# Patient Record
Sex: FEMALE | Race: WHITE | NOT HISPANIC OR LATINO | Employment: OTHER | RURAL
[De-identification: names, ages, dates, MRNs, and addresses within clinical notes are randomized per-mention and may not be internally consistent; named-entity substitution may affect disease eponyms.]

---

## 2021-12-10 ENCOUNTER — HOSPITAL ENCOUNTER (OUTPATIENT)
Dept: RADIOLOGY | Facility: HOSPITAL | Age: 81
Discharge: HOME | End: 2021-12-10
Attending: NURSE PRACTITIONER
Payer: MEDICARE

## 2021-12-10 DIAGNOSIS — R10.84 DIFFUSE ABDOMINAL PAIN: ICD-10-CM

## 2021-12-10 PROCEDURE — 25500020 PHARM REV CODE 255: Performed by: NURSE PRACTITIONER

## 2021-12-10 PROCEDURE — 74177 CT ABD & PELVIS W/CONTRAST: CPT | Mod: TC

## 2021-12-10 RX ADMIN — IOPAMIDOL 65 ML: 755 INJECTION, SOLUTION INTRAVENOUS at 11:12

## 2022-11-01 ENCOUNTER — LAB REQUISITION (OUTPATIENT)
Dept: LAB | Facility: HOSPITAL | Age: 82
End: 2022-11-01
Attending: NURSE PRACTITIONER
Payer: MEDICARE

## 2022-11-01 DIAGNOSIS — I25.10 ATHEROSCLEROTIC HEART DISEASE OF NATIVE CORONARY ARTERY WITHOUT ANGINA PECTORIS: ICD-10-CM

## 2022-11-01 DIAGNOSIS — I48.91 UNSPECIFIED ATRIAL FIBRILLATION: ICD-10-CM

## 2022-11-01 LAB
ANION GAP SERPL CALCULATED.3IONS-SCNC: 11 MMOL/L (ref 7–16)
BUN SERPL-MCNC: 21 MG/DL (ref 7–18)
BUN/CREAT SERPL: 27 (ref 6–20)
CALCIUM SERPL-MCNC: 9.6 MG/DL (ref 8.5–10.1)
CHLORIDE SERPL-SCNC: 101 MMOL/L (ref 98–107)
CO2 SERPL-SCNC: 34 MMOL/L (ref 21–32)
CREAT SERPL-MCNC: 0.78 MG/DL (ref 0.55–1.02)
EGFR (NO RACE VARIABLE) (RUSH/TITUS): 77 ML/MIN/1.73M²
GLUCOSE SERPL-MCNC: 175 MG/DL (ref 74–106)
NT-PROBNP SERPL-MCNC: 3051 PG/ML (ref 1–450)
POTASSIUM SERPL-SCNC: 5.5 MMOL/L (ref 3.5–5.1)
SODIUM SERPL-SCNC: 140 MMOL/L (ref 136–145)

## 2022-11-01 PROCEDURE — 83880 ASSAY OF NATRIURETIC PEPTIDE: CPT | Performed by: NURSE PRACTITIONER

## 2022-11-01 PROCEDURE — 80048 BASIC METABOLIC PNL TOTAL CA: CPT | Performed by: NURSE PRACTITIONER

## 2022-11-07 ENCOUNTER — LAB REQUISITION (OUTPATIENT)
Dept: LAB | Facility: HOSPITAL | Age: 82
End: 2022-11-07
Attending: NURSE PRACTITIONER
Payer: MEDICARE

## 2022-11-07 DIAGNOSIS — I10 ESSENTIAL (PRIMARY) HYPERTENSION: ICD-10-CM

## 2022-11-07 LAB — NT-PROBNP SERPL-MCNC: 3185 PG/ML (ref 1–450)

## 2022-11-07 PROCEDURE — 83880 ASSAY OF NATRIURETIC PEPTIDE: CPT | Performed by: NURSE PRACTITIONER

## 2022-12-28 ENCOUNTER — HOSPITAL ENCOUNTER (EMERGENCY)
Facility: HOSPITAL | Age: 82
Discharge: OTHER INSTITUTION | End: 2022-12-28
Payer: MEDICARE

## 2022-12-28 VITALS
HEIGHT: 68 IN | BODY MASS INDEX: 22.73 KG/M2 | TEMPERATURE: 98 F | DIASTOLIC BLOOD PRESSURE: 87 MMHG | SYSTOLIC BLOOD PRESSURE: 151 MMHG | OXYGEN SATURATION: 96 % | RESPIRATION RATE: 14 BRPM | WEIGHT: 150 LBS | HEART RATE: 75 BPM
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
## 2022-12-28 DIAGNOSIS — S72.141A CLOSED COMMINUTED INTERTROCHANTERIC FRACTURE OF RIGHT FEMUR, INITIAL ENCOUNTER: Primary | ICD-10-CM

## 2022-12-28 LAB
ALBUMIN SERPL BCP-MCNC: 3.9 G/DL (ref 3.5–5)
ALBUMIN/GLOB SERPL: 1.3 {RATIO}
ALP SERPL-CCNC: 73 U/L (ref 55–142)
ALT SERPL W P-5'-P-CCNC: 64 U/L (ref 13–56)
ANION GAP SERPL CALCULATED.3IONS-SCNC: 5 MMOL/L (ref 7–16)
AST SERPL W P-5'-P-CCNC: 38 U/L (ref 15–37)
BASOPHILS # BLD AUTO: 0.06 K/UL (ref 0–0.2)
BASOPHILS NFR BLD AUTO: 0.4 % (ref 0–1)
BILIRUB SERPL-MCNC: 0.4 MG/DL (ref ?–1.2)
BUN SERPL-MCNC: 15 MG/DL (ref 7–18)
BUN/CREAT SERPL: 13 (ref 6–20)
CALCIUM SERPL-MCNC: 9.3 MG/DL (ref 8.5–10.1)
CHLORIDE SERPL-SCNC: 93 MMOL/L (ref 98–107)
CO2 SERPL-SCNC: 34 MMOL/L (ref 21–32)
CREAT SERPL-MCNC: 1.14 MG/DL (ref 0.55–1.02)
DIFFERENTIAL METHOD BLD: ABNORMAL
EGFR (NO RACE VARIABLE) (RUSH/TITUS): 49 ML/MIN/1.73M²
EOSINOPHIL # BLD AUTO: 0.03 K/UL (ref 0–0.5)
EOSINOPHIL NFR BLD AUTO: 0.2 % (ref 1–4)
ERYTHROCYTE [DISTWIDTH] IN BLOOD BY AUTOMATED COUNT: 16.1 % (ref 11.5–14.5)
GLOBULIN SER-MCNC: 3.1 G/DL (ref 2–4)
GLUCOSE SERPL-MCNC: 328 MG/DL (ref 74–106)
HCT VFR BLD AUTO: 45.2 % (ref 38–47)
HGB BLD-MCNC: 13.5 G/DL (ref 12–16)
INR BLD: 1.56
LYMPHOCYTES # BLD AUTO: 2.22 K/UL (ref 1–4.8)
LYMPHOCYTES NFR BLD AUTO: 15.3 % (ref 27–41)
MCH RBC QN AUTO: 28.7 PG (ref 27–31)
MCHC RBC AUTO-ENTMCNC: 29.9 G/DL (ref 32–36)
MCV RBC AUTO: 96.2 FL (ref 80–96)
MONOCYTES # BLD AUTO: 0.82 K/UL (ref 0–0.8)
MONOCYTES NFR BLD AUTO: 5.6 % (ref 2–6)
MPC BLD CALC-MCNC: 11.6 FL (ref 9.4–12.4)
NEUTROPHILS # BLD AUTO: 11.39 K/UL (ref 1.8–7.7)
NEUTROPHILS NFR BLD AUTO: 78.5 % (ref 53–65)
PLATELET # BLD AUTO: 133 K/UL (ref 150–400)
POTASSIUM SERPL-SCNC: 3.7 MMOL/L (ref 3.5–5.1)
PROT SERPL-MCNC: 7 G/DL (ref 6.4–8.2)
PROTHROMBIN TIME: 18.7 SECONDS (ref 11.7–14.7)
RBC # BLD AUTO: 4.7 M/UL (ref 4.2–5.4)
SODIUM SERPL-SCNC: 128 MMOL/L (ref 136–145)
WBC # BLD AUTO: 14.52 K/UL (ref 4.5–11)

## 2022-12-28 PROCEDURE — 85025 COMPLETE CBC W/AUTO DIFF WBC: CPT | Performed by: NURSE PRACTITIONER

## 2022-12-28 PROCEDURE — 96361 HYDRATE IV INFUSION ADD-ON: CPT

## 2022-12-28 PROCEDURE — 63600175 PHARM REV CODE 636 W HCPCS: Performed by: NURSE PRACTITIONER

## 2022-12-28 PROCEDURE — 96374 THER/PROPH/DIAG INJ IV PUSH: CPT

## 2022-12-28 PROCEDURE — 85610 PROTHROMBIN TIME: CPT | Performed by: NURSE PRACTITIONER

## 2022-12-28 PROCEDURE — 96375 TX/PRO/DX INJ NEW DRUG ADDON: CPT

## 2022-12-28 PROCEDURE — 96376 TX/PRO/DX INJ SAME DRUG ADON: CPT

## 2022-12-28 PROCEDURE — 80053 COMPREHEN METABOLIC PANEL: CPT | Performed by: NURSE PRACTITIONER

## 2022-12-28 PROCEDURE — 99283 EMERGENCY DEPT VISIT LOW MDM: CPT | Mod: GF | Performed by: NURSE PRACTITIONER

## 2022-12-28 PROCEDURE — 99285 EMERGENCY DEPT VISIT HI MDM: CPT | Mod: 25

## 2022-12-28 PROCEDURE — 25000003 PHARM REV CODE 250: Performed by: NURSE PRACTITIONER

## 2022-12-28 RX ORDER — SODIUM CHLORIDE 9 MG/ML
1000 INJECTION, SOLUTION INTRAVENOUS
Status: COMPLETED | OUTPATIENT
Start: 2022-12-28 | End: 2022-12-28

## 2022-12-28 RX ORDER — ATORVASTATIN CALCIUM 40 MG/1
40 TABLET, FILM COATED ORAL
COMMUNITY
Start: 2022-09-06

## 2022-12-28 RX ORDER — HYDROMORPHONE HYDROCHLORIDE 2 MG/ML
0.5 INJECTION, SOLUTION INTRAMUSCULAR; INTRAVENOUS; SUBCUTANEOUS
Status: COMPLETED | OUTPATIENT
Start: 2022-12-28 | End: 2022-12-28

## 2022-12-28 RX ORDER — FUROSEMIDE 40 MG/1
40 TABLET ORAL DAILY
COMMUNITY
Start: 2022-11-21

## 2022-12-28 RX ORDER — LEVOTHYROXINE SODIUM 25 UG/1
25 TABLET ORAL EVERY MORNING
Status: ON HOLD | COMMUNITY
Start: 2022-12-12 | End: 2023-01-17 | Stop reason: HOSPADM

## 2022-12-28 RX ORDER — METOPROLOL SUCCINATE 25 MG/1
0.5 TABLET, EXTENDED RELEASE ORAL
Status: ON HOLD | COMMUNITY
Start: 2022-10-04 | End: 2023-01-04

## 2022-12-28 RX ORDER — METFORMIN HYDROCHLORIDE 1000 MG/1
1000 TABLET ORAL 2 TIMES DAILY WITH MEALS
COMMUNITY
Start: 2022-12-15

## 2022-12-28 RX ORDER — WARFARIN 4 MG/1
4 TABLET ORAL DAILY
COMMUNITY
Start: 2022-11-23

## 2022-12-28 RX ORDER — MORPHINE SULFATE 4 MG/ML
2 INJECTION, SOLUTION INTRAMUSCULAR; INTRAVENOUS
Status: COMPLETED | OUTPATIENT
Start: 2022-12-28 | End: 2022-12-28

## 2022-12-28 RX ORDER — PREDNISONE 5 MG/1
5 TABLET ORAL
COMMUNITY
Start: 2022-11-30

## 2022-12-28 RX ORDER — HYDROMORPHONE HYDROCHLORIDE 2 MG/ML
1 INJECTION, SOLUTION INTRAMUSCULAR; INTRAVENOUS; SUBCUTANEOUS
Status: COMPLETED | OUTPATIENT
Start: 2022-12-28 | End: 2022-12-28

## 2022-12-28 RX ORDER — OXYCODONE HYDROCHLORIDE 10 MG/1
10 TABLET ORAL EVERY 4 HOURS PRN
Status: ON HOLD | COMMUNITY
Start: 2022-12-03 | End: 2023-01-17 | Stop reason: HOSPADM

## 2022-12-28 RX ORDER — WARFARIN SODIUM 5 MG/1
TABLET ORAL
COMMUNITY
Start: 2022-11-21 | End: 2023-06-13 | Stop reason: CLARIF

## 2022-12-28 RX ORDER — ISOSORBIDE MONONITRATE 30 MG/1
30 TABLET, EXTENDED RELEASE ORAL EVERY MORNING
Status: ON HOLD | COMMUNITY
Start: 2022-09-13 | End: 2023-01-04

## 2022-12-28 RX ADMIN — HYDROMORPHONE HYDROCHLORIDE 1 MG: 2 INJECTION, SOLUTION INTRAMUSCULAR; INTRAVENOUS; SUBCUTANEOUS at 03:12

## 2022-12-28 RX ADMIN — SODIUM CHLORIDE 1000 ML: 9 INJECTION, SOLUTION INTRAVENOUS at 01:12

## 2022-12-28 RX ADMIN — HYDROMORPHONE HYDROCHLORIDE 0.5 MG: 2 INJECTION, SOLUTION INTRAMUSCULAR; INTRAVENOUS; SUBCUTANEOUS at 01:12

## 2022-12-28 RX ADMIN — MORPHINE SULFATE 2 MG: 4 INJECTION, SOLUTION INTRAMUSCULAR; INTRAVENOUS at 12:12

## 2022-12-28 RX ADMIN — HYDROMORPHONE HYDROCHLORIDE 0.5 MG: 2 INJECTION, SOLUTION INTRAMUSCULAR; INTRAVENOUS; SUBCUTANEOUS at 02:12

## 2022-12-28 NOTE — ED TRIAGE NOTES
Presents to ed with son per pov s/p fall  c/o right hip pain.skin tear noted to right inner forearm,bleeding controlled.took pain pill pta in ed.

## 2022-12-28 NOTE — ED PROVIDER NOTES
Encounter Date: 12/28/2022       History     Chief Complaint   Patient presents with    Fall    Hip Pain     right     82 yr old WF to ED with c/o fell this morning in the laundry room, states did strike head but no LOC.  C/O right hip pain.  Noted skin tear to right forearm.      The history is provided by the patient and a relative.   Fall  The accident occurred just prior to arrival. The fall occurred while walking. She landed on A hard floor. The point of impact was the head and right hip. Pertinent negatives include no neck pain, no back pain, no paresthesias, no paralysis, no numbness, no abdominal pain and no headaches. The symptoms are aggravated by ambulation and use of the injured limb. She has tried nothing for the symptoms.   Review of patient's allergies indicates:   Allergen Reactions    Tylenol [acetaminophen] Anaphylaxis     History reviewed. No pertinent past medical history.  Past Surgical History:   Procedure Laterality Date    APPENDECTOMY      BACK SURGERY  1980 1984    BILATERAL TUBAL LIGATION  1994    CARDIAC SURGERY  1990    aorta bypass    caritod Left     HERNIA REPAIR      intestine repair       History reviewed. No pertinent family history.     Review of Systems   Constitutional: Negative.    Respiratory: Negative.     Cardiovascular: Negative.    Gastrointestinal:  Negative for abdominal pain.   Musculoskeletal:  Negative for back pain and neck pain.   Skin: Negative.    Neurological:  Negative for numbness, headaches and paresthesias.   Psychiatric/Behavioral: Negative.       Physical Exam     Initial Vitals [12/28/22 1102]   BP Pulse Resp Temp SpO2   (!) 120/56 67 16 97.7 °F (36.5 °C) 96 %      MAP       --         Physical Exam    Nursing note and vitals reviewed.  Constitutional: She appears well-developed and well-nourished.   Neck: Neck supple.   Cardiovascular:  Normal rate and regular rhythm.           Pulmonary/Chest: Breath sounds normal.   Abdominal: Abdomen is soft. There  is no abdominal tenderness.   Musculoskeletal:         General: Tenderness present. Normal range of motion.      Cervical back: Neck supple.      Comments: Right hip     Skin: Skin is warm and dry.   Skin tear to right forearm   Psychiatric: She has a normal mood and affect.       Medical Screening Exam   See Full Note    ED Course   Procedures  Labs Reviewed   COMPREHENSIVE METABOLIC PANEL - Abnormal; Notable for the following components:       Result Value    Sodium 128 (*)     Chloride 93 (*)     CO2 34 (*)     Anion Gap 5 (*)     Glucose 328 (*)     Creatinine 1.14 (*)     ALT 64 (*)     AST 38 (*)     eGFR 49 (*)     All other components within normal limits   PROTIME-INR - Abnormal; Notable for the following components:    PT 18.7 (*)     All other components within normal limits   CBC WITH DIFFERENTIAL - Abnormal; Notable for the following components:    WBC 14.52 (*)     MCV 96.2 (*)     MCHC 29.9 (*)     RDW 16.1 (*)     Platelet Count 133 (*)     Neutrophils % 78.5 (*)     Lymphocytes % 15.3 (*)     Neutrophils, Abs 11.39 (*)     Eosinophils % 0.2 (*)     Monocytes, Absolute 0.82 (*)     All other components within normal limits   CBC W/ AUTO DIFFERENTIAL    Narrative:     The following orders were created for panel order CBC Auto Differential.  Procedure                               Abnormality         Status                     ---------                               -----------         ------                     CBC with Differential[[id number]]        Abnormal            Final result               Manual Differential[[id number]]                              Final result                 Please view results for these tests on the individual orders.   MANUAL DIFFERENTIAL          Imaging Results              CT Cervical Spine Without Contrast (Final result)  Result time 12/28/22 11:51:25      Final result by Ehsan Uribe MD (12/28/22 11:51:25)                   Impression:      No acute fracture or  dislocation of the cervical spine.      Electronically signed by: Ehsan Uribe  Date:    12/28/2022  Time:    11:51               Narrative:    EXAMINATION:  CT CERVICAL SPINE WITHOUT CONTRAST    CLINICAL HISTORY:  Neck trauma (Age >= 65y);    TECHNIQUE:  CT of the cervical spine performed without intravenous contrast.  The CT examination was performed using one or more of the following dose reduction techniques: Automated exposure control, adjustment of the mA and kV according to patient's size, use of acute or iterative reconstruction techniques.    COMPARISON:  None    FINDINGS:  No acute fracture identified.  No dislocation.  The craniocervical junction is intact.  Degenerative endplate and facet changes are seen throughout the cervical spine with spinal canal and foraminal narrowing from C2-3 through C6-7.  Vascular calcifications in the neck.                                       CT Head Without Contrast (Final result)  Result time 12/28/22 11:50:22      Final result by Shukri Salas DO (12/28/22 11:50:22)                   Impression:      No acute intracranial findings.      Electronically signed by: Shukri Salas  Date:    12/28/2022  Time:    11:50               Narrative:    EXAMINATION:  CT HEAD WITHOUT CONTRAST    CLINICAL HISTORY:  Head trauma, minor (Age >= 65y);    TECHNIQUE:  Multiplanar CT imaging from the vertex to skull the skull base was performed without contrast.    COMPARISON:  None    FINDINGS:  Gray-white white differentiation is normal.    There is no CT evidence of acute intracranial hemorrhage or large territorial infarct. No epidural/subdural hematomas.    There is no evidence of hydrocephalus, midline shift or mass effect.    Scalp, paranasal sinuses, and mastoid air cells are normal.                                       X-Ray Hip 2 or 3 views Right (with Pelvis when performed) (Final result)  Result time 12/28/22 11:54:18      Final result by Ehsan Uribe MD (12/28/22  11:54:18)                   Impression:      As above      Electronically signed by: Ehsan Uribe  Date:    12/28/2022  Time:    11:54               Narrative:    EXAMINATION:  XR HIP WITH PELVIS WHEN PERFORMED, 2 OR 3  VIEWS RIGHT    CLINICAL HISTORY:  fall;    TECHNIQUE:  AP view of the pelvis and frog leg lateral view of the right hip were performed.    COMPARISON:  None    FINDINGS:  There is an acute comminuted and minimally displaced right intertrochanteric fracture.  No other fracture identified.                                       Medications   HYDROmorphone (PF) injection 1 mg (has no administration in time range)   morphine injection 2 mg (2 mg Intravenous Given 12/28/22 1224)   morphine injection 2 mg (2 mg Intravenous Given 12/28/22 1240)   HYDROmorphone (PF) injection 0.5 mg (0.5 mg Intravenous Given 12/28/22 1318)   HYDROmorphone (PF) injection 0.5 mg (0.5 mg Intravenous Given 12/28/22 1335)   0.9%  NaCl infusion (1,000 mLs Intravenous New Bag 12/28/22 1343)   HYDROmorphone (PF) injection 0.5 mg (0.5 mg Intravenous Given 12/28/22 1421)     Medical Decision Making:   Initial Assessment:   82 yr old WF to ED with c/o right hip pain s/p fall at home PTA.  Pt reports struck head but no LOC>  Pt takes warfarin  Differential Diagnosis:   HIP fracture   Hip contusion  Intracranial bleed    Clinical Tests:   Lab Tests: Ordered  Radiological Study: Ordered  ED Management:  Noted to have right intertrochanteric fx  Pt being transferred to [facility] ED to DR Guerrero           ED Course as of 12/28/22 1506   Wed Dec 28, 2022   1209 Discussed findings with patient and son, who request [facility] [CG]   1310 Still reports pain, will give dilaudid [CG]   1329 [facility] transfer center called and states Dr. Guerrero accepts to [facility] [CG]   1506 Pt reports c/o pain, will repeat Dilaudid.  EMS here for transport [CG]      ED Course User Index  [CG] PREETHI Shea          Clinical Impression:   Final  diagnoses:  [S72.141A] Closed comminuted intertrochanteric fracture of right femur, initial encounter (Primary)  [W19.XXXA] Fall, initial encounter        ED Disposition Condition    Transfer to Another Facility Stable                Frannie Antunez, PREETHI  12/28/22 1335       Frannie Antunez, PREETHI  12/28/22 1417       Frannie Antunez, PREETHI  12/28/22 1418       Frannie Antunez, PADDY  12/28/22 1506

## 2022-12-28 NOTE — ED NOTES
12 cm skin tear noted to right inner arm. Skin tear cleaned with ns then steri strips applied .multiple abrasions noted .

## 2023-01-04 ENCOUNTER — HOSPITAL ENCOUNTER (INPATIENT)
Facility: HOSPITAL | Age: 83
LOS: 13 days | Discharge: HOME | DRG: 560 | End: 2023-01-17
Attending: HOSPITALIST | Admitting: FAMILY MEDICINE
Payer: MEDICARE

## 2023-01-04 DIAGNOSIS — Z96.641 S/P TOTAL RIGHT HIP ARTHROPLASTY: ICD-10-CM

## 2023-01-04 PROBLEM — I10 HTN (HYPERTENSION): Chronic | Status: ACTIVE | Noted: 2023-01-04

## 2023-01-04 PROBLEM — J44.1 COPD EXACERBATION: Chronic | Status: ACTIVE | Noted: 2023-01-04

## 2023-01-04 PROBLEM — Z78.9 PRESENCE OF SURGICAL INCISION: Status: ACTIVE | Noted: 2023-01-04

## 2023-01-04 PROBLEM — E11.9 DIABETES: Chronic | Status: ACTIVE | Noted: 2023-01-04

## 2023-01-04 PROBLEM — I48.91 ATRIAL FIBRILLATION: Chronic | Status: ACTIVE | Noted: 2023-01-04

## 2023-01-04 LAB
ANION GAP SERPL CALCULATED.3IONS-SCNC: 7 MMOL/L (ref 7–16)
BACTERIA #/AREA URNS HPF: ABNORMAL /HPF
BASOPHILS # BLD AUTO: 0.03 K/UL (ref 0–0.2)
BASOPHILS NFR BLD AUTO: 0.4 % (ref 0–1)
BILIRUB UR QL STRIP: NEGATIVE
BUN SERPL-MCNC: 19 MG/DL (ref 7–18)
BUN/CREAT SERPL: 18 (ref 6–20)
CALCIUM SERPL-MCNC: 9 MG/DL (ref 8.5–10.1)
CHLORIDE SERPL-SCNC: 96 MMOL/L (ref 98–107)
CLARITY UR: ABNORMAL
CO2 SERPL-SCNC: 39 MMOL/L (ref 21–32)
COLOR UR: YELLOW
CREAT SERPL-MCNC: 1.08 MG/DL (ref 0.55–1.02)
DIFFERENTIAL METHOD BLD: ABNORMAL
EGFR (NO RACE VARIABLE) (RUSH/TITUS): 52 ML/MIN/1.73M²
EOSINOPHIL # BLD AUTO: 0.08 K/UL (ref 0–0.5)
EOSINOPHIL NFR BLD AUTO: 1 % (ref 1–4)
ERYTHROCYTE [DISTWIDTH] IN BLOOD BY AUTOMATED COUNT: 16.5 % (ref 11.5–14.5)
GLUCOSE SERPL-MCNC: 158 MG/DL (ref 70–105)
GLUCOSE SERPL-MCNC: 235 MG/DL (ref 70–105)
GLUCOSE SERPL-MCNC: 252 MG/DL (ref 74–106)
GLUCOSE UR STRIP-MCNC: NEGATIVE MG/DL
HCT VFR BLD AUTO: 34.5 % (ref 38–47)
HGB BLD-MCNC: 10.5 G/DL (ref 12–16)
INR BLD: 1.45
KETONES UR STRIP-SCNC: NEGATIVE MG/DL
LEUKOCYTE ESTERASE UR QL STRIP: ABNORMAL
LYMPHOCYTES # BLD AUTO: 0.67 K/UL (ref 1–4.8)
LYMPHOCYTES NFR BLD AUTO: 8 % (ref 27–41)
MCH RBC QN AUTO: 28.6 PG (ref 27–31)
MCHC RBC AUTO-ENTMCNC: 30.4 G/DL (ref 32–36)
MCV RBC AUTO: 94 FL (ref 80–96)
MONOCYTES # BLD AUTO: 0.66 K/UL (ref 0–0.8)
MONOCYTES NFR BLD AUTO: 7.9 % (ref 2–6)
MPC BLD CALC-MCNC: 10.3 FL (ref 9.4–12.4)
NEUTROPHILS # BLD AUTO: 6.92 K/UL (ref 1.8–7.7)
NEUTROPHILS NFR BLD AUTO: 82.7 % (ref 53–65)
NITRITE UR QL STRIP: NEGATIVE
PH UR STRIP: 6.5 PH UNITS
PLATELET # BLD AUTO: 271 K/UL (ref 150–400)
POTASSIUM SERPL-SCNC: 4.2 MMOL/L (ref 3.5–5.1)
PROT UR QL STRIP: NEGATIVE
PROTHROMBIN TIME: 17.7 SECONDS (ref 11.7–14.7)
RBC # BLD AUTO: 3.67 M/UL (ref 4.2–5.4)
RBC # UR STRIP: ABNORMAL /UL
RBC #/AREA URNS HPF: ABNORMAL /HPF
SODIUM SERPL-SCNC: 138 MMOL/L (ref 136–145)
SP GR UR STRIP: 1.01
SQUAMOUS #/AREA URNS LPF: ABNORMAL /LPF
UROBILINOGEN UR STRIP-ACNC: 0.2 MG/DL
WBC # BLD AUTO: 8.36 K/UL (ref 4.5–11)
WBC #/AREA URNS HPF: ABNORMAL /HPF

## 2023-01-04 PROCEDURE — 94761 N-INVAS EAR/PLS OXIMETRY MLT: CPT

## 2023-01-04 PROCEDURE — 81001 URINALYSIS AUTO W/SCOPE: CPT | Performed by: NURSE PRACTITIONER

## 2023-01-04 PROCEDURE — 36415 COLL VENOUS BLD VENIPUNCTURE: CPT | Performed by: NURSE PRACTITIONER

## 2023-01-04 PROCEDURE — 97163 PT EVAL HIGH COMPLEX 45 MIN: CPT

## 2023-01-04 PROCEDURE — 87186 SC STD MICRODIL/AGAR DIL: CPT | Performed by: NURSE PRACTITIONER

## 2023-01-04 PROCEDURE — 85610 PROTHROMBIN TIME: CPT | Performed by: NURSE PRACTITIONER

## 2023-01-04 PROCEDURE — 80048 BASIC METABOLIC PNL TOTAL CA: CPT | Performed by: NURSE PRACTITIONER

## 2023-01-04 PROCEDURE — 63600175 PHARM REV CODE 636 W HCPCS: Performed by: NURSE PRACTITIONER

## 2023-01-04 PROCEDURE — 85025 COMPLETE CBC W/AUTO DIFF WBC: CPT | Performed by: NURSE PRACTITIONER

## 2023-01-04 PROCEDURE — 27000221 HC OXYGEN, UP TO 24 HOURS

## 2023-01-04 PROCEDURE — 87077 CULTURE AEROBIC IDENTIFY: CPT | Performed by: NURSE PRACTITIONER

## 2023-01-04 PROCEDURE — 99900031 HC PATIENT EDUCATION (STAT)

## 2023-01-04 PROCEDURE — 82962 GLUCOSE BLOOD TEST: CPT

## 2023-01-04 PROCEDURE — 99900035 HC TECH TIME PER 15 MIN (STAT)

## 2023-01-04 PROCEDURE — 11000004 HC SNF PRIVATE

## 2023-01-04 PROCEDURE — 25000003 PHARM REV CODE 250: Performed by: NURSE PRACTITIONER

## 2023-01-04 PROCEDURE — 99406 BEHAV CHNG SMOKING 3-10 MIN: CPT

## 2023-01-04 PROCEDURE — 27000958

## 2023-01-04 RX ORDER — ENOXAPARIN SODIUM 100 MG/ML
40 INJECTION SUBCUTANEOUS DAILY
Status: DISCONTINUED | OUTPATIENT
Start: 2023-01-05 | End: 2023-01-09

## 2023-01-04 RX ORDER — DIGOXIN 125 MCG
125 TABLET ORAL DAILY
COMMUNITY

## 2023-01-04 RX ORDER — AMIODARONE HYDROCHLORIDE 200 MG/1
200 TABLET ORAL 2 TIMES DAILY
Status: DISCONTINUED | OUTPATIENT
Start: 2023-01-04 | End: 2023-01-17 | Stop reason: HOSPADM

## 2023-01-04 RX ORDER — POLYETHYLENE GLYCOL 3350 17 G/17G
17 POWDER, FOR SOLUTION ORAL DAILY
Status: ON HOLD | COMMUNITY
Start: 2023-01-04 | End: 2023-01-17 | Stop reason: HOSPADM

## 2023-01-04 RX ORDER — GLUCAGON 1 MG
1 KIT INJECTION
Status: DISCONTINUED | OUTPATIENT
Start: 2023-01-04 | End: 2023-01-17 | Stop reason: HOSPADM

## 2023-01-04 RX ORDER — MAG HYDROX/ALUMINUM HYD/SIMETH 200-200-20
30 SUSPENSION, ORAL (FINAL DOSE FORM) ORAL EVERY 6 HOURS PRN
Status: DISCONTINUED | OUTPATIENT
Start: 2023-01-04 | End: 2023-01-17 | Stop reason: HOSPADM

## 2023-01-04 RX ORDER — ADHESIVE BANDAGE
30 BANDAGE TOPICAL DAILY PRN
Status: DISCONTINUED | OUTPATIENT
Start: 2023-01-04 | End: 2023-01-17 | Stop reason: HOSPADM

## 2023-01-04 RX ORDER — INSULIN ASPART 100 [IU]/ML
0-5 INJECTION, SOLUTION INTRAVENOUS; SUBCUTANEOUS
Status: DISCONTINUED | OUTPATIENT
Start: 2023-01-04 | End: 2023-01-17 | Stop reason: HOSPADM

## 2023-01-04 RX ORDER — TALC
6 POWDER (GRAM) TOPICAL NIGHTLY PRN
Status: DISCONTINUED | OUTPATIENT
Start: 2023-01-04 | End: 2023-01-17 | Stop reason: HOSPADM

## 2023-01-04 RX ORDER — IBUPROFEN 200 MG
16 TABLET ORAL
Status: DISCONTINUED | OUTPATIENT
Start: 2023-01-04 | End: 2023-01-17 | Stop reason: HOSPADM

## 2023-01-04 RX ORDER — CEFDINIR 300 MG/1
300 CAPSULE ORAL EVERY 12 HOURS
Status: DISCONTINUED | OUTPATIENT
Start: 2023-01-04 | End: 2023-01-08

## 2023-01-04 RX ORDER — POLYETHYLENE GLYCOL 3350 17 G/17G
17 POWDER, FOR SOLUTION ORAL DAILY
Status: COMPLETED | OUTPATIENT
Start: 2023-01-05 | End: 2023-01-07

## 2023-01-04 RX ORDER — HYDROMORPHONE HYDROCHLORIDE 2 MG/1
2 TABLET ORAL EVERY 4 HOURS PRN
Status: DISCONTINUED | OUTPATIENT
Start: 2023-01-04 | End: 2023-01-05

## 2023-01-04 RX ORDER — PREDNISONE 5 MG/1
5 TABLET ORAL DAILY
Status: DISCONTINUED | OUTPATIENT
Start: 2023-01-05 | End: 2023-01-17 | Stop reason: HOSPADM

## 2023-01-04 RX ORDER — FUROSEMIDE 40 MG/1
40 TABLET ORAL DAILY
Status: DISCONTINUED | OUTPATIENT
Start: 2023-01-05 | End: 2023-01-17 | Stop reason: HOSPADM

## 2023-01-04 RX ORDER — LEVOTHYROXINE SODIUM 25 UG/1
25 TABLET ORAL
Status: DISCONTINUED | OUTPATIENT
Start: 2023-01-05 | End: 2023-01-17

## 2023-01-04 RX ORDER — ATORVASTATIN CALCIUM 40 MG/1
40 TABLET, FILM COATED ORAL NIGHTLY
Status: DISCONTINUED | OUTPATIENT
Start: 2023-01-04 | End: 2023-01-17 | Stop reason: HOSPADM

## 2023-01-04 RX ORDER — DIGOXIN 125 MCG
0.12 TABLET ORAL DAILY
Status: DISCONTINUED | OUTPATIENT
Start: 2023-01-05 | End: 2023-01-17 | Stop reason: HOSPADM

## 2023-01-04 RX ORDER — AMIODARONE HYDROCHLORIDE 200 MG/1
200 TABLET ORAL DAILY
COMMUNITY

## 2023-01-04 RX ORDER — WARFARIN 1 MG/1
4 TABLET ORAL
Status: DISCONTINUED | OUTPATIENT
Start: 2023-01-04 | End: 2023-01-06

## 2023-01-04 RX ORDER — SENNOSIDES 8.6 MG/1
8.6 TABLET ORAL DAILY PRN
Status: DISCONTINUED | OUTPATIENT
Start: 2023-01-04 | End: 2023-01-17 | Stop reason: HOSPADM

## 2023-01-04 RX ORDER — ONDANSETRON 4 MG/1
4 TABLET, ORALLY DISINTEGRATING ORAL EVERY 8 HOURS PRN
Status: DISCONTINUED | OUTPATIENT
Start: 2023-01-04 | End: 2023-01-17 | Stop reason: HOSPADM

## 2023-01-04 RX ORDER — METFORMIN HYDROCHLORIDE 500 MG/1
1000 TABLET ORAL
Status: DISCONTINUED | OUTPATIENT
Start: 2023-01-05 | End: 2023-01-17 | Stop reason: HOSPADM

## 2023-01-04 RX ORDER — IBUPROFEN 200 MG
24 TABLET ORAL
Status: DISCONTINUED | OUTPATIENT
Start: 2023-01-04 | End: 2023-01-17 | Stop reason: HOSPADM

## 2023-01-04 RX ADMIN — AMIODARONE HYDROCHLORIDE 200 MG: 200 TABLET ORAL at 08:01

## 2023-01-04 RX ADMIN — CEFDINIR 300 MG: 300 CAPSULE ORAL at 08:01

## 2023-01-04 RX ADMIN — Medication 6 MG: at 08:01

## 2023-01-04 RX ADMIN — HYDROMORPHONE HYDROCHLORIDE 2 MG: 2 TABLET ORAL at 08:01

## 2023-01-04 RX ADMIN — ATORVASTATIN CALCIUM 40 MG: 40 TABLET, FILM COATED ORAL at 08:01

## 2023-01-04 RX ADMIN — HYDROMORPHONE HYDROCHLORIDE 2 MG: 2 TABLET ORAL at 04:01

## 2023-01-04 RX ADMIN — WARFARIN SODIUM 4 MG: 1 TABLET ORAL at 05:01

## 2023-01-04 RX ADMIN — INSULIN ASPART 2 UNITS: 100 INJECTION, SOLUTION INTRAVENOUS; SUBCUTANEOUS at 05:01

## 2023-01-04 RX ADMIN — SENNOSIDES 8.6 MG: 8.6 TABLET, FILM COATED ORAL at 08:01

## 2023-01-04 NOTE — RESPIRATORY THERAPY
Called to pt room on admission to room for oxygen setup by nursing services. EMS transferred pt to room and pt had cannula on but not connect to oxygen. On my arrival pt o2 sat 61% room air. O2 initiated immediately with improved o2 sat 93%.  Pt not complaining of shortness of breath. Awake alert and oriented. Family at bedside.

## 2023-01-04 NOTE — ASSESSMENT & PLAN NOTE
PT eval and treat  OT eval and treat  ST eval and treat  Resp consult  Incision site care as directed by ortho  Staple removal as directed by ortho  Ortho follow up in 3 weeks     Rotation Flap Text: The defect edges were debeveled with a #15 scalpel blade.  Given the location of the defect, shape of the defect and the proximity to free margins a rotation flap was deemed most appropriate.  Using a sterile surgical marker, an appropriate rotation flap was drawn incorporating the defect and placing the expected incisions within the relaxed skin tension lines where possible.    The area thus outlined was incised deep to adipose tissue with a #15 scalpel blade.  The skin margins were undermined to an appropriate distance in all directions utilizing iris scissors.

## 2023-01-04 NOTE — PLAN OF CARE
01/04/23 1317   Discharge Assessment   Assessment Type Discharge Planning Assessment   Confirmed/corrected address, phone number and insurance Yes   Confirmed Demographics Correct on Facesheet   Source of Information patient;family   Communicated CHANTAL with patient/caregiver Date not available/Unable to determine   Reason For Admission inpatient PT/OT   People in Home child(ruddy), adult   Facility Arrived From: [facility]   Do you expect to return to your current living situation? Yes   Do you have help at home or someone to help you manage your care at home? Yes   Prior to hospitilization cognitive status: Alert/Oriented   Current cognitive status: Alert/Oriented   Walking or Climbing Stairs ambulation difficulty, requires equipment   Home Accessibility wheelchair accessible   Home Layout Able to live on 1st floor   Equipment Currently Used at Home bedside commode;nebulizer;oxygen;raised toilet;respiratory supplies;walker, rolling   Readmission within 30 days? No   Patient currently being followed by outpatient case management? No   Do you currently have service(s) that help you manage your care at home? Yes   Name and Contact number of agency [facility]   Is the pt/caregiver preference to resume services with current agency Yes   Do you take prescription medications? Yes   Do you have prescription coverage? Yes   Do you have any problems affording any of your prescribed medications? No   Is the patient taking medications as prescribed? yes   Who is going to help you get home at discharge? Son, Vinay daughter, Lisa   How do you get to doctors appointments? car, drives self;family or friend will provide   Are you on dialysis? No   Do you take coumadin? Yes   Who monitors your labs? home health   Discharge Plan A Home with family;Home Health   DME Needed Upon Discharge  other (see comments)  (tbd)   Discharge Plan discussed with: Patient   Discharge Barriers Identified None   Physical Activity   On average, how many  days per week do you engage in moderate to strenuous exercise (like a brisk walk)? 0 days   On average, how many minutes do you engage in exercise at this level? 0 min   Financial Resource Strain   How hard is it for you to pay for the very basics like food, housing, medical care, and heating? Not hard   Housing Stability   In the last 12 months, was there a time when you were not able to pay the mortgage or rent on time? N   In the last 12 months, how many places have you lived? 1   In the last 12 months, was there a time when you did not have a steady place to sleep or slept in a shelter (including now)? N   Transportation Needs   In the past 12 months, has lack of transportation kept you from medical appointments or from getting medications? no   In the past 12 months, has lack of transportation kept you from meetings, work, or from getting things needed for daily living? No   Food Insecurity   Within the past 12 months, you worried that your food would run out before you got the money to buy more. Never true   Within the past 12 months, the food you bought just didn't last and you didn't have money to get more. Never true   Stress   Do you feel stress - tense, restless, nervous, or anxious, or unable to sleep at night because your mind is troubled all the time - these days? Only a littl   Social Connections   In a typical week, how many times do you talk on the phone with family, friends, or neighbors? More than 3   How often do you get together with friends or relatives? More than 3   How often do you attend Roman Catholic or Adventism services? More than 4   Do you belong to any clubs or organizations such as Roman Catholic groups, unions, fraternal or athletic groups, or school groups? No   How often do you attend meetings of the clubs or organizations you belong to? Never   Are you , , , , never , or living with a partner?    Alcohol Use   Q1: How often do you have a drink  containing alcohol? Never   Q2: How many drinks containing alcohol do you have on a typical day when you are drinking? None   Q3: How often do you have six or more drinks on one occasion? Never     Ms. Rojas is admitted to [facility] for inpatient pt and ot following a r hip repair. She reports that her daughter lives with her and she is followed by [facility]. Thalia Bowie is her PCP. Uses [facility] in [place] for medications. Will monitor for discharge needs throughout stay.

## 2023-01-04 NOTE — PROGRESS NOTES
Ms Rojas is an 83 y/o female admitted to [facility] for rehabilitation services following right hip fracture secondary to fall requiring Right hip arthroplasty. She initially presented to [facility] ED on 12/28 after trip and fall in home where she was found to have an acute comminuted and minimally displaced right intertrochanteric fracture. She was transferred to [facility] for ortho intervention. She underwent surgical repair of right hip on 12/30. Non-removable surgical dressing to be removed 10 days post op (1/9). Staples to be removed 14 days post op (1/13) and stero strips applied. Prior to fall, she was ambulatory with walker dependence. She is on chronic O2 @ 2L NC with hx of COPD. She will be admitted for PT/OT rehabilitation with hopes to return home when goals are met.   Code Status: FULL CODE  PMH: HTN, Atrial Fibrillation on coumadin, DM, HF, COPD oxygen dependant     Ms Rojas found lying in bed AAOx3 and in NAD. Resp even and unlabored. Cardiac regular rate, irregular rhythm with hx of chronic A fib. Abd soft and non-tender with normoactive bowel sounds throughout. Clean dry and intact dressing over surgical incision of right lateral hip. No edema to BLE. She has no questions or concerns at this time.

## 2023-01-04 NOTE — ASSESSMENT & PLAN NOTE
Rate control with amiodarone and dig.  Stroke ppx with Coumadin.  Coumadin has been resumed post op and INR trending out but not quite therapeutic. (D/c lovenox for DVT ppx when INR 2)

## 2023-01-04 NOTE — HPI
Ms Rojas is an 83 y/o female admitted to [facility] for rehabilitation services following right hip fracture secondary to fall requiring Right hip arthroplasty. She initially presented to [facility] ED on 12/28 after trip and fall in home where she was found to have an acute comminuted and minimally displaced right intertrochanteric fracture. She was transferred to [facility] for ortho intervention. She underwent surgical repair of right hip on 12/30. Non-removable surgical dressing to be removed 10 days post op (1/9). Staples to be removed 14 days post op (1/13) and steri strips applied. Ortho follow up in 3 weeks. Prior to fall, she was ambulatory with walker dependence. She is on chronic O2 @ 2L NC with hx of COPD. She will be admitted for PT/OT rehabilitation with hopes to return home when goals are met.     Code Status: FULL CODE    PMH: HTN, Atrial Fibrillation on coumadin, DM, HF, COPD oxygen dependant.  On 2 liters at home

## 2023-01-04 NOTE — PT/OT/SLP EVAL
Physical Therapy Evaluation    Patient Name:  Karina Rojas   MRN:  [id number]    Recommendations:     Discharge Recommendations: home with home health   Discharge Equipment Recommendations: none   Barriers to discharge: Decreased caregiver support    Assessment:     Karina Rojas is a 82 y.o. female admitted with a medical diagnosis of <principal problem not specified>.  She presents with the following impairments/functional limitations: weakness, impaired endurance, gait instability, impaired balance, decreased lower extremity function, pain, decreased ROM, orthopedic precautions . Pt is s/p right trochanteric nailing POD 5. She is WBAT. She is more limited by chronic back pain (10/10) than hip pain (7-8/10). She is on continuous O2 and will need O2 when transported to PT Dept, per her son who was present throughout eval. Her son reports pt has required continuous O2 since hospn 6 months ago for Afib.    Rehab Prognosis: Fair; patient would benefit from acute skilled PT services to address these deficits and reach maximum level of function.    Recent Surgery: * No surgery found *      SUPERVISORY VISIT with KRISTOPHER Ann PTA to discuss results of eval, POC, precautions, PMHX, PLOF, goals and treatment. Pt to be transported with O2 at 2L. Understanding expressed.  Plan:     During this hospitalization, patient to be seen BID to address the identified rehab impairments via gait training, therapeutic activities, therapeutic exercises and progress toward the following goals:    Plan of Care Expires:  02/03/23    Subjective     Chief Complaint: right hip pain, back pain  Patient/Family Comments/goals: DC home with home health when able.  Pain/Comfort:  Pain Rating 1: 8/10  Location - Side 1: Right  Location - Orientation 1: lower  Location 1: hip  Pain Addressed 1: Pre-medicate for activity  Pain Rating Post-Intervention 1: 8/10  Pain Rating 2: 10/10  Location - Side 2: Bilateral  Location - Orientation 2:  midline  Location 2: back  Pain Addressed 2: Pre-medicate for activity  Pain Rating Post-Intervention 2: 10/10    Patients cultural, spiritual, Uatsdin conflicts given the current situation: no    Living Environment:  Pt lives with her dtr in a one-story home with ramp at entrance.  Prior to admission, patients level of function was Modif indep using rollator for household transfers and amb with O2 at 2L. As stated above, pt has been on continuous O2 past 6 months.  Equipment used at home: bedside commode, shower chair, oxygen, rollator.  DME owned (not currently used): none.  Upon discharge, patient will have assistance from family and home health services..    Objective:     Communicated with nsg, pt and pt's son prior to session.  Patient found HOB elevated with oxygen  upon PT entry to room.    General Precautions: Standard, diabetic, fall  Orthopedic Precautions:RLE weight bearing as tolerated   Braces:    Respiratory Status: Nasal cannula, flow 2 L/min    Exams:  RLE ROM: Deficits: hip flex 80 degrees  RLE Strength: Deficits: grossly 2- to 3+/5  LLE ROM: WFL  LLE Strength: WFL    Functional Mobility:  Bed Mobility:     Rolling Right: minimum assistance  Scooting: dependence and of 2 persons  Supine to Sit: moderate assistance and of 2 persons  Sit to Supine: moderate assistance and of 2 persons  Transfers:     Sit to Stand:  moderate assistance and of 2 persons with rolling walker  Gait: Pt amb'd 2 ft along EOB using RW/O2 with min A x 2. Very short steps with antalgic gait. Pt is WBAT RLE.      AM-PAC 6 CLICK MOBILITY  Total Score:12       Treatment & Education:  Eval only. Educated in POC, frequency and treatment.    Patient left HOB elevated with all lines intact and nursing and pt's son present.    GOALS:   Multidisciplinary Problems       Physical Therapy Goals          Problem: Physical Therapy    Goal Priority Disciplines Outcome Goal Variances Interventions   Physical Therapy Goal     PT, PT/OT  Ongoing, Progressing     Description: STG - 2 weeks  1. Pt will perform sit to/from stand and bed transfer with min A x 1.  2. Pt will transfer supine to/from sit with min A x 1.  3. Pt will amb with RW/O2 25 ft with min A x 1.      LTG - 4 weeks  1. Pt will perform sit to/from stand and bed transfer with SBA  2. Pt will transfer supine to/from sit with SBA.  3. Pt will amb with RW/O2 25 ft with SBA.                           History:     Past Medical History:   Diagnosis Date    COPD (chronic obstructive pulmonary disease)     Diabetes mellitus     DVT (deep venous thrombosis)     High cholesterol     Hypertension     Paroxysmal atrial fibrillation        Past Surgical History:   Procedure Laterality Date    APPENDECTOMY      BACK SURGERY  1980 1984    BILATERAL TUBAL LIGATION  1994    CARDIAC SURGERY  1990    aorta bypass    caritod Left     HERNIA REPAIR      intestine repair         Time Tracking:     PT Received On: 01/04/23  PT Start Time: 1539     PT Stop Time: 1557  PT Total Time (min): 18 min     Billable Minutes: Evaluation 18 min      01/04/2023

## 2023-01-04 NOTE — PLAN OF CARE
Problem: Physical Therapy  Goal: Physical Therapy Goal  Description: STG - 2 weeks  1. Pt will perform sit to/from stand and bed transfer with min A x 1.  2. Pt will transfer supine to/from sit with min A x 1.  3. Pt will amb with RW/O2 25 ft with min A x 1.      LTG - 4 weeks  1. Pt will perform sit to/from stand and bed transfer with SBA  2. Pt will transfer supine to/from sit with SBA.  3. Pt will amb with RW/O2 50 ft with SBA.      Outcome: Ongoing, Progressing

## 2023-01-04 NOTE — CONSULTS
Patient awake alert and oriented. Pt takes Trelegy and other inhaler at home only on as needed basis. She is on home o2 @ 2lpm continuous. Pt has o2 setup in room. No complaints at this time. Status ongoing

## 2023-01-04 NOTE — ASSESSMENT & PLAN NOTE
Dressing to be removed 10 days post-op (1/9), then cleanse daily with hydrogen peroxide, apply Bactroban and cover with 4x4 and secure with paper tape.   Staple removal 14 days post op and apply steri strips   Monitor for signs of infection at site

## 2023-01-05 LAB
GLUCOSE SERPL-MCNC: 149 MG/DL (ref 70–105)
GLUCOSE SERPL-MCNC: 161 MG/DL (ref 70–105)
GLUCOSE SERPL-MCNC: 203 MG/DL (ref 70–105)
GLUCOSE SERPL-MCNC: 308 MG/DL (ref 70–105)
INR BLD: 1.62
PROTHROMBIN TIME: 19.3 SECONDS (ref 11.7–14.7)

## 2023-01-05 PROCEDURE — 63600175 PHARM REV CODE 636 W HCPCS: Performed by: NURSE PRACTITIONER

## 2023-01-05 PROCEDURE — 25000003 PHARM REV CODE 250: Performed by: NURSE PRACTITIONER

## 2023-01-05 PROCEDURE — 27000958

## 2023-01-05 PROCEDURE — 82962 GLUCOSE BLOOD TEST: CPT

## 2023-01-05 PROCEDURE — 36415 COLL VENOUS BLD VENIPUNCTURE: CPT | Performed by: NURSE PRACTITIONER

## 2023-01-05 PROCEDURE — 97167 OT EVAL HIGH COMPLEX 60 MIN: CPT

## 2023-01-05 PROCEDURE — 85610 PROTHROMBIN TIME: CPT | Performed by: NURSE PRACTITIONER

## 2023-01-05 PROCEDURE — 27000221 HC OXYGEN, UP TO 24 HOURS

## 2023-01-05 PROCEDURE — 97530 THERAPEUTIC ACTIVITIES: CPT | Mod: CQ

## 2023-01-05 PROCEDURE — 94761 N-INVAS EAR/PLS OXIMETRY MLT: CPT

## 2023-01-05 PROCEDURE — 11000004 HC SNF PRIVATE

## 2023-01-05 PROCEDURE — 97110 THERAPEUTIC EXERCISES: CPT | Mod: CQ

## 2023-01-05 PROCEDURE — 99900039 HC SLP GENERIC THERAPY SCREENING (STAT)

## 2023-01-05 RX ORDER — ALBUTEROL SULFATE 0.83 MG/ML
2.5 SOLUTION RESPIRATORY (INHALATION) EVERY 4 HOURS PRN
Status: DISCONTINUED | OUTPATIENT
Start: 2023-01-05 | End: 2023-01-17 | Stop reason: HOSPADM

## 2023-01-05 RX ORDER — OXYCODONE HYDROCHLORIDE 10 MG/1
10 TABLET ORAL EVERY 4 HOURS PRN
Status: DISCONTINUED | OUTPATIENT
Start: 2023-01-05 | End: 2023-01-17 | Stop reason: HOSPADM

## 2023-01-05 RX ADMIN — HYDROMORPHONE HYDROCHLORIDE 2 MG: 2 TABLET ORAL at 05:01

## 2023-01-05 RX ADMIN — POLYETHYLENE GLYCOL 3350 17 G: 17 POWDER, FOR SOLUTION ORAL at 08:01

## 2023-01-05 RX ADMIN — ENOXAPARIN SODIUM 40 MG: 100 INJECTION SUBCUTANEOUS at 08:01

## 2023-01-05 RX ADMIN — AMIODARONE HYDROCHLORIDE 200 MG: 200 TABLET ORAL at 08:01

## 2023-01-05 RX ADMIN — SENNOSIDES 8.6 MG: 8.6 TABLET, FILM COATED ORAL at 08:01

## 2023-01-05 RX ADMIN — CEFDINIR 300 MG: 300 CAPSULE ORAL at 08:01

## 2023-01-05 RX ADMIN — INSULIN ASPART 2 UNITS: 100 INJECTION, SOLUTION INTRAVENOUS; SUBCUTANEOUS at 06:01

## 2023-01-05 RX ADMIN — ATORVASTATIN CALCIUM 40 MG: 40 TABLET, FILM COATED ORAL at 08:01

## 2023-01-05 RX ADMIN — DIGOXIN 0.12 MG: 125 TABLET ORAL at 08:01

## 2023-01-05 RX ADMIN — METFORMIN HYDROCHLORIDE 1000 MG: 500 TABLET, FILM COATED ORAL at 08:01

## 2023-01-05 RX ADMIN — MAGNESIUM HYDROXIDE 2400 MG: 1200 LIQUID ORAL at 03:01

## 2023-01-05 RX ADMIN — OXYCODONE HYDROCHLORIDE 10 MG: 10 TABLET ORAL at 03:01

## 2023-01-05 RX ADMIN — PREDNISONE 5 MG: 5 TABLET ORAL at 08:01

## 2023-01-05 RX ADMIN — OXYCODONE HYDROCHLORIDE 10 MG: 10 TABLET ORAL at 11:01

## 2023-01-05 RX ADMIN — Medication 6 MG: at 08:01

## 2023-01-05 RX ADMIN — OXYCODONE HYDROCHLORIDE 10 MG: 10 TABLET ORAL at 08:01

## 2023-01-05 RX ADMIN — INSULIN ASPART 4 UNITS: 100 INJECTION, SOLUTION INTRAVENOUS; SUBCUTANEOUS at 11:01

## 2023-01-05 RX ADMIN — LEVOTHYROXINE SODIUM 25 MCG: 0.03 TABLET ORAL at 05:01

## 2023-01-05 RX ADMIN — FUROSEMIDE 40 MG: 40 TABLET ORAL at 08:01

## 2023-01-05 RX ADMIN — HYDROMORPHONE HYDROCHLORIDE 2 MG: 2 TABLET ORAL at 12:01

## 2023-01-05 NOTE — PLAN OF CARE
Problem: Occupational Therapy  Goal: Occupational Therapy Goal  Description: STG: within 2 weeks  Pt will perform grooming with setup at sinkside3  Pt will bathe with min a at bedside  Pt will perform UE dressing with SVN bedside  Pt will perform LE dressing with min a to SBA with AE as needed  Pt will sit EOB x 15 min with no assistance  Pt will transfer bed/chair/bsc with mod a  Pt will perform standing task x 3 min with cga/sba assistance  Pt will tolerate 45 minutes of tx without fatigue      LT.Restore to max I with self care and mobility.     Outcome: Ongoing, Progressing

## 2023-01-05 NOTE — PT/OT/SLP EVAL
ST Screen    ST screen completed with no skilled ST services warranted at this time. Reconsult upon change in status/need.     Yanni Richard M.S. JERAD-SLP

## 2023-01-05 NOTE — PT/OT/SLP PROGRESS
Physical Therapy Treatment    Patient Name:  Karina Rojas   MRN:  [id number]    Recommendations:     Discharge Recommendations: home with home health  Discharge Equipment Recommendations: none  Barriers to discharge: Decreased caregiver support    Assessment:     Karina Rojas is a 82 y.o. female admitted with a medical diagnosis of <principal problem not specified>.  She presents with the following impairments/functional limitations: weakness, impaired endurance, impaired self care skills, impaired functional mobility, gait instability, impaired balance, decreased lower extremity function, pain, decreased ROM, impaired skin, impaired cardiopulmonary response to activity. Patient required moderate assistance for RLE during hip ADD/ABD with knee extended and SLRs due to weakness. Patient required frequent rest breaks due to reports of back pain and noted fatigue. MHP applied to back for 6 minutes to decrease pain. Patient stated that she had increased back pain after completion of treatment.     Rehab Prognosis: Fair; patient would benefit from acute skilled PT services to address these deficits and reach maximum level of function.    Recent Surgery: * No surgery found *      Plan:     During this hospitalization, patient to be seen BID to address the identified rehab impairments via gait training, therapeutic activities, therapeutic exercises and progress toward the following goals:    Plan of Care Expires:  02/03/23    Subjective     Chief Complaint: Back pain  Patient/Family Comments/goals: DC home with home health when able.  Pain/Comfort:  Pain Rating 1: 6/10  Location - Side 1: Bilateral  Location - Orientation 1: generalized  Location 1: back  Pain Addressed 1: Pre-medicate for activity, Other (see comments) (MHP at the end of treatment.)      Objective:     Communicated with pt prior to session.  Patient found HOB elevated with oxygen upon PT entry to room.     General Precautions: Standard, diabetic,  fall  Orthopedic Precautions: RLE weight bearing as tolerated  Braces: N/A  Respiratory Status: Nasal cannula, flow 2 L/min     Functional Mobility:  Bed Mobility:     Rolling Left:  stand by assistance and contact guard assistance  Rolling Right: stand by assistance and contact guard assistance  Scooting: Pt able to perform with SBA, but only able to move slightly up in bed.  Supine to Sit: moderate assistance  Sit to Supine: stand by assistance      AM-PAC 6 CLICK MOBILITY  Turning over in bed (including adjusting bedclothes, sheets and blankets)?: 3  Sitting down on and standing up from a chair with arms (e.g., wheelchair, bedside commode, etc.): 2  Moving from lying on back to sitting on the side of the bed?: 2  Moving to and from a bed to a chair (including a wheelchair)?: 2  Need to walk in hospital room?: 2  Climbing 3-5 steps with a railing?: 1  Basic Mobility Total Score: 12       Treatment & Education:  Patient performed the following supine therapeutic exercises BLE: SLRs 10x, ankle pumps 20x, hip ABD/ADD 10x2, quad sets 20x, Hip ADD 20x  Patient performed LAQs at EOB 10x BLE    Patient left HOB elevated with call button in reach and BLEs elevated with heels floating .    GOALS:   Multidisciplinary Problems       Physical Therapy Goals          Problem: Physical Therapy    Goal Priority Disciplines Outcome Goal Variances Interventions   Physical Therapy Goal     PT, PT/OT Ongoing, Progressing     Description: STG - 2 weeks  1. Pt will perform sit to/from stand and bed transfer with min A x 1.  2. Pt will transfer supine to/from sit with min A x 1.  3. Pt will amb with RW/O2 25 ft with min A x 1.      LTG - 4 weeks  1. Pt will perform sit to/from stand and bed transfer with SBA  2. Pt will transfer supine to/from sit with SBA.  3. Pt will amb with RW/O2 25 ft with SBA.                           Time Tracking:     PT Received On: 01/05/23  PT Start Time: 1300     PT Stop Time: 1349  PT Total Time (min): 49  min 6 minutes non billable for MHP    Billable Minutes: Therapeutic Activity 15 and Therapeutic Exercise 28    Treatment Type: Treatment  PT/PTA: PTA     PTA Visit Number: 1 01/05/2023

## 2023-01-05 NOTE — NURSING
Pt. States she is in great deal of pain. Pt. States she is unable to feel when she is having bowel movement. Pt placed on bedpan and had bowel movement, alerted nurse when she was finished. Nurse went to take pt off bedpan but pt was actively still having bowel movement.

## 2023-01-05 NOTE — NURSING
Nurses Note -- 4 Eyes      1/4/2023   7:33 PM      Skin assessed during: Admit      [] No Pressure Injuries Present    []Prevention Measures Documented      [x] Yes- Altered Skin Integrity Present or Discovered   [] LDA Added if Not in Epic (Describe Wound)   [x] New Altered Skin Integrity was Present on Admit and Documented in LDA   [x] Wound Image Taken    Wound Care Consulted? No    Attending Nurse:  Teofilo Lizarraga LPN     Second RN/Staff Member:  Lisa Davidson RN

## 2023-01-05 NOTE — NURSING
Nurses Note -- 4 Eyes        1/4/2023   0703 AM       Skin assessed during: Admit        [] No Pressure Injuries Present                 []Prevention Measures Documented        [x] Yes- Altered Skin Integrity Present or Discovered              [] LDA Added if Not in Epic (Describe Wound)              [x] New Altered Skin Integrity was Present on Admit and Documented in LDA              [x] Wound Image Taken     Wound Care Consulted? No     Attending Nurse:  Gaby Galdamez LPN      Second RN/Staff Member:  Teofilo Lizarraga LPN

## 2023-01-05 NOTE — NURSING
Pt. States her rectum is straining from straining while attempting to have bowel movement. Pt. Had bowel movement this morning but states that prior to this she had not had bowel movement. Pt given oxycodone earlier today for pain. Because of this, pt given milk of magnesia for constipation and per pt request.

## 2023-01-05 NOTE — PLAN OF CARE
Problem: Adult Inpatient Plan of Care  Goal: Plan of Care Review  Outcome: Ongoing, Progressing  Goal: Patient-Specific Goal (Individualized)  Outcome: Ongoing, Progressing  Goal: Absence of Hospital-Acquired Illness or Injury  Outcome: Ongoing, Progressing  Goal: Optimal Comfort and Wellbeing  Outcome: Ongoing, Progressing  Goal: Readiness for Transition of Care  Outcome: Ongoing, Progressing     Problem: Gas Exchange Impaired  Goal: Optimal Gas Exchange  Outcome: Ongoing, Progressing     Problem: Fall Injury Risk  Goal: Absence of Fall and Fall-Related Injury  Outcome: Ongoing, Progressing

## 2023-01-05 NOTE — PT/OT/SLP EVAL
Occupational Therapy   Evaluation    Name: Karina Rojas  MRN: [id number]  Admitting Diagnosis: <principal problem not specified>  Recent Surgery: * No surgery found *      Recommendations:     Discharge Recommendations:    Discharge Equipment Recommendations:  hip kit  Barriers to discharge:  None    Assessment:     Karina Rojas is a 82 y.o. female with a medical diagnosis of s/p SHYANNE.  She presents with severe pain in back/lumbar spine as well as R hip. Performance deficits affecting function: weakness, impaired endurance, impaired self care skills, impaired functional mobility, gait instability, impaired balance, decreased lower extremity function, pain, decreased ROM, impaired skin, impaired cardiopulmonary response to activity, orthopedic precautions.      Rehab Prognosis: Good; patient would benefit from acute skilled OT services to address these deficits and reach maximum level of function.       Plan:     Patient to be seen 5 x/week to address the above listed problems via self-care/home management, therapeutic activities, therapeutic exercises, wheelchair management/training  Plan of Care Expires: 02/10/23  Plan of Care Reviewed with: patient, caregiver, daughter    Subjective     Chief Complaint: pain in back and right hip  Patient/Family Comments/goals: get her back home as soon as possible, able to do for herself again    Occupational Profile:  Living Environment: home with her family; her daughter reports there are no steps to get in the house, rather a short ramp is available for getting into home  Previous level of function: was driving self occasionally, but daughter reports that was coming to a stop.  Mostly mod I at home with rollator, getting around pretty good before fall  Roles and Routines: lived with family, has assist as needed  Equipment Used at Home: 3-in-1 commode, shower chair, oxygen, rollator  Assistance upon Discharge: sba to mod I with rolling walker or rollator  expected    Pain/Comfort:  Pain Rating 1: 9/10  Location - Side 1: Bilateral  Location - Orientation 1: lower  Location 1: lumbar spine  Pain Addressed 1: Reposition, Distraction, Cessation of Activity, Nurse notified  Pain Rating Post-Intervention 1: 7/10 (bed pan aggravating pan; pt transferred to BSC for more proper positioning)    Patients cultural, spiritual, Shinto conflicts given the current situation: no    Objective:     Communicated with: pt, her daughter, nursing prior to session.  Patient found HOB elevated on bedpan in severe pain with oxygen (pt uses oxygen at home constantly, unable to even be transported in hallway for 1 to 2 minutes without supp ox.) upon OT entry to room.    General Precautions: Standard,    Orthopedic Precautions:    Braces: N/A  Respiratory Status: Nasal cannula, flow 2 L/min    Occupational Performance:    Bed Mobility:    Patient completed Rolling/Turning to Right with moderate assistance  Patient completed Scooting/Bridging with moderate assistance  Patient completed Supine to Sit with moderate assistance    Functional Mobility/Transfers:  Patient completed Sit <> Stand Transfer with minimum assistance and moderate assistance  with  hand-held assist   Patient completed Bed <> Chair Transfer using Step Transfer technique with minimum assistance and moderate assistance with hand-held assist and extra time due to painful hip and back  Functional Mobility: see PT    Activities of Daily Living:  Feeding:  stand by assistance setup  Grooming: minimum assistance setup at bedside  Bathing: moderate assistance and maximal assistance for thoroughness and due to pain mostly  Upper Body Dressing: minimum assistance sitting up Bedside EOB  Lower Body Dressing: maximal assistance needs hip kit  Toileting: moderate assistance to get to BSC, should progress quickly    Cognitive/Visual Perceptual:  Cognitive/Psychosocial Skills:     -       Oriented to: Person and Situation   -        Follows Commands/attention:Follows two-step commands  -       Communication: clear/fluent  -       Memory: No Deficits noted  -       Safety awareness/insight to disability: no safety issues noted today   -       Mood/Affect/Coping skills/emotional control: Appropriate to situation, Cooperative, and in pain, so grimacing with need to transfer to BSC    Physical Exam:  Balance:    -       sitting up EOB=CGA, static standing=mod a/min aoverall, dynamic standing=mod a  Dominant hand:    -       right  Upper Extremity Range of Motion:     -       Right Upper Extremity: WFL  -       Left Upper Extremity: WFL  Upper Extremity Strength:    -       Right Upper Extremity: Deficits: 3/5  -       Left Upper Extremity: Deficits: 3/5   Strength:    -       Right Upper Extremity: Deficits: 3+/5  -       Left Upper Extremity: Deficits: 3+/5  Fine Motor Coordination:    -       Intact  Gross motor coordination:   WFL    AMPAC 6 Click ADL:  AMPAC Total Score: 17    Treatment & Education:  Evaluation completed today    Patient left  up on BSC with nursing present  with call button in reach    GOALS:   Multidisciplinary Problems       Occupational Therapy Goals          Problem: Occupational Therapy    Goal Priority Disciplines Outcome Interventions   Occupational Therapy Goal     OT, PT/OT Ongoing, Progressing    Description: STG: within 2 weeks  Pt will perform grooming with setup at sinkside3  Pt will bathe with min a at bedside  Pt will perform UE dressing with SVN bedside  Pt will perform LE dressing with min a to SBA with AE as needed  Pt will sit EOB x 15 min with no assistance  Pt will transfer bed/chair/bsc with mod a  Pt will perform standing task x 3 min with cga/sba assistance  Pt will tolerate 45 minutes of tx without fatigue      LT.Restore to max I with self care and mobility.                          History:     Past Medical History:   Diagnosis Date    COPD (chronic obstructive pulmonary disease)      Diabetes mellitus     DVT (deep venous thrombosis)     High cholesterol     Hypertension     Paroxysmal atrial fibrillation          Past Surgical History:   Procedure Laterality Date    APPENDECTOMY      BACK SURGERY  1980 1984    BILATERAL TUBAL LIGATION  1994    CARDIAC SURGERY  1990    aorta bypass    caritod Left     HERNIA REPAIR      intestine repair         Time Tracking:     OT Date of Treatment: 01/05/23  OT Start Time: 0933  OT Stop Time: 0950  OT Total Time (min): 17 min    Billable Minutes:Evaluation 17    1/5/2023

## 2023-01-06 PROBLEM — J44.1 COPD EXACERBATION: Status: ACTIVE | Noted: 2023-01-04

## 2023-01-06 PROBLEM — I48.91 ATRIAL FIBRILLATION: Status: ACTIVE | Noted: 2023-01-04

## 2023-01-06 PROBLEM — I10 HTN (HYPERTENSION): Status: ACTIVE | Noted: 2023-01-04

## 2023-01-06 PROBLEM — N30.00 ACUTE CYSTITIS WITHOUT HEMATURIA: Status: ACTIVE | Noted: 2023-01-06

## 2023-01-06 PROBLEM — E11.9 DIABETES: Status: ACTIVE | Noted: 2023-01-04

## 2023-01-06 LAB
GLUCOSE SERPL-MCNC: 112 MG/DL (ref 70–105)
GLUCOSE SERPL-MCNC: 140 MG/DL (ref 70–105)
GLUCOSE SERPL-MCNC: 268 MG/DL (ref 70–105)
GLUCOSE SERPL-MCNC: 307 MG/DL (ref 70–105)
INR BLD: 1.71
PROTHROMBIN TIME: 20.1 SECONDS (ref 11.7–14.7)

## 2023-01-06 PROCEDURE — 82962 GLUCOSE BLOOD TEST: CPT

## 2023-01-06 PROCEDURE — 99900035 HC TECH TIME PER 15 MIN (STAT)

## 2023-01-06 PROCEDURE — 99305 PR NURSING FACILITY CARE, INIT, MOD SEVERITY: ICD-10-PCS | Mod: AI,,, | Performed by: FAMILY MEDICINE

## 2023-01-06 PROCEDURE — 27000958

## 2023-01-06 PROCEDURE — 97110 THERAPEUTIC EXERCISES: CPT | Mod: CQ

## 2023-01-06 PROCEDURE — 85610 PROTHROMBIN TIME: CPT | Performed by: NURSE PRACTITIONER

## 2023-01-06 PROCEDURE — 25000003 PHARM REV CODE 250: Performed by: NURSE PRACTITIONER

## 2023-01-06 PROCEDURE — 99305 1ST NF CARE MODERATE MDM 35: CPT | Mod: AI,,, | Performed by: FAMILY MEDICINE

## 2023-01-06 PROCEDURE — 27000221 HC OXYGEN, UP TO 24 HOURS

## 2023-01-06 PROCEDURE — 63600175 PHARM REV CODE 636 W HCPCS: Performed by: NURSE PRACTITIONER

## 2023-01-06 PROCEDURE — 94761 N-INVAS EAR/PLS OXIMETRY MLT: CPT

## 2023-01-06 PROCEDURE — 97535 SELF CARE MNGMENT TRAINING: CPT

## 2023-01-06 PROCEDURE — 36415 COLL VENOUS BLD VENIPUNCTURE: CPT | Performed by: NURSE PRACTITIONER

## 2023-01-06 PROCEDURE — 11000004 HC SNF PRIVATE

## 2023-01-06 RX ORDER — MORPHINE SULFATE 4 MG/ML
2 INJECTION, SOLUTION INTRAMUSCULAR; INTRAVENOUS ONCE
Status: COMPLETED | OUTPATIENT
Start: 2023-01-06 | End: 2023-01-06

## 2023-01-06 RX ORDER — WARFARIN SODIUM 5 MG/1
5 TABLET ORAL
Status: DISCONTINUED | OUTPATIENT
Start: 2023-01-06 | End: 2023-01-11

## 2023-01-06 RX ADMIN — ENOXAPARIN SODIUM 40 MG: 100 INJECTION SUBCUTANEOUS at 08:01

## 2023-01-06 RX ADMIN — PREDNISONE 5 MG: 5 TABLET ORAL at 08:01

## 2023-01-06 RX ADMIN — ATORVASTATIN CALCIUM 40 MG: 40 TABLET, FILM COATED ORAL at 09:01

## 2023-01-06 RX ADMIN — OXYCODONE HYDROCHLORIDE 10 MG: 10 TABLET ORAL at 07:01

## 2023-01-06 RX ADMIN — OXYCODONE HYDROCHLORIDE 10 MG: 10 TABLET ORAL at 08:01

## 2023-01-06 RX ADMIN — CEFDINIR 300 MG: 300 CAPSULE ORAL at 08:01

## 2023-01-06 RX ADMIN — POLYETHYLENE GLYCOL 3350 17 G: 17 POWDER, FOR SOLUTION ORAL at 08:01

## 2023-01-06 RX ADMIN — MORPHINE SULFATE 2 MG: 4 INJECTION INTRAVENOUS at 12:01

## 2023-01-06 RX ADMIN — WARFARIN SODIUM 5 MG: 5 TABLET ORAL at 05:01

## 2023-01-06 RX ADMIN — METFORMIN HYDROCHLORIDE 1000 MG: 500 TABLET, FILM COATED ORAL at 08:01

## 2023-01-06 RX ADMIN — CEFDINIR 300 MG: 300 CAPSULE ORAL at 09:01

## 2023-01-06 RX ADMIN — AMIODARONE HYDROCHLORIDE 200 MG: 200 TABLET ORAL at 08:01

## 2023-01-06 RX ADMIN — DIGOXIN 0.12 MG: 125 TABLET ORAL at 08:01

## 2023-01-06 RX ADMIN — LEVOTHYROXINE SODIUM 25 MCG: 0.03 TABLET ORAL at 05:01

## 2023-01-06 RX ADMIN — INSULIN ASPART 4 UNITS: 100 INJECTION, SOLUTION INTRAVENOUS; SUBCUTANEOUS at 11:01

## 2023-01-06 RX ADMIN — INSULIN ASPART 1 UNITS: 100 INJECTION, SOLUTION INTRAVENOUS; SUBCUTANEOUS at 09:01

## 2023-01-06 RX ADMIN — FUROSEMIDE 40 MG: 40 TABLET ORAL at 08:01

## 2023-01-06 RX ADMIN — AMIODARONE HYDROCHLORIDE 200 MG: 200 TABLET ORAL at 09:01

## 2023-01-06 RX ADMIN — OXYCODONE HYDROCHLORIDE 10 MG: 10 TABLET ORAL at 01:01

## 2023-01-06 NOTE — NURSING
Nurses Note -- 4 Eyes        1/6/2023   0703        Skin assessed during: Q Shift Change        [] No Pressure Injuries Present                 [x]Prevention Measures Documented        [] Yes- Altered Skin Integrity Present or Discovered              [] LDA Added if Not in Epic (Describe Wound)              [x] New Altered Skin Integrity was Present on Admit and Documented in LDA              [] Wound Image Taken     Wound Care Consulted? No     Attending Nurse:  Gaby Galdamez LPN     Second RN/Staff Member:    Teofilo Lizarraga LPN

## 2023-01-06 NOTE — PT/OT/SLP PROGRESS
"Occupational Therapy   Treatment    Name: Karina Rojas  MRN: [id number]  Admitting Diagnosis:  S/p total right hip arthroplasty       Recommendations:     Discharge Recommendations:    Discharge Equipment Recommendations:  hip kit  Barriers to discharge:  None    Assessment:     Karina Rojas is a 82 y.o. female with a medical diagnosis of S/p total right hip arthroplasty.  She presents with much more relaxed pleasant demeanor today, stating, "I was impacted, and they got that situated last night, so I feel a lot better.".Performance deficits affecting function are weakness, impaired endurance, impaired self care skills, impaired functional mobility, gait instability, impaired balance, decreased lower extremity function, pain, decreased ROM, impaired skin, impaired cardiopulmonary response to activity, orthopedic precautions.     Rehab Prognosis:  Good; patient would benefit from acute skilled OT services to address these deficits and reach maximum level of function.       Plan:     Patient to be seen 5 x/week to address the above listed problems via self-care/home management, therapeutic activities, therapeutic exercises, wheelchair management/training  Plan of Care Expires: 02/10/23  Plan of Care Reviewed with: patient, caregiver, daughter    Subjective     Pain/Comfort:       Objective:     Communicated with: pt prior to session.  Patient found HOB elevated with oxygen (pt uses oxygen at home constantly, unable to even be transported in hallway for 1 to 2 minutes without supp ox.) upon OT entry to room.    General Precautions: Standard,      Orthopedic Precautions:   Braces: N/A  Respiratory Status: Nasal cannula, flow 2 L/min     Occupational Performance:     Bed Mobility:    Patient completed Rolling/Turning to Right with moderate assistance  Patient completed Scooting/Bridging with moderate assistance  Patient completed Supine to Sit with moderate assistance     Functional Mobility/Transfers:  Patient " completed Sit <> Stand Transfer with contact guard assistance and minimum assistance  with  rolling walker   Patient completed Bed <> Chair Transfer using Step Transfer technique with contact guard assistance and minimum assistance with rolling walker  Functional Mobility: none    Activities of Daily Living:  Grooming: supervision sinkside  Bathing: minimum assistance bedside, used RW for sit to stand during transitioning  Upper Body Dressing: supervision setup only  Lower Body Dressing: moderate assistance attempted use of sock aid, but somehow pt ended up with slight skin tear on back side of heel; so after bleeding cleaned up by CNA and bandaid placed, OT advised pt we would wait to use sock aid until her heel sores had cleared up.  It did not appear to be from heel sores, but rather higher up in achilles tendon area.  Bleeding was stopped, however, nursing aware.      [facility] 6 Click ADL:      Treatment & Education:  Self care session as noted above; pt was able to finish oral hygiene with setup only at sinkside    Patient left up in chair with call button in reach and CNA present    GOALS:   Multidisciplinary Problems       Occupational Therapy Goals          Problem: Occupational Therapy    Goal Priority Disciplines Outcome Interventions   Occupational Therapy Goal     OT, PT/OT Ongoing, Progressing    Description: STG: within 2 weeks  Pt will perform grooming with setup at sinkside3  Pt will bathe with min a at bedside  Pt will perform UE dressing with SVN bedside  Pt will perform LE dressing with min a to SBA with AE as needed  Pt will sit EOB x 15 min with no assistance  Pt will transfer bed/chair/bsc with mod a  Pt will perform standing task x 3 min with cga/sba assistance  Pt will tolerate 45 minutes of tx without fatigue      LT.Restore to max I with self care and mobility.                          Time Tracking:     OT Date of Treatment: 23  OT Start Time: 910  OT Stop Time: 945  OT Total  Time (min): 35 min    Billable Minutes:Self Care/Home Management 35    OT/HELENA: OT          1/6/2023

## 2023-01-06 NOTE — PT/OT/SLP PROGRESS
Physical Therapy Treatment    Patient Name:  Karina Rojas   MRN:  [id number]    Recommendations:     Discharge Recommendations: home with home health  Discharge Equipment Recommendations: none  Barriers to discharge: Decreased caregiver support    Assessment:     Karina Rojas is a 82 y.o. female admitted with a medical diagnosis of <principal problem not specified>.  She presents with the following impairments/functional limitations: weakness, impaired endurance, impaired functional mobility, impaired self care skills, gait instability, impaired balance, decreased lower extremity function, pain, decreased ROM, impaired skin, impaired cardiopulmonary response to activity. Patient required visual and verbal cues for proper form of therapeutic exercises. Patient able to stand with moderate assistance of one person this date. Patient declined MHP application this date. After completion of treatment patient stated that she had increased back pain, nurse notified.     Rehab Prognosis: Fair; patient would benefit from acute skilled PT services to address these deficits and reach maximum level of function.    Recent Surgery: * No surgery found *      Plan:     During this hospitalization, patient to be seen BID to address the identified rehab impairments via gait training, therapeutic activities, therapeutic exercises and progress toward the following goals:    Plan of Care Expires:  02/03/23    Subjective     Chief Complaint: Back pain  Patient/Family Comments/goals: DC home with home health when able.  Pain/Comfort:  Pain Rating 1: 7/10  Location - Side 1: Bilateral  Location - Orientation 1: generalized  Location 1: back  Pain Addressed 1: Pre-medicate for activity      Objective:     Communicated with pt prior to session. Patient found up in chair with oxygen upon PTA entry to room.     General Precautions: Standard, diabetic, fall  Orthopedic Precautions: RLE weight bearing as tolerated  Braces: N/A  Respiratory  Status: Nasal cannula, flow 2 L/min     Functional Mobility:  Transfers:     Sit to Stand:  moderate assistance and of 1 persons with rolling walker  Balance: Patient performed static standing balance with RW and CGA/Dong for approximately 30-45 seconds 2x      AM-PAC 6 CLICK MOBILITY  Turning over in bed (including adjusting bedclothes, sheets and blankets)?: 3  Sitting down on and standing up from a chair with arms (e.g., wheelchair, bedside commode, etc.): 2  Moving from lying on back to sitting on the side of the bed?: 2  Moving to and from a bed to a chair (including a wheelchair)?: 2  Need to walk in hospital room?: 2  Climbing 3-5 steps with a railing?: 1  Basic Mobility Total Score: 12       Treatment & Education:  Patient performed the following seated therapeutic exercises BLE 10x: LAQS, marching, hip ADD, glute sets, heel raises, toe raises (only on RLE due to pt stating that she had a blister on her L heel)  Sit to stand 2x    Patient left up in chair with call button in reach and son present.    GOALS:   Multidisciplinary Problems       Physical Therapy Goals          Problem: Physical Therapy    Goal Priority Disciplines Outcome Goal Variances Interventions   Physical Therapy Goal     PT, PT/OT Ongoing, Progressing     Description: STG - 2 weeks  1. Pt will perform sit to/from stand and bed transfer with min A x 1.  2. Pt will transfer supine to/from sit with min A x 1.  3. Pt will amb with RW/O2 25 ft with min A x 1.      LTG - 4 weeks  1. Pt will perform sit to/from stand and bed transfer with SBA  2. Pt will transfer supine to/from sit with SBA.  3. Pt will amb with RW/O2 25 ft with SBA.                           Time Tracking:     PT Received On: 01/06/23  PT Start Time: 1038     PT Stop Time: 1059  PT Total Time (min): 21 min     Billable Minutes: Therapeutic Activity 5 and Therapeutic Exercise 16    Treatment Type: Treatment  PT/PTA: PTA     PTA Visit Number: 2     01/06/2023

## 2023-01-06 NOTE — NURSING
Nurses Note -- 4 Eyes      1/5/2023   7:26 PM      Skin assessed during: Q Shift Change      [] No Pressure Injuries Present    [x]Prevention Measures Documented      [] Yes- Altered Skin Integrity Present or Discovered   [] LDA Added if Not in Epic (Describe Wound)   [x] New Altered Skin Integrity was Present on Admit and Documented in LDA   [] Wound Image Taken    Wound Care Consulted? No    Attending Nurse:  Teofilo Lizarraga LPN     Second RN/Staff Member:  Nikolas Negron RN

## 2023-01-06 NOTE — PLAN OF CARE
Problem: Gas Exchange Impaired  Goal: Optimal Gas Exchange  1/6/2023 0743 by Csasi Murguia, RRT  Outcome: Ongoing, Progressing  1/6/2023 0743 by Cassi Murguia, RRT  Outcome: Ongoing, Progressing

## 2023-01-06 NOTE — PLAN OF CARE
Problem: Adult Inpatient Plan of Care  Goal: Plan of Care Review  Outcome: Ongoing, Progressing  Goal: Patient-Specific Goal (Individualized)  Outcome: Ongoing, Progressing  Goal: Optimal Comfort and Wellbeing  Outcome: Ongoing, Progressing  Goal: Readiness for Transition of Care  Outcome: Ongoing, Progressing     Problem: Gas Exchange Impaired  Goal: Optimal Gas Exchange  Outcome: Ongoing, Progressing     Problem: Fall Injury Risk  Goal: Absence of Fall and Fall-Related Injury  Outcome: Ongoing, Progressing     Problem: Skin Injury Risk Increased  Goal: Skin Health and Integrity  Outcome: Ongoing, Progressing     Problem: Impaired Wound Healing  Goal: Optimal Wound Healing  Outcome: Ongoing, Progressing     Problem: Diabetes Comorbidity  Goal: Blood Glucose Level Within Targeted Range  Outcome: Ongoing, Progressing

## 2023-01-06 NOTE — NURSING
Enema performed on pt as instructed by NP. Approx 700cc of soapsud enema was administered via rectum. Pt. Passed large amount of stool and water from enema. Upon evacuation of specimines, pt states she has experienced significant relief from pain and pressure in abdomen. Pt. Cleaned extensively from all fecal matter, bed linens changed. Pt. Sacral wound cleaned with soap and water and re-dressed with foam padding after drying.

## 2023-01-06 NOTE — PT/OT/SLP PROGRESS
Physical Therapy Treatment    Patient Name:  Karina Rojas   MRN:  [id number]    Recommendations:     Discharge Recommendations: home with home health  Discharge Equipment Recommendations: none  Barriers to discharge: Decreased caregiver support    Assessment:     Karina Rojas is a 82 y.o. female admitted with a medical diagnosis of <principal problem not specified>.  She presents with the following impairments/functional limitations: weakness, impaired endurance, impaired functional mobility, impaired self care skills, gait instability, impaired balance, decreased lower extremity function, pain, decreased ROM, impaired skin, impaired cardiopulmonary response to activity. Patient required assistance for some RLE exercises as described below due to weakness. Occasional rest breaks required due to noted fatigue. Patient with no reports of increased pain throughout treatment session.     Rehab Prognosis: Fair; patient would benefit from acute skilled PT services to address these deficits and reach maximum level of function.    Recent Surgery: * No surgery found *      Plan:     During this hospitalization, patient to be seen BID to address the identified rehab impairments via gait training, therapeutic activities, therapeutic exercises and progress toward the following goals:    Plan of Care Expires:  02/03/23    Subjective     Chief Complaint: Back pain  Patient/Family Comments/goals: DC home with home health when able.  Pain/Comfort:  Pain Rating 1: 5/10  Location - Side 1: Bilateral  Location - Orientation 1: generalized  Location 1: back  Pain Addressed 1: Pre-medicate for activity      Objective:     Communicated with pt prior to session. Patient found HOB elevated with oxygen upon PTA entry to room.     General Precautions: Standard, diabetic, fall  Orthopedic Precautions: RLE weight bearing as tolerated  Braces: N/A  Respiratory Status: Nasal cannula, flow 2 L/min     Functional Mobility:  Bed Mobility:    Not performed this treatment.       AM-PAC 6 CLICK MOBILITY  Turning over in bed (including adjusting bedclothes, sheets and blankets)?: 3  Sitting down on and standing up from a chair with arms (e.g., wheelchair, bedside commode, etc.): 2  Moving from lying on back to sitting on the side of the bed?: 2  Moving to and from a bed to a chair (including a wheelchair)?: 2  Need to walk in hospital room?: 2  Climbing 3-5 steps with a railing?: 1  Basic Mobility Total Score: 12       Treatment & Education:  Patient performed the following therapeutic exercises in bed BLE: SLRs 5 x 2 with assistance for RLE, Hip ABD/ADD 10 x 2 with assistance for RLE, SAQs 2 x 10, glute sets 20x, ankle pumps 20x, hip ADD 20x     Patient left HOB elevated with BLEs elevated with heels floating with call button in reach.    GOALS:   Multidisciplinary Problems       Physical Therapy Goals          Problem: Physical Therapy    Goal Priority Disciplines Outcome Goal Variances Interventions   Physical Therapy Goal     PT, PT/OT Ongoing, Progressing     Description: STG - 2 weeks  1. Pt will perform sit to/from stand and bed transfer with min A x 1.  2. Pt will transfer supine to/from sit with min A x 1.  3. Pt will amb with RW/O2 25 ft with min A x 1.      LTG - 4 weeks  1. Pt will perform sit to/from stand and bed transfer with SBA  2. Pt will transfer supine to/from sit with SBA.  3. Pt will amb with RW/O2 25 ft with SBA.                           Time Tracking:     PT Received On: 01/06/23  PT Start Time: 1514     PT Stop Time: 1533  PT Total Time (min): 19 min     Billable Minutes: Therapeutic Exercise 19    Treatment Type: Treatment  PT/PTA: PTA     PTA Visit Number: 3     01/06/2023

## 2023-01-07 LAB
GLUCOSE SERPL-MCNC: 164 MG/DL (ref 70–105)
GLUCOSE SERPL-MCNC: 222 MG/DL (ref 70–105)
GLUCOSE SERPL-MCNC: 240 MG/DL (ref 70–105)
GLUCOSE SERPL-MCNC: 281 MG/DL (ref 70–105)
INR BLD: 2.12
PROTHROMBIN TIME: 23.7 SECONDS (ref 11.7–14.7)

## 2023-01-07 PROCEDURE — 63600175 PHARM REV CODE 636 W HCPCS: Performed by: NURSE PRACTITIONER

## 2023-01-07 PROCEDURE — 36415 COLL VENOUS BLD VENIPUNCTURE: CPT | Performed by: NURSE PRACTITIONER

## 2023-01-07 PROCEDURE — 11000004 HC SNF PRIVATE

## 2023-01-07 PROCEDURE — 85610 PROTHROMBIN TIME: CPT | Performed by: NURSE PRACTITIONER

## 2023-01-07 PROCEDURE — 25000003 PHARM REV CODE 250: Performed by: NURSE PRACTITIONER

## 2023-01-07 PROCEDURE — 82962 GLUCOSE BLOOD TEST: CPT

## 2023-01-07 PROCEDURE — 27000221 HC OXYGEN, UP TO 24 HOURS

## 2023-01-07 PROCEDURE — 94761 N-INVAS EAR/PLS OXIMETRY MLT: CPT

## 2023-01-07 RX ADMIN — OXYCODONE HYDROCHLORIDE 10 MG: 10 TABLET ORAL at 08:01

## 2023-01-07 RX ADMIN — LEVOTHYROXINE SODIUM 25 MCG: 0.03 TABLET ORAL at 06:01

## 2023-01-07 RX ADMIN — SENNOSIDES 8.6 MG: 8.6 TABLET, FILM COATED ORAL at 09:01

## 2023-01-07 RX ADMIN — INSULIN ASPART 1 UNITS: 100 INJECTION, SOLUTION INTRAVENOUS; SUBCUTANEOUS at 08:01

## 2023-01-07 RX ADMIN — INSULIN ASPART 2 UNITS: 100 INJECTION, SOLUTION INTRAVENOUS; SUBCUTANEOUS at 07:01

## 2023-01-07 RX ADMIN — DIGOXIN 0.12 MG: 125 TABLET ORAL at 08:01

## 2023-01-07 RX ADMIN — AMIODARONE HYDROCHLORIDE 200 MG: 200 TABLET ORAL at 08:01

## 2023-01-07 RX ADMIN — CEFDINIR 300 MG: 300 CAPSULE ORAL at 08:01

## 2023-01-07 RX ADMIN — ENOXAPARIN SODIUM 40 MG: 100 INJECTION SUBCUTANEOUS at 08:01

## 2023-01-07 RX ADMIN — FUROSEMIDE 40 MG: 40 TABLET ORAL at 08:01

## 2023-01-07 RX ADMIN — POLYETHYLENE GLYCOL 3350 17 G: 17 POWDER, FOR SOLUTION ORAL at 08:01

## 2023-01-07 RX ADMIN — METFORMIN HYDROCHLORIDE 1000 MG: 500 TABLET, FILM COATED ORAL at 07:01

## 2023-01-07 RX ADMIN — OXYCODONE HYDROCHLORIDE 10 MG: 10 TABLET ORAL at 12:01

## 2023-01-07 RX ADMIN — OXYCODONE HYDROCHLORIDE 10 MG: 10 TABLET ORAL at 06:01

## 2023-01-07 RX ADMIN — ATORVASTATIN CALCIUM 40 MG: 40 TABLET, FILM COATED ORAL at 08:01

## 2023-01-07 RX ADMIN — INSULIN ASPART 3 UNITS: 100 INJECTION, SOLUTION INTRAVENOUS; SUBCUTANEOUS at 11:01

## 2023-01-07 RX ADMIN — OXYCODONE HYDROCHLORIDE 10 MG: 10 TABLET ORAL at 11:01

## 2023-01-07 RX ADMIN — PREDNISONE 5 MG: 5 TABLET ORAL at 08:01

## 2023-01-07 NOTE — PLAN OF CARE
Problem: Adult Inpatient Plan of Care  Goal: Plan of Care Review  Outcome: Ongoing, Progressing  Goal: Patient-Specific Goal (Individualized)  Outcome: Ongoing, Progressing  Goal: Absence of Hospital-Acquired Illness or Injury  Outcome: Ongoing, Progressing  Goal: Optimal Comfort and Wellbeing  Outcome: Ongoing, Progressing  Goal: Readiness for Transition of Care  Outcome: Ongoing, Progressing     Problem: Gas Exchange Impaired  Goal: Optimal Gas Exchange  Outcome: Ongoing, Progressing     Problem: Fall Injury Risk  Goal: Absence of Fall and Fall-Related Injury  Outcome: Ongoing, Progressing     Problem: Skin Injury Risk Increased  Goal: Skin Health and Integrity  Outcome: Ongoing, Progressing     Problem: Impaired Wound Healing  Goal: Optimal Wound Healing  Outcome: Ongoing, Progressing     Problem: Diabetes Comorbidity  Goal: Blood Glucose Level Within Targeted Range  Outcome: Ongoing, Progressing

## 2023-01-07 NOTE — H&P
[facility] Medicine  History & Physical    Patient Name: Karina Rojas  MRN: [id number]  Patient Class: IP- Swing  Admission Date: 1/4/2023  Attending Physician: Jorge Buckley DO   Primary Care Provider: Thalia Bowie NP         Patient information was obtained from patient, relative(s) and past medical records.     Subjective:     Principal Problem:<principal problem not specified>    Chief Complaint: No chief complaint on file.       HPI: Ms Rojas is an 83 y/o female admitted to [facility] for rehabilitation services following right hip fracture secondary to fall requiring Right hip arthroplasty. She initially presented to [facility] ED on 12/28 after trip and fall in home where she was found to have an acute comminuted and minimally displaced right intertrochanteric fracture. She was transferred to [facility] for ortho intervention. She underwent surgical repair of right hip on 12/30. Non-removable surgical dressing to be removed 10 days post op (1/9). Staples to be removed 14 days post op (1/13) and steri strips applied. Ortho follow up in 3 weeks. Prior to fall, she was ambulatory with walker dependence. She is on chronic O2 @ 2L NC with hx of COPD. She will be admitted for PT/OT rehabilitation with hopes to return home when goals are met.     Code Status: FULL CODE    PMH: HTN, Atrial Fibrillation on coumadin, DM, HF, COPD oxygen dependant.  On 2 liters at home      Past Medical History:   Diagnosis Date    COPD (chronic obstructive pulmonary disease)     Diabetes mellitus     DVT (deep venous thrombosis)     High cholesterol     Hypertension     Paroxysmal atrial fibrillation        Past Surgical History:   Procedure Laterality Date    APPENDECTOMY      BACK SURGERY  1980 1984    BILATERAL TUBAL LIGATION  1994    CARDIAC SURGERY  1990    aorta bypass    caritod Left     HERNIA REPAIR      intestine repair         Review of patient's allergies indicates:    Allergen Reactions    Tylenol [acetaminophen] Anaphylaxis       No current facility-administered medications on file prior to encounter.     Current Outpatient Medications on File Prior to Encounter   Medication Sig    amiodarone (PACERONE) 200 MG Tab Take 200 mg by mouth 2 (two) times daily. Take 1 tablet by mouth in the morning and 1 tablet before bedtime.    atorvastatin (LIPITOR) 40 MG tablet Take 40 mg by mouth.    digoxin (LANOXIN) 125 mcg tablet Take 125 mcg by mouth.    fluticasone/umeclidin/vilanter (TRELEGY ELLIPTA INHL) Inhale into the lungs daily as needed.    furosemide (LASIX) 40 MG tablet Take 40 mg by mouth once daily.    levothyroxine (SYNTHROID) 25 MCG tablet Take 25 mcg by mouth every morning.    metFORMIN (GLUCOPHAGE) 1000 MG tablet Take 1,000 mg by mouth 2 (two) times daily.    oxyCODONE (ROXICODONE) 10 mg Tab immediate release tablet Take 10 mg by mouth every 4 (four) hours as needed. Take 1 tablet by mouth every 4 hours as needed for up to 7 days    polyethylene glycol (GLYCOLAX) 17 gram PwPk Take 17 g by mouth once daily. Take 17g by mouth in the morning for 3 days.    predniSONE (DELTASONE) 5 MG tablet Take 5 mg by mouth.    warfarin (COUMADIN) 4 MG tablet Take 4 mg by mouth. Take 1 tablet by mouth 4 times a week.    warfarin (COUMADIN) 5 MG tablet Take by mouth.     Family History    None       Tobacco Use    Smoking status: Never    Smokeless tobacco: Never   Substance and Sexual Activity    Alcohol use: Not on file    Drug use: Not on file    Sexual activity: Not on file     Review of Systems   Constitutional:  Negative for activity change, chills and fever.   HENT:  Negative for sinus pressure and sinus pain.    Eyes:  Negative for pain and visual disturbance.   Respiratory:  Negative for cough, chest tightness, shortness of breath and wheezing.    Cardiovascular:  Negative for chest pain and palpitations.   Gastrointestinal:  Positive for constipation. Negative for  abdominal distention and abdominal pain.   Endocrine: Negative for cold intolerance and heat intolerance.   Genitourinary: Negative.    Musculoskeletal:  Positive for back pain. Negative for neck pain.   Skin:  Negative for color change and pallor.   Neurological:  Negative for dizziness and headaches.   Hematological:  Negative for adenopathy. Does not bruise/bleed easily.   Psychiatric/Behavioral:  Negative for agitation, behavioral problems and confusion.    Objective:     Vital Signs (Most Recent):  Temp: 98.3 °F (36.8 °C) (01/06/23 2014)  Pulse: 91 (01/06/23 2014)  Resp: 20 (01/06/23 2014)  BP: (!) 127/55 (01/06/23 2014)  SpO2: 97 % (01/06/23 2014)   Vital Signs (24h Range):  Temp:  [97.8 °F (36.6 °C)-98.3 °F (36.8 °C)] 98.3 °F (36.8 °C)  Pulse:  [60-91] 91  Resp:  [15-20] 20  SpO2:  [91 %-98 %] 97 %  BP: (127-132)/(42-55) 127/55     Weight:  (pt states she's weak and can't stand up)  Body mass index is 24.94 kg/m².    Physical Exam  Vitals reviewed.   Constitutional:       General: She is not in acute distress.     Appearance: Normal appearance. She is not toxic-appearing.   HENT:      Head: Normocephalic and atraumatic.      Right Ear: External ear normal.      Left Ear: External ear normal.   Eyes:      General: No scleral icterus.  Cardiovascular:      Rate and Rhythm: Normal rate. Rhythm irregular.      Heart sounds: Normal heart sounds.   Pulmonary:      Effort: Pulmonary effort is normal. No respiratory distress.      Breath sounds: Normal breath sounds. No wheezing, rhonchi or rales.   Abdominal:      General: Abdomen is flat. Bowel sounds are normal. There is no distension.      Palpations: Abdomen is soft.      Tenderness: There is no abdominal tenderness. There is no guarding.   Musculoskeletal:         General: Normal range of motion.      Right lower leg: No edema.      Left lower leg: No edema.   Skin:     General: Skin is warm and dry.      Capillary Refill: Capillary refill takes less than 2  seconds.      Coloration: Skin is not jaundiced.      Findings: Bruising present. No rash.   Neurological:      General: No focal deficit present.      Mental Status: She is alert and oriented to person, place, and time.   Psychiatric:         Mood and Affect: Mood normal.         Behavior: Behavior normal.           Significant Labs: All pertinent labs within the past 24 hours have been reviewed.  BMP: No results for input(s): GLU, NA, K, CL, CO2, BUN, CREATININE, CALCIUM, MG in the last 48 hours.  CBC: No results for input(s): WBC, HGB, HCT, PLT in the last 48 hours.    Significant Imaging: I have reviewed all pertinent imaging results/findings within the past 24 hours.    Assessment/Plan:     S/P total right hip arthroplasty  PT eval and treat  OT eval and treat  ST eval and treat  Resp consult  Incision site care as directed by ortho  Staple removal as directed by ortho  Ortho follow up in 3 weeks      Atrial fibrillation  Rate control with amiodarone and dig.  Stroke ppx with Coumadin.  Coumadin has been resumed post op and INR trending out but not quite therapeutic. (D/c lovenox for DVT ppx when INR 2)      COPD exacerbation  Continue current med regimen.  Lungs clear, good air movement.   O2 @ 2L NC      Diabetes  Last A1c reviewed-   Lab Results   Component Value Date    HGBA1C 7.5 (H) 12/29/2022     Antihyperglycemics (From admission, onward)    Start     Stop Route Frequency Ordered    01/04/23 1417  insulin aspart U-100 injection 0-5 Units         -- SubQ Before meals & nightly PRN 01/04/23 1319        Continue home meds,   Mild SSI with hypoglycemia protocol  Diabetic diet.  Most glucoses acceptable. A few excursions above hospital goal. Monitor.     Acute cystitis without hematuria    Cefdinir. Follow cx.  >100k GNR    Presence of surgical incision  Dressing to be removed 10 days post-op (1/9), then cleanse daily with hydrogen peroxide, apply Bactroban and cover with 4x4 and secure with paper tape.    Staple removal 14 days post op and apply steri strips   Monitor for signs of infection at site      HTN (hypertension)  Continue home meds  Routine VS monitoring, parameters set for provider notification        VTE Risk Mitigation (From admission, onward)         Ordered     warfarin (COUMADIN) tablet 5 mg  Every Mon, Wed, Fri, Sun         01/06/23 0822     enoxaparin injection 40 mg  Daily         01/04/23 1615     Reason for no Mechanical VTE Prophylaxis  Once        Question:  Reasons:  Answer:  Physician Provided (leave comment)  Comment:  on coumadin    01/04/23 1319     IP VTE HIGH RISK PATIENT  Once         01/04/23 1319                   Jose Owens Jr, MD  Department of Hospital Medicine   [facility]

## 2023-01-07 NOTE — NURSING
Nurses Note -- 4 Eyes      1/7/2023   1:14 PM      Skin assessed during: Q Shift Change      [] No Pressure Injuries Present    [x]Prevention Measures Documented      [] Yes- Altered Skin Integrity Present or Discovered   [] LDA Added if Not in Epic (Describe Wound)   [x] New Altered Skin Integrity was Present on Admit and Documented in LDA   [] Wound Image Taken    Wound Care Consulted? No    Attending Nurse:  Brittni Wellington LPN     Second RN/Staff Member: TIAGO Sykes RN

## 2023-01-07 NOTE — SUBJECTIVE & OBJECTIVE
Past Medical History:   Diagnosis Date    COPD (chronic obstructive pulmonary disease)     Diabetes mellitus     DVT (deep venous thrombosis)     High cholesterol     Hypertension     Paroxysmal atrial fibrillation        Past Surgical History:   Procedure Laterality Date    APPENDECTOMY      BACK SURGERY  1980 1984    BILATERAL TUBAL LIGATION  1994    CARDIAC SURGERY  1990    aorta bypass    caritod Left     HERNIA REPAIR      intestine repair         Review of patient's allergies indicates:   Allergen Reactions    Tylenol [acetaminophen] Anaphylaxis       No current facility-administered medications on file prior to encounter.     Current Outpatient Medications on File Prior to Encounter   Medication Sig    amiodarone (PACERONE) 200 MG Tab Take 200 mg by mouth 2 (two) times daily. Take 1 tablet by mouth in the morning and 1 tablet before bedtime.    atorvastatin (LIPITOR) 40 MG tablet Take 40 mg by mouth.    digoxin (LANOXIN) 125 mcg tablet Take 125 mcg by mouth.    fluticasone/umeclidin/vilanter (TRELEGY ELLIPTA INHL) Inhale into the lungs daily as needed.    furosemide (LASIX) 40 MG tablet Take 40 mg by mouth once daily.    levothyroxine (SYNTHROID) 25 MCG tablet Take 25 mcg by mouth every morning.    metFORMIN (GLUCOPHAGE) 1000 MG tablet Take 1,000 mg by mouth 2 (two) times daily.    oxyCODONE (ROXICODONE) 10 mg Tab immediate release tablet Take 10 mg by mouth every 4 (four) hours as needed. Take 1 tablet by mouth every 4 hours as needed for up to 7 days    polyethylene glycol (GLYCOLAX) 17 gram PwPk Take 17 g by mouth once daily. Take 17g by mouth in the morning for 3 days.    predniSONE (DELTASONE) 5 MG tablet Take 5 mg by mouth.    warfarin (COUMADIN) 4 MG tablet Take 4 mg by mouth. Take 1 tablet by mouth 4 times a week.    warfarin (COUMADIN) 5 MG tablet Take by mouth.     Family History    None       Tobacco Use    Smoking status: Never    Smokeless tobacco: Never   Substance and Sexual Activity     Alcohol use: Not on file    Drug use: Not on file    Sexual activity: Not on file     Review of Systems   Constitutional:  Negative for activity change, chills and fever.   HENT:  Negative for sinus pressure and sinus pain.    Eyes:  Negative for pain and visual disturbance.   Respiratory:  Negative for cough, chest tightness, shortness of breath and wheezing.    Cardiovascular:  Negative for chest pain and palpitations.   Gastrointestinal:  Positive for constipation. Negative for abdominal distention and abdominal pain.   Endocrine: Negative for cold intolerance and heat intolerance.   Genitourinary: Negative.    Musculoskeletal:  Positive for back pain. Negative for neck pain.   Skin:  Negative for color change and pallor.   Neurological:  Negative for dizziness and headaches.   Hematological:  Negative for adenopathy. Does not bruise/bleed easily.   Psychiatric/Behavioral:  Negative for agitation, behavioral problems and confusion.    Objective:     Vital Signs (Most Recent):  Temp: 98.3 °F (36.8 °C) (01/06/23 2014)  Pulse: 91 (01/06/23 2014)  Resp: 20 (01/06/23 2014)  BP: (!) 127/55 (01/06/23 2014)  SpO2: 97 % (01/06/23 2014)   Vital Signs (24h Range):  Temp:  [97.8 °F (36.6 °C)-98.3 °F (36.8 °C)] 98.3 °F (36.8 °C)  Pulse:  [60-91] 91  Resp:  [15-20] 20  SpO2:  [91 %-98 %] 97 %  BP: (127-132)/(42-55) 127/55     Weight:  (pt states she's weak and can't stand up)  Body mass index is 24.94 kg/m².    Physical Exam  Vitals reviewed.   Constitutional:       General: She is not in acute distress.     Appearance: Normal appearance. She is not toxic-appearing.   HENT:      Head: Normocephalic and atraumatic.      Right Ear: External ear normal.      Left Ear: External ear normal.   Eyes:      General: No scleral icterus.  Cardiovascular:      Rate and Rhythm: Normal rate. Rhythm irregular.      Heart sounds: Normal heart sounds.   Pulmonary:      Effort: Pulmonary effort is normal. No respiratory distress.      Breath  sounds: Normal breath sounds. No wheezing, rhonchi or rales.   Abdominal:      General: Abdomen is flat. Bowel sounds are normal. There is no distension.      Palpations: Abdomen is soft.      Tenderness: There is no abdominal tenderness. There is no guarding.   Musculoskeletal:         General: Normal range of motion.      Right lower leg: No edema.      Left lower leg: No edema.   Skin:     General: Skin is warm and dry.      Capillary Refill: Capillary refill takes less than 2 seconds.      Coloration: Skin is not jaundiced.      Findings: Bruising present. No rash.   Neurological:      General: No focal deficit present.      Mental Status: She is alert and oriented to person, place, and time.   Psychiatric:         Mood and Affect: Mood normal.         Behavior: Behavior normal.           Significant Labs: All pertinent labs within the past 24 hours have been reviewed.  BMP: No results for input(s): GLU, NA, K, CL, CO2, BUN, CREATININE, CALCIUM, MG in the last 48 hours.  CBC: No results for input(s): WBC, HGB, HCT, PLT in the last 48 hours.    Significant Imaging: I have reviewed all pertinent imaging results/findings within the past 24 hours.

## 2023-01-07 NOTE — NURSING
Pt. States she has pain in the back of R calf. No acute abnormality noted. All pulses present in foot, ankle, and popliteal. No shortness of breath reported

## 2023-01-07 NOTE — PLAN OF CARE
Problem: Gas Exchange Impaired  Goal: Optimal Gas Exchange  Outcome: Ongoing, Progressing     Problem: Adjustment to Illness COPD (Chronic Obstructive Pulmonary Disease)  Goal: Optimal Chronic Illness Coping  Outcome: Ongoing, Progressing     Problem: Functional Ability Impaired COPD (Chronic Obstructive Pulmonary Disease)  Goal: Optimal Level of Functional [place]  Outcome: Ongoing, Progressing     Problem: Infection COPD (Chronic Obstructive Pulmonary Disease)  Goal: Absence of Infection Signs and Symptoms  Outcome: Ongoing, Progressing     Problem: Oral Intake Inadequate COPD (Chronic Obstructive Pulmonary Disease)  Goal: Improved Nutrition Intake  Outcome: Ongoing, Progressing     Problem: Respiratory Compromise COPD (Chronic Obstructive Pulmonary Disease)  Goal: Effective Oxygenation and Ventilation  Outcome: Ongoing, Progressing

## 2023-01-07 NOTE — NURSING
"Patient c/o pain to rt. Calf, warm to touch, faint pulse noted in rt. Foot, patient states" I always have a faint pulse, I had a bypass in the past"notified er np of pain.  "

## 2023-01-07 NOTE — PLAN OF CARE
Problem: Adult Inpatient Plan of Care  Goal: Plan of Care Review  Outcome: Ongoing, Progressing  Goal: Patient-Specific Goal (Individualized)  Outcome: Ongoing, Progressing  Goal: Absence of Hospital-Acquired Illness or Injury  Outcome: Ongoing, Progressing  Goal: Optimal Comfort and Wellbeing  Outcome: Ongoing, Progressing  Goal: Readiness for Transition of Care  Outcome: Ongoing, Progressing     Problem: Gas Exchange Impaired  Goal: Optimal Gas Exchange  Outcome: Ongoing, Progressing     Problem: Fall Injury Risk  Goal: Absence of Fall and Fall-Related Injury  Outcome: Ongoing, Progressing     Problem: Skin Injury Risk Increased  Goal: Skin Health and Integrity  Outcome: Ongoing, Progressing     Problem: Impaired Wound Healing  Goal: Optimal Wound Healing  Outcome: Ongoing, Progressing     Problem: Diabetes Comorbidity  Goal: Blood Glucose Level Within Targeted Range  Outcome: Ongoing, Progressing     Problem: Adjustment to Illness COPD (Chronic Obstructive Pulmonary Disease)  Goal: Optimal Chronic Illness Coping  Outcome: Ongoing, Progressing     Problem: Functional Ability Impaired COPD (Chronic Obstructive Pulmonary Disease)  Goal: Optimal Level of Functional [place]  Outcome: Ongoing, Progressing     Problem: Infection COPD (Chronic Obstructive Pulmonary Disease)  Goal: Absence of Infection Signs and Symptoms  Outcome: Ongoing, Progressing     Problem: Oral Intake Inadequate COPD (Chronic Obstructive Pulmonary Disease)  Goal: Improved Nutrition Intake  Outcome: Ongoing, Progressing     Problem: Respiratory Compromise COPD (Chronic Obstructive Pulmonary Disease)  Goal: Effective Oxygenation and Ventilation  Outcome: Ongoing, Progressing

## 2023-01-08 LAB
GLUCOSE SERPL-MCNC: 138 MG/DL (ref 70–105)
GLUCOSE SERPL-MCNC: 162 MG/DL (ref 70–105)
GLUCOSE SERPL-MCNC: 247 MG/DL (ref 70–105)
GLUCOSE SERPL-MCNC: 282 MG/DL (ref 70–105)
INR BLD: 1.82
PROTHROMBIN TIME: 21.1 SECONDS (ref 11.7–14.7)
UA COMPLETE W REFLEX CULTURE PNL UR: ABNORMAL

## 2023-01-08 PROCEDURE — 25000003 PHARM REV CODE 250: Performed by: NURSE PRACTITIONER

## 2023-01-08 PROCEDURE — 94761 N-INVAS EAR/PLS OXIMETRY MLT: CPT

## 2023-01-08 PROCEDURE — 82962 GLUCOSE BLOOD TEST: CPT

## 2023-01-08 PROCEDURE — 11000004 HC SNF PRIVATE

## 2023-01-08 PROCEDURE — 63600175 PHARM REV CODE 636 W HCPCS: Performed by: NURSE PRACTITIONER

## 2023-01-08 PROCEDURE — 27000221 HC OXYGEN, UP TO 24 HOURS

## 2023-01-08 PROCEDURE — 85610 PROTHROMBIN TIME: CPT | Performed by: NURSE PRACTITIONER

## 2023-01-08 PROCEDURE — 36415 COLL VENOUS BLD VENIPUNCTURE: CPT | Performed by: NURSE PRACTITIONER

## 2023-01-08 RX ORDER — POLYETHYLENE GLYCOL 3350 17 G/17G
17 POWDER, FOR SOLUTION ORAL DAILY
Status: DISCONTINUED | OUTPATIENT
Start: 2023-01-08 | End: 2023-01-17 | Stop reason: HOSPADM

## 2023-01-08 RX ORDER — NITROFURANTOIN 25; 75 MG/1; MG/1
100 CAPSULE ORAL EVERY 12 HOURS
Status: COMPLETED | OUTPATIENT
Start: 2023-01-08 | End: 2023-01-12

## 2023-01-08 RX ADMIN — OXYCODONE HYDROCHLORIDE 10 MG: 10 TABLET ORAL at 11:01

## 2023-01-08 RX ADMIN — Medication 6 MG: at 08:01

## 2023-01-08 RX ADMIN — PREDNISONE 5 MG: 5 TABLET ORAL at 08:01

## 2023-01-08 RX ADMIN — METFORMIN HYDROCHLORIDE 1000 MG: 500 TABLET, FILM COATED ORAL at 08:01

## 2023-01-08 RX ADMIN — ATORVASTATIN CALCIUM 40 MG: 40 TABLET, FILM COATED ORAL at 08:01

## 2023-01-08 RX ADMIN — ENOXAPARIN SODIUM 40 MG: 100 INJECTION SUBCUTANEOUS at 08:01

## 2023-01-08 RX ADMIN — SENNOSIDES 8.6 MG: 8.6 TABLET, FILM COATED ORAL at 08:01

## 2023-01-08 RX ADMIN — INSULIN ASPART 3 UNITS: 100 INJECTION, SOLUTION INTRAVENOUS; SUBCUTANEOUS at 11:01

## 2023-01-08 RX ADMIN — AMIODARONE HYDROCHLORIDE 200 MG: 200 TABLET ORAL at 08:01

## 2023-01-08 RX ADMIN — OXYCODONE HYDROCHLORIDE 10 MG: 10 TABLET ORAL at 03:01

## 2023-01-08 RX ADMIN — LEVOTHYROXINE SODIUM 25 MCG: 0.03 TABLET ORAL at 05:01

## 2023-01-08 RX ADMIN — POLYETHYLENE GLYCOL 3350 17 G: 17 POWDER, FOR SOLUTION ORAL at 09:01

## 2023-01-08 RX ADMIN — DIGOXIN 0.12 MG: 125 TABLET ORAL at 08:01

## 2023-01-08 RX ADMIN — CEFDINIR 300 MG: 300 CAPSULE ORAL at 08:01

## 2023-01-08 RX ADMIN — WARFARIN SODIUM 5 MG: 5 TABLET ORAL at 04:01

## 2023-01-08 RX ADMIN — NITROFURANTOIN (MONOHYDRATE/MACROCRYSTALS) 100 MG: 75; 25 CAPSULE ORAL at 11:01

## 2023-01-08 RX ADMIN — OXYCODONE HYDROCHLORIDE 10 MG: 10 TABLET ORAL at 08:01

## 2023-01-08 RX ADMIN — FUROSEMIDE 40 MG: 40 TABLET ORAL at 08:01

## 2023-01-08 RX ADMIN — ONDANSETRON 4 MG: 4 TABLET, ORALLY DISINTEGRATING ORAL at 07:01

## 2023-01-08 RX ADMIN — INSULIN ASPART 1 UNITS: 100 INJECTION, SOLUTION INTRAVENOUS; SUBCUTANEOUS at 08:01

## 2023-01-08 RX ADMIN — MAGNESIUM HYDROXIDE 2400 MG: 1200 LIQUID ORAL at 10:01

## 2023-01-08 RX ADMIN — NITROFURANTOIN (MONOHYDRATE/MACROCRYSTALS) 100 MG: 75; 25 CAPSULE ORAL at 08:01

## 2023-01-08 NOTE — PLAN OF CARE
Problem: Gas Exchange Impaired  Goal: Optimal Gas Exchange  Outcome: Ongoing, Progressing     Problem: Adjustment to Illness COPD (Chronic Obstructive Pulmonary Disease)  Goal: Optimal Chronic Illness Coping  Outcome: Ongoing, Progressing     Problem: Functional Ability Impaired COPD (Chronic Obstructive Pulmonary Disease)  Goal: Optimal Level of Functional [place]  Outcome: Ongoing, Progressing     Problem: Infection COPD (Chronic Obstructive Pulmonary Disease)  Goal: Absence of Infection Signs and Symptoms  Outcome: Ongoing, Progressing     Problem: Oral Intake Inadequate COPD (Chronic Obstructive Pulmonary Disease)  Goal: Improved Nutrition Intake  Outcome: Ongoing, Progressing     Problem: Respiratory Compromise COPD (Chronic Obstructive Pulmonary Disease)  Goal: Effective Oxygenation and Ventilation  Outcome: Ongoing, Progressing

## 2023-01-08 NOTE — PLAN OF CARE
Problem: Adult Inpatient Plan of Care  Goal: Plan of Care Review  Outcome: Ongoing, Progressing  Goal: Patient-Specific Goal (Individualized)  Outcome: Ongoing, Progressing  Goal: Absence of Hospital-Acquired Illness or Injury  Outcome: Ongoing, Progressing  Goal: Optimal Comfort and Wellbeing  Outcome: Ongoing, Progressing  Goal: Readiness for Transition of Care  Outcome: Ongoing, Progressing     Problem: Gas Exchange Impaired  Goal: Optimal Gas Exchange  Outcome: Ongoing, Progressing     Problem: Fall Injury Risk  Goal: Absence of Fall and Fall-Related Injury  Outcome: Ongoing, Progressing     Problem: Skin Injury Risk Increased  Goal: Skin Health and Integrity  Outcome: Ongoing, Progressing     Problem: Impaired Wound Healing  Goal: Optimal Wound Healing  Outcome: Ongoing, Progressing     Problem: Diabetes Comorbidity  Goal: Blood Glucose Level Within Targeted Range  Outcome: Ongoing, Progressing     Problem: Adjustment to Illness COPD (Chronic Obstructive Pulmonary Disease)  Goal: Optimal Chronic Illness Coping  Outcome: Ongoing, Progressing     Problem: Functional Ability Impaired COPD (Chronic Obstructive Pulmonary Disease)  Goal: Optimal Level of Functional [place]  Outcome: Ongoing, Progressing     Problem: Infection COPD (Chronic Obstructive Pulmonary Disease)  Goal: Absence of Infection Signs and Symptoms  Outcome: Ongoing, Progressing     Problem: Oral Intake Inadequate COPD (Chronic Obstructive Pulmonary Disease)  Goal: Improved Nutrition Intake  Outcome: Ongoing, Progressing     Problem: Respiratory Compromise COPD (Chronic Obstructive Pulmonary Disease)  Goal: Effective Oxygenation and Ventilation  Outcome: Ongoing, Progressing

## 2023-01-09 LAB
GLUCOSE SERPL-MCNC: 111 MG/DL (ref 70–105)
GLUCOSE SERPL-MCNC: 171 MG/DL (ref 70–105)
GLUCOSE SERPL-MCNC: 214 MG/DL (ref 70–105)
GLUCOSE SERPL-MCNC: 396 MG/DL (ref 70–105)

## 2023-01-09 PROCEDURE — 97535 SELF CARE MNGMENT TRAINING: CPT

## 2023-01-09 PROCEDURE — 99900035 HC TECH TIME PER 15 MIN (STAT)

## 2023-01-09 PROCEDURE — 63600175 PHARM REV CODE 636 W HCPCS: Performed by: NURSE PRACTITIONER

## 2023-01-09 PROCEDURE — 27000221 HC OXYGEN, UP TO 24 HOURS

## 2023-01-09 PROCEDURE — 82962 GLUCOSE BLOOD TEST: CPT

## 2023-01-09 PROCEDURE — 63600175 PHARM REV CODE 636 W HCPCS: Performed by: HOSPITALIST

## 2023-01-09 PROCEDURE — 97110 THERAPEUTIC EXERCISES: CPT | Mod: CQ

## 2023-01-09 PROCEDURE — 25000003 PHARM REV CODE 250: Performed by: NURSE PRACTITIONER

## 2023-01-09 PROCEDURE — 97530 THERAPEUTIC ACTIVITIES: CPT

## 2023-01-09 PROCEDURE — 99308 PR NURSING FAC CARE, SUBSEQ, MINOR COMPLIC: ICD-10-PCS | Mod: ,,, | Performed by: HOSPITALIST

## 2023-01-09 PROCEDURE — 99308 SBSQ NF CARE LOW MDM 20: CPT | Mod: ,,, | Performed by: HOSPITALIST

## 2023-01-09 PROCEDURE — 11000004 HC SNF PRIVATE

## 2023-01-09 PROCEDURE — 94761 N-INVAS EAR/PLS OXIMETRY MLT: CPT

## 2023-01-09 RX ORDER — ENOXAPARIN SODIUM 100 MG/ML
1 INJECTION SUBCUTANEOUS ONCE
Status: COMPLETED | OUTPATIENT
Start: 2023-01-09 | End: 2023-01-09

## 2023-01-09 RX ADMIN — WARFARIN SODIUM 5 MG: 5 TABLET ORAL at 05:01

## 2023-01-09 RX ADMIN — ENOXAPARIN SODIUM 40 MG: 100 INJECTION SUBCUTANEOUS at 08:01

## 2023-01-09 RX ADMIN — OXYCODONE HYDROCHLORIDE 10 MG: 10 TABLET ORAL at 01:01

## 2023-01-09 RX ADMIN — NITROFURANTOIN (MONOHYDRATE/MACROCRYSTALS) 100 MG: 75; 25 CAPSULE ORAL at 08:01

## 2023-01-09 RX ADMIN — POLYETHYLENE GLYCOL 3350 17 G: 17 POWDER, FOR SOLUTION ORAL at 08:01

## 2023-01-09 RX ADMIN — INSULIN ASPART 2 UNITS: 100 INJECTION, SOLUTION INTRAVENOUS; SUBCUTANEOUS at 05:01

## 2023-01-09 RX ADMIN — ATORVASTATIN CALCIUM 40 MG: 40 TABLET, FILM COATED ORAL at 08:01

## 2023-01-09 RX ADMIN — AMIODARONE HYDROCHLORIDE 200 MG: 200 TABLET ORAL at 08:01

## 2023-01-09 RX ADMIN — OXYCODONE HYDROCHLORIDE 10 MG: 10 TABLET ORAL at 08:01

## 2023-01-09 RX ADMIN — METFORMIN HYDROCHLORIDE 1000 MG: 500 TABLET, FILM COATED ORAL at 08:01

## 2023-01-09 RX ADMIN — DIGOXIN 0.12 MG: 125 TABLET ORAL at 08:01

## 2023-01-09 RX ADMIN — ENOXAPARIN SODIUM 70 MG: 80 INJECTION SUBCUTANEOUS at 08:01

## 2023-01-09 RX ADMIN — LEVOTHYROXINE SODIUM 25 MCG: 0.03 TABLET ORAL at 05:01

## 2023-01-09 RX ADMIN — OXYCODONE HYDROCHLORIDE 10 MG: 10 TABLET ORAL at 12:01

## 2023-01-09 RX ADMIN — Medication 6 MG: at 08:01

## 2023-01-09 RX ADMIN — SENNOSIDES 8.6 MG: 8.6 TABLET, FILM COATED ORAL at 08:01

## 2023-01-09 RX ADMIN — FUROSEMIDE 40 MG: 40 TABLET ORAL at 08:01

## 2023-01-09 RX ADMIN — PREDNISONE 5 MG: 5 TABLET ORAL at 08:01

## 2023-01-09 RX ADMIN — INSULIN ASPART 5 UNITS: 100 INJECTION, SOLUTION INTRAVENOUS; SUBCUTANEOUS at 10:01

## 2023-01-09 NOTE — PLAN OF CARE
Problem: Gas Exchange Impaired  Goal: Optimal Gas Exchange  Outcome: Ongoing, Progressing     Problem: Adjustment to Illness COPD (Chronic Obstructive Pulmonary Disease)  Goal: Optimal Chronic Illness Coping  Outcome: Ongoing, Progressing     Problem: Functional Ability Impaired COPD (Chronic Obstructive Pulmonary Disease)  Goal: Optimal Level of Functional [place]  Outcome: Ongoing, Progressing     Problem: Infection COPD (Chronic Obstructive Pulmonary Disease)  Goal: Absence of Infection Signs and Symptoms  Outcome: Ongoing, Progressing     Problem: Oral Intake Inadequate COPD (Chronic Obstructive Pulmonary Disease)  Goal: Improved Nutrition Intake  Outcome: Ongoing, Progressing     Problem: Respiratory Compromise COPD (Chronic Obstructive Pulmonary Disease)  Goal: Effective Oxygenation and Ventilation  Outcome: Ongoing, Progressing

## 2023-01-09 NOTE — PROGRESS NOTES
Pt not seen for AM PT treatment due to patient reporting increased R calf pain. PTA contacted supervising PT who stated to withhold standing exercises and to perform exercises on LLE. Will follow up this afternoon.

## 2023-01-09 NOTE — PT/OT/SLP PROGRESS
Physical Therapy Treatment    Patient Name:  Karina Rojas   MRN:  [id number]    Recommendations:     Discharge Recommendations: home with home health  Discharge Equipment Recommendations: none  Barriers to discharge: Decreased caregiver support    Assessment:     Karina Rojas is a 82 y.o. female admitted with a medical diagnosis of <principal problem not specified>.  She presents with the following impairments/functional limitations: weakness, impaired endurance, impaired self care skills, impaired functional mobility, gait instability, impaired balance, decreased lower extremity function, pain, impaired skin, impaired cardiopulmonary response to activity. OT informed PTA that Dr. Buckley said to go ahead and treat patient since she is on blood thinner. Exercises performed in bed on LLE this date due to reports of R calf pain with movement. Occasional rest breaks required due to noted fatigue. SLRs stopped after 11 repetitions due to patient stating that it was causing pain in her back. Patient with no reports of adverse effects after completion of treatment.     Rehab Prognosis: Fair; patient would benefit from acute skilled PT services to address these deficits and reach maximum level of function.    Recent Surgery: * No surgery found *      Plan:     During this hospitalization, patient to be seen BID to address the identified rehab impairments via therapeutic exercises and progress toward the following goals:    Plan of Care Expires:  02/03/23    Subjective     Chief Complaint: Patient stated that she had pain in her back, and also that she had pain while moving her calf and she stated that it was tender to the touch.   Patient/Family Comments/goals: DC home with home health when able.  Pain/Comfort:  Pain Rating 1: 5/10  Location - Side 1: Bilateral  Location - Orientation 1: generalized  Location 1: back  Pain Addressed 1: Pre-medicate for activity      Objective:     Communicated with pt and supervising  PT prior to session. Patient found HOB elevated with oxygen upon PTA entry to room.     General Precautions: Standard, diabetic, fall  Orthopedic Precautions: RLE weight bearing as tolerated  Braces: N/A  Respiratory Status: Nasal cannula, flow 2 L/min     Functional Mobility:  Bed Mobility:   Not performed this treatment.       AM-PAC 6 CLICK MOBILITY  Turning over in bed (including adjusting bedclothes, sheets and blankets)?: 3  Sitting down on and standing up from a chair with arms (e.g., wheelchair, bedside commode, etc.): 2  Moving from lying on back to sitting on the side of the bed?: 2  Moving to and from a bed to a chair (including a wheelchair)?: 2  Need to walk in hospital room?: 2  Climbing 3-5 steps with a railing?: 1  Basic Mobility Total Score: 12       Treatment & Education:  Patient performed the following therapeutic exercises in bed on LLE: SLRs 11x, Hip ABD/ADD 10 x 2, SAQs 2 x 10, glute sets 20x, ankle pumps 20x, quad sets 20x     Patient left HOB elevated with BLEs elevated with heels floating with call button in reach.    GOALS:   Multidisciplinary Problems       Physical Therapy Goals          Problem: Physical Therapy    Goal Priority Disciplines Outcome Goal Variances Interventions   Physical Therapy Goal     PT, PT/OT Ongoing, Progressing     Description: STG - 2 weeks  1. Pt will perform sit to/from stand and bed transfer with min A x 1.  2. Pt will transfer supine to/from sit with min A x 1.  3. Pt will amb with RW/O2 25 ft with min A x 1.      LTG - 4 weeks  1. Pt will perform sit to/from stand and bed transfer with SBA  2. Pt will transfer supine to/from sit with SBA.  3. Pt will amb with RW/O2 25 ft with SBA.                           Time Tracking:     PT Received On: 01/09/23  PT Start Time: 1512     PT Stop Time: 1530  PT Total Time (min): 18 min     Billable Minutes: Therapeutic Exercise 18    Treatment Type: Treatment  PT/PTA: PTA     PTA Visit Number: 4     01/09/2023

## 2023-01-09 NOTE — NURSING
Nurses Note -- 4 Eyes      1/8/2023   7:29 PM      Skin assessed during: Q Shift Change      [] No Pressure Injuries Present    [x]Prevention Measures Documented       Yes- Altered Skin Integrity Present or Discovered   [] LDA Added if Not in Epic (Describe Wound)   [] New Altered Skin Integrity was Present on Admit and Documented in LDA   [] Wound Image Taken    Wound Care Consulted? No    Attending Nurse:  Teofilo Lizarraga LPN     Second RN/Staff Member:  Isabelle Knutson RN

## 2023-01-09 NOTE — HOSPITAL COURSE
1/9 Follow up today, in bed, c/o back pain which is chronic.  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  Currently on Lovenox/Coumadin bridge.  Had some leg warmth.     1/13 US neg for DVT.  INR still elevated but pharmacy following.  She is doing well and motivated to improve.     1/17 Doing well.  Ready for DC.  Did well with therapy.  Had some time adjusting Coumadin but now within range.  Will DC home and follow up with PCP.

## 2023-01-09 NOTE — PT/OT/SLP PROGRESS
"Occupational Therapy  Treatment    Karina Rojas   MRN: [id number]   Admitting Diagnosis: <principal problem not specified>    OT Date of Treatment: 01/09/23   OT Start Time: 1400  OT Stop Time: 1425  OT Total Time (min): 25 min    Billable Minutes:  Self Care/Home Management 8 and Therapeutic Activity 15, therex  2 min    OT/HELENA: OT          General Precautions: Standard,    Orthopedic Precautions:    Braces: N/A  Respiratory Status: Nasal cannula, flow 2 L/min         Subjective:  Communicated with pt prior to session.  No new complaints except her right calf was tender as noted per nursing notes over the past couple days.  Pt states, "It's a lot better."  Upon asking physician about pt ability to participate in therapy with this complaint, he states, "she's on blood thinners.  It'll be fine, it'll make it worse to not get up and get going."  Pain/Comfort  Pain Rating 1:  (no significant pain related at onset of tx)    Objective:        Functional Mobility:  Bed Mobility:  min a overall        Transfers: min a        Functional Ambulation: used RW to mobilize 4 steps to BSC then back to bed again with CGA     Activities of Daily Living:     Feeding adaptive equipment:  none     UE adaptive equipment: none     LE adaptive equipment: Reacher and Sock aid                     Bathing adaptive equipment: long-handled sponge    Balance:   Static Sit: FAIR+: Able to take MINIMAL challenges from all directions  Dynamic Sit: FAIR+: Maintains balance through MINIMAL excursions of active trunk motion  Static Stand: FAIR: Maintains without assist but unable to take challenges  Dynamic stand: FAIR: Needs CONTACT GUARD during gait    Therapeutic Activities and Exercises:  OT engaged pt in seated exercises as well as standing exercises to promote UB strength and AROM.  OT also assisted pt in completing bed mobility as noted above, as well as several steps with RW to get to BSC.  Discussed with pt the need to try bowel and " "bladder program to retrain her bladder for holding urine to control flow and be able to use BSC.  Pt relates good understanding and also states, "well, I have this UTI too and I think that contributes to it."    AM-PAC 6 CLICK ADL   How much help from another person does this patient currently need?   1 = Unable, Total/Dependent Assistance  2 = A lot, Maximum/Moderate Assistance  3 = A little, Minimum/Contact Guard/Supervision  4 = None, Modified [place]/Independent    Putting on and taking off regular lower body clothing? : 2  Bathing (including washing, rinsing, drying)?: 3  Toileting, which includes using toilet, bedpan, or urinal? : 3  Putting on and taking off regular upper body clothing?: 4  Taking care of personal grooming such as brushing teeth?: 4  Eating meals?: 4  Daily Activity Total Score: 20     AM-PAC Raw Score CMS "G-Code Modifier Level of Impairment Assistance   6 % Total / Unable   7 - 8 CM 80 - 100% Maximal Assist   9-13 CL 60 - 80% Moderate Assist   14 - 19 CK 40 - 60% Moderate Assist   20 - 22 CJ 20 - 40% Minimal Assist   23 CI 1-20% SBA / CGA   24 CH 0% Independent/ Mod I       Patient left HOB elevated due to complaint of back pain beginning to kick up post therapy session with call button in reach     ASSESSMENT:  Karina Rojas is a 82 y.o. female with a medical diagnosis of post LTHA and presents with complaints of some tenderness in R calf.  Nursing/physician aware.    Rehab identified problem list/impairments:  weakness, impaired endurance, impaired self care skills, impaired functional mobility, gait instability, impaired balance, decreased lower extremity function, pain, decreased ROM, impaired skin, impaired cardiopulmonary response to activity, orthopedic precautions    Rehab potential is excellent.    Activity tolerance: Good    Discharge recommendations:     Barriers to discharge: Barriers to Discharge: None    Equipment recommendations: hip kit    GOALS: "   Multidisciplinary Problems       Occupational Therapy Goals          Problem: Occupational Therapy    Goal Priority Disciplines Outcome Interventions   Occupational Therapy Goal     OT, PT/OT Ongoing, Progressing    Description: STG: within 2 weeks  Pt will perform grooming with setup at sinkside MET  Pt will bathe with min a at bedside MET  Pt will perform UE dressing with SVN bedside MET  Pt will perform LE dressing with min a to SBA with AE as needed  ONGOING/PROGRESSING  Pt will sit EOB x 15 min with no assistance MET  Pt will transfer bed/chair/bsc with mod a MET and exceeded  Pt will perform standing task x 3 min with cga/sba assistance MET  Pt will tolerate 45 minutes of tx without fatigue ONGOING/PROGRESSING      LT.Restore to max I with self care and mobility.                          Plan:  Patient to be seen 5 x/week to address the above listed problems via self-care/home management, therapeutic activities, therapeutic exercises, wheelchair management/training  Plan of Care expires: 02/10/23  Plan of Care reviewed with: patient, caregiver, daughter         2023

## 2023-01-09 NOTE — PLAN OF CARE
Problem: Occupational Therapy  Goal: Occupational Therapy Goal  Description: STG: within 2 weeks  Pt will perform grooming with setup at sinkside MET  Pt will bathe with min a at bedside MET  Pt will perform UE dressing with SVN bedside MET  Pt will perform LE dressing with min a to SBA with AE as needed  ONGOING/PROGRESSING  Pt will sit EOB x 15 min with no assistance MET  Pt will transfer bed/chair/bsc with mod a MET and exceeded  Pt will perform standing task x 3 min with cga/sba assistance MET  Pt will tolerate 45 minutes of tx without fatigue ONGOING/PROGRESSING      LT.Restore to max I with self care and mobility.     Outcome: Ongoing, Progressing

## 2023-01-09 NOTE — PROGRESS NOTES
[facility] Hospital Medicine  Progress Note    Patient Name: Karina Rojas  MRN: [id number]  Patient Class: IP- Swing   Admission Date: 1/4/2023  Length of Stay: 5 days  Attending Physician: Jorge Buckley DO  Primary Care Provider: Thalia Bowie NP        Subjective:     Principal Problem:<principal problem not specified>    [facility] Hospital Admission Certification    I certify that skilled nursing facility services are required to be given on an inpatient basis due to the following required skilled nursing and/or skilled rehabilitation services on a continuing basis:    management & evaluation of a patient plan of care that requires skilled nursing or rehabilitation services    I estimate that the additional period of SNF inpatient care will be 1-21 days.    Plans for post SNF care are: Home Care  ______________________________________________________________________        HPI:  Ms Rojas is an 83 y/o female admitted to [facility] for rehabilitation services following right hip fracture secondary to fall requiring Right hip arthroplasty. She initially presented to [facility] ED on 12/28 after trip and fall in home where she was found to have an acute comminuted and minimally displaced right intertrochanteric fracture. She was transferred to [facility] for ortho intervention. She underwent surgical repair of right hip on 12/30. Non-removable surgical dressing to be removed 10 days post op (1/9). Staples to be removed 14 days post op (1/13) and steri strips applied. Ortho follow up in 3 weeks. Prior to fall, she was ambulatory with walker dependence. She is on chronic O2 @ 2L NC with hx of COPD. She will be admitted for PT/OT rehabilitation with hopes to return home when goals are met.     Code Status: FULL CODE    PMH: HTN, Atrial Fibrillation on coumadin, DM, HF, COPD oxygen dependant.  On 2 liters at home      Overview/Hospital Course:  1/9  Follow up today, in bed, c/o back pain which is chronic.  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  Currently on Lovenox/Coumadin bridge.  Had some leg warmth.       Interval History: No major issues.  Following INR.     Review of Systems   Respiratory:  Negative for shortness of breath.    Cardiovascular:  Negative for chest pain.   Gastrointestinal:  Negative for abdominal pain, nausea and vomiting.   Musculoskeletal:  Positive for arthralgias and back pain.   Psychiatric/Behavioral:  Negative for agitation and confusion.    All other systems reviewed and are negative.  Objective:     Vital Signs (Most Recent):  Temp: 97.8 °F (36.6 °C) (01/09/23 0742)  Pulse: 60 (01/09/23 0840)  Resp: 18 (01/09/23 1238)  BP: (!) 135/42 (01/09/23 0742)  SpO2: (!) 92 % (01/09/23 0840)   Vital Signs (24h Range):  Temp:  [97.8 °F (36.6 °C)-98 °F (36.7 °C)] 97.8 °F (36.6 °C)  Pulse:  [59-67] 60  Resp:  [16-18] 18  SpO2:  [92 %-94 %] 92 %  BP: (135-146)/(37-42) 135/42     Weight:  (pt states she's weak and can't stand up)  Body mass index is 24.94 kg/m².    Intake/Output Summary (Last 24 hours) at 1/9/2023 1253  Last data filed at 1/8/2023 1806  Gross per 24 hour   Intake 480 ml   Output --   Net 480 ml      Physical Exam  Vitals reviewed.   Constitutional:       General: She is not in acute distress.     Appearance: Normal appearance.   HENT:      Head: Normocephalic and atraumatic.   Eyes:      General: No scleral icterus.     Extraocular Movements: Extraocular movements intact.      Conjunctiva/sclera: Conjunctivae normal.      Pupils: Pupils are equal, round, and reactive to light.   Cardiovascular:      Rate and Rhythm: Normal rate. Rhythm irregular.      Heart sounds: No murmur heard.    No friction rub. No gallop.   Pulmonary:      Effort: Pulmonary effort is normal. No respiratory distress.      Breath sounds: Normal breath sounds. No wheezing or rales.   Abdominal:      General: Abdomen is flat. Bowel sounds are normal.  There is no distension.      Palpations: Abdomen is soft.      Tenderness: There is no abdominal tenderness. There is no guarding.   Musculoskeletal:         General: No swelling.   Skin:     General: Skin is warm and dry.      Coloration: Skin is not jaundiced.      Findings: No rash.   Neurological:      General: No focal deficit present.      Mental Status: She is alert and oriented to person, place, and time.      Sensory: No sensory deficit.      Motor: No weakness.   Psychiatric:         Mood and Affect: Mood normal.         Thought Content: Thought content normal.         Judgment: Judgment normal.       Significant Labs: All pertinent labs within the past 24 hours have been reviewed.  Recent Lab Results         01/09/23  1039   01/09/23  0525   01/08/23  1956   01/08/23  1652        POC Glucose 396   111   247   162               Significant Imaging: I have reviewed all pertinent imaging results/findings within the past 24 hours.      Assessment/Plan:      Acute cystitis without hematuria    Cefdinir. Follow cx.  >100k GNR    Presence of surgical incision  Dressing to be removed 10 days post-op (1/9), then cleanse daily with hydrogen peroxide, apply Bactroban and cover with 4x4 and secure with paper tape.   Staple removal 14 days post op and apply steri strips   Monitor for signs of infection at site      Diabetes  Last A1c reviewed-   Lab Results   Component Value Date    HGBA1C 7.5 (H) 12/29/2022     Antihyperglycemics (From admission, onward)    Start     Stop Route Frequency Ordered    01/04/23 1417  insulin aspart U-100 injection 0-5 Units         -- SubQ Before meals & nightly PRN 01/04/23 1319        Continue home meds,   Mild SSI with hypoglycemia protocol  Diabetic diet.  Most glucoses acceptable. A few excursions above hospital goal. Monitor.     COPD exacerbation  Continue current med regimen.  Lungs clear, good air movement.   O2 @ 2L NC      Atrial fibrillation  Rate control with amiodarone and  dig.  Stroke ppx with Coumadin.  Coumadin has been resumed post op and INR trending out but not quite therapeutic. (D/c lovenox for DVT ppx when INR 2)      HTN (hypertension)  Continue home meds  Routine VS monitoring, parameters set for provider notification      S/P total right hip arthroplasty  PT eval and treat  OT eval and treat  ST eval and treat  Resp consult  Incision site care as directed by ortho  Staple removal as directed by ortho  Ortho follow up in 3 weeks        VTE Risk Mitigation (From admission, onward)         Ordered     warfarin (COUMADIN) tablet 5 mg  Every Mon, Wed, Fri, Sun         01/06/23 0822     enoxaparin injection 40 mg  Daily         01/04/23 1615     Reason for no Mechanical VTE Prophylaxis  Once        Question:  Reasons:  Answer:  Physician Provided (leave comment)  Comment:  on coumadin    01/04/23 1319     IP VTE HIGH RISK PATIENT  Once         01/04/23 1319                Discharge Planning   CHANTAL:      Code Status: Full Code   Is the patient medically ready for discharge?:     Reason for patient still in hospital (select all that apply): Laboratory test, Treatment and PT / OT recommendations  Discharge Plan A: Home with family, Home Health                  Jorge Buckley DO  Department of Hospital Medicine   [facility]

## 2023-01-09 NOTE — PLAN OF CARE
Problem: Adult Inpatient Plan of Care  Goal: Plan of Care Review  Outcome: Ongoing, Progressing  Goal: Patient-Specific Goal (Individualized)  Outcome: Ongoing, Progressing  Goal: Absence of Hospital-Acquired Illness or Injury  Outcome: Ongoing, Progressing  Goal: Optimal Comfort and Wellbeing  Outcome: Ongoing, Progressing  Goal: Readiness for Transition of Care  Outcome: Ongoing, Progressing     Problem: Gas Exchange Impaired  Goal: Optimal Gas Exchange  Outcome: Ongoing, Progressing     Problem: Fall Injury Risk  Goal: Absence of Fall and Fall-Related Injury  Outcome: Ongoing, Progressing     Problem: Skin Injury Risk Increased  Goal: Skin Health and Integrity  Outcome: Ongoing, Progressing     Problem: Impaired Wound Healing  Goal: Optimal Wound Healing  Outcome: Ongoing, Progressing     Problem: Diabetes Comorbidity  Goal: Blood Glucose Level Within Targeted Range  Outcome: Ongoing, Progressing     Problem: Adjustment to Illness COPD (Chronic Obstructive Pulmonary Disease)  Goal: Optimal Chronic Illness Coping  Outcome: Ongoing, Progressing     Problem: Functional Ability Impaired COPD (Chronic Obstructive Pulmonary Disease)  Goal: Optimal Level of Functional [place]  Outcome: Ongoing, Progressing     Problem: Infection COPD (Chronic Obstructive Pulmonary Disease)  Goal: Absence of Infection Signs and Symptoms  Outcome: Ongoing, Progressing     Problem: Oral Intake Inadequate COPD (Chronic Obstructive Pulmonary Disease)  Goal: Improved Nutrition Intake  Outcome: Ongoing, Progressing     Problem: Respiratory Compromise COPD (Chronic Obstructive Pulmonary Disease)  Goal: Effective Oxygenation and Ventilation  Outcome: Ongoing, Progressing

## 2023-01-09 NOTE — SUBJECTIVE & OBJECTIVE
Interval History: No major issues.  Following INR.     Review of Systems   Respiratory:  Negative for shortness of breath.    Cardiovascular:  Negative for chest pain.   Gastrointestinal:  Negative for abdominal pain, nausea and vomiting.   Musculoskeletal:  Positive for arthralgias and back pain.   Psychiatric/Behavioral:  Negative for agitation and confusion.    All other systems reviewed and are negative.  Objective:     Vital Signs (Most Recent):  Temp: 97.8 °F (36.6 °C) (01/09/23 0742)  Pulse: 60 (01/09/23 0840)  Resp: 18 (01/09/23 1238)  BP: (!) 135/42 (01/09/23 0742)  SpO2: (!) 92 % (01/09/23 0840)   Vital Signs (24h Range):  Temp:  [97.8 °F (36.6 °C)-98 °F (36.7 °C)] 97.8 °F (36.6 °C)  Pulse:  [59-67] 60  Resp:  [16-18] 18  SpO2:  [92 %-94 %] 92 %  BP: (135-146)/(37-42) 135/42     Weight:  (pt states she's weak and can't stand up)  Body mass index is 24.94 kg/m².    Intake/Output Summary (Last 24 hours) at 1/9/2023 1253  Last data filed at 1/8/2023 1806  Gross per 24 hour   Intake 480 ml   Output --   Net 480 ml      Physical Exam  Vitals reviewed.   Constitutional:       General: She is not in acute distress.     Appearance: Normal appearance.   HENT:      Head: Normocephalic and atraumatic.   Eyes:      General: No scleral icterus.     Extraocular Movements: Extraocular movements intact.      Conjunctiva/sclera: Conjunctivae normal.      Pupils: Pupils are equal, round, and reactive to light.   Cardiovascular:      Rate and Rhythm: Normal rate. Rhythm irregular.      Heart sounds: No murmur heard.    No friction rub. No gallop.   Pulmonary:      Effort: Pulmonary effort is normal. No respiratory distress.      Breath sounds: Normal breath sounds. No wheezing or rales.   Abdominal:      General: Abdomen is flat. Bowel sounds are normal. There is no distension.      Palpations: Abdomen is soft.      Tenderness: There is no abdominal tenderness. There is no guarding.   Musculoskeletal:         General: No  swelling.   Skin:     General: Skin is warm and dry.      Coloration: Skin is not jaundiced.      Findings: No rash.   Neurological:      General: No focal deficit present.      Mental Status: She is alert and oriented to person, place, and time.      Sensory: No sensory deficit.      Motor: No weakness.   Psychiatric:         Mood and Affect: Mood normal.         Thought Content: Thought content normal.         Judgment: Judgment normal.       Significant Labs: All pertinent labs within the past 24 hours have been reviewed.  Recent Lab Results         01/09/23  1039   01/09/23  0525   01/08/23  1956   01/08/23  1652        POC Glucose 396   111   247   162               Significant Imaging: I have reviewed all pertinent imaging results/findings within the past 24 hours.

## 2023-01-10 LAB
GLUCOSE SERPL-MCNC: 140 MG/DL (ref 70–105)
GLUCOSE SERPL-MCNC: 169 MG/DL (ref 70–105)
GLUCOSE SERPL-MCNC: 171 MG/DL (ref 70–105)
GLUCOSE SERPL-MCNC: 369 MG/DL (ref 70–105)
INR BLD: 3.07
PROTHROMBIN TIME: 31.5 SECONDS (ref 11.7–14.7)
TSH SERPL DL<=0.005 MIU/L-ACNC: 6.08 UIU/ML (ref 0.36–3.74)

## 2023-01-10 PROCEDURE — 82962 GLUCOSE BLOOD TEST: CPT

## 2023-01-10 PROCEDURE — 97530 THERAPEUTIC ACTIVITIES: CPT | Mod: CQ

## 2023-01-10 PROCEDURE — 25000003 PHARM REV CODE 250: Performed by: NURSE PRACTITIONER

## 2023-01-10 PROCEDURE — 97530 THERAPEUTIC ACTIVITIES: CPT

## 2023-01-10 PROCEDURE — 11000004 HC SNF PRIVATE

## 2023-01-10 PROCEDURE — 97110 THERAPEUTIC EXERCISES: CPT | Mod: CQ

## 2023-01-10 PROCEDURE — 94761 N-INVAS EAR/PLS OXIMETRY MLT: CPT

## 2023-01-10 PROCEDURE — 84443 ASSAY THYROID STIM HORMONE: CPT | Performed by: HOSPITALIST

## 2023-01-10 PROCEDURE — 85610 PROTHROMBIN TIME: CPT | Performed by: NURSE PRACTITIONER

## 2023-01-10 PROCEDURE — 27000221 HC OXYGEN, UP TO 24 HOURS

## 2023-01-10 PROCEDURE — 36415 COLL VENOUS BLD VENIPUNCTURE: CPT | Performed by: NURSE PRACTITIONER

## 2023-01-10 PROCEDURE — 97116 GAIT TRAINING THERAPY: CPT

## 2023-01-10 PROCEDURE — 97110 THERAPEUTIC EXERCISES: CPT

## 2023-01-10 PROCEDURE — 63600175 PHARM REV CODE 636 W HCPCS: Performed by: NURSE PRACTITIONER

## 2023-01-10 RX ADMIN — LEVOTHYROXINE SODIUM 25 MCG: 0.03 TABLET ORAL at 05:01

## 2023-01-10 RX ADMIN — AMIODARONE HYDROCHLORIDE 200 MG: 200 TABLET ORAL at 08:01

## 2023-01-10 RX ADMIN — POLYETHYLENE GLYCOL 3350 17 G: 17 POWDER, FOR SOLUTION ORAL at 08:01

## 2023-01-10 RX ADMIN — ONDANSETRON 4 MG: 4 TABLET, ORALLY DISINTEGRATING ORAL at 08:01

## 2023-01-10 RX ADMIN — Medication 6 MG: at 08:01

## 2023-01-10 RX ADMIN — NITROFURANTOIN (MONOHYDRATE/MACROCRYSTALS) 100 MG: 75; 25 CAPSULE ORAL at 08:01

## 2023-01-10 RX ADMIN — OXYCODONE HYDROCHLORIDE 10 MG: 10 TABLET ORAL at 04:01

## 2023-01-10 RX ADMIN — DIGOXIN 0.12 MG: 125 TABLET ORAL at 08:01

## 2023-01-10 RX ADMIN — ATORVASTATIN CALCIUM 40 MG: 40 TABLET, FILM COATED ORAL at 08:01

## 2023-01-10 RX ADMIN — SENNOSIDES 8.6 MG: 8.6 TABLET, FILM COATED ORAL at 08:01

## 2023-01-10 RX ADMIN — OXYCODONE HYDROCHLORIDE 10 MG: 10 TABLET ORAL at 08:01

## 2023-01-10 RX ADMIN — INSULIN ASPART 5 UNITS: 100 INJECTION, SOLUTION INTRAVENOUS; SUBCUTANEOUS at 10:01

## 2023-01-10 RX ADMIN — OXYCODONE HYDROCHLORIDE 10 MG: 10 TABLET ORAL at 10:01

## 2023-01-10 RX ADMIN — METFORMIN HYDROCHLORIDE 1000 MG: 500 TABLET, FILM COATED ORAL at 08:01

## 2023-01-10 RX ADMIN — FUROSEMIDE 40 MG: 40 TABLET ORAL at 08:01

## 2023-01-10 RX ADMIN — PREDNISONE 5 MG: 5 TABLET ORAL at 08:01

## 2023-01-10 NOTE — NURSING
PT ALERT WHEN RN ENTERED ROOM. PT ASKED FOR SOMETHING FOR HER BACK PAIN. PAIN 8/10-SCALE. ADMINISTERED PRN OXYCODONE 10 MG X 1 PO. WILL REASSESS PAIN.

## 2023-01-10 NOTE — CONSULTS
"[facility] Unit  Adult Nutrition  Consult Note         Reason for Assessment  Reason For Assessment: consult (Assess/educate regarding nutritional needs)  Nutrition Risk Screen: no indicators present    RD consult received and appreciated for new admit.    Recommend continue current diet order as medically appropriate and tolerated. Encourage good PO intakes. Recommend Glucerna oral nutrition supplement TID to promote adequate oral intake    Per MD HPI:  "Ms Rojas is an 83 y/o female admitted to [facility] for rehabilitation services following right hip fracture secondary to fall requiring Right hip arthroplasty. She initially presented to [facility] ED on 12/28 after trip and fall in home where she was found to have an acute comminuted and minimally displaced right intertrochanteric fracture. She was transferred to [facility] for ortho intervention. She underwent surgical repair of right hip on 12/30. Non-removable surgical dressing to be removed 10 days post op (1/9). Staples to be removed 14 days post op (1/13) and steri strips applied. Ortho follow up in 3 weeks. Prior to fall, she was ambulatory with walker dependence. She is on chronic O2 @ 2L NC with hx of COPD. She will be admitted for PT/OT rehabilitation with hopes to return home when goals are met."    Per MD 1/9 note,  "1/9 Follow up today, in bed, c/o back pain which is chronic.  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  Currently on Lovenox/Coumadin bridge.  Had some leg warmth."    Patient currently receiving Diabetic Diet with varied intake 0-100%. PO intake hx:  1/10: 0 + 100%  1/9: 50 + 75%  1/8: 50 + 100%  1/7: 25 + 75%  1/6: 25 + 100%  1/5: 50%  1/4: 25%  Encourage good PO intakes. Recommend Glucerna oral nutrition supplement TID to promote adequate oral intakes. Glucerna provides 220kcal and 10g protein each.     CBW 74kg within IBW range, BMI normal.     Last BM 1/10 per flowsheets. Will continue to monitor PO intakes, " Called patient to review recent results which appear stable as previous.  INR level addressed by INR Clinic.  Patient confirms recent prednisone rxs have been approved by her insurance company.      Coordinator also confirmed patient's next schedule RHC/bx on 11/28 when INR will need to be less than 2.   weight trend, meds, labs, updates in patient condition. RD following.    Malnutrition  Is Patient Malnourished: No    Nutrition Diagnosis  Altered Nutrition Related Lab Values   related to Diabetes Mellitus as evidenced by elevated GLU levels    Nutrition Diagnosis Status: Chronic/ continues    Nutrition Risk  Level of Risk/Frequency of Follow-up: moderate   Chewing or Swallowing Difficulty?: No Chewing or swallowing difficulty    Estimated/Assessed Needs    Temp: 97.6 °F (36.4 °C)Oral  Weight Used For Calorie Calculations: 74 kg (163 lb 2.3 oz)   Energy Need Method: Kcal/kg (25-30 kcal/kg) Energy Calorie Requirements (kcal): 1850-2220kcal  Weight Used For Protein Calculations: 74 kg (163 lb 2.3 oz)  Protein Requirements: 74-89g pro (1.0-1.2g pro/kg)  Estimated Fluid Requirement Method: RDA Method    RDA Method (mL): 1850     Nutrition Prescription / Recommendations  Recommendation/Intervention: Recommend continue current diet order as medically appropriate and tolerated. Encourage good PO intakes. Recommend Glucerna oral nutrition supplement TID to promote adequate oral intake.  Goals: PO intake %, weight maintenance  Nutrition Goal Status: new  Current Diet Order: Diabetic Diet  Nutrition Order Comments: Appropriate  Recommended Diet: Consistent Carbohydrate 2000 (75g Carbs)  Recommended Oral Supplement: Glucerna Shake [220 kcals, 10g Protein, 26g Carbs(4g Fiber, 7g Sugar), 9g Fat] three times daily  Is Nutrition Support Recommended: No  Is Education Recommended: No    Monitor and Evaluation  % current Intake: Other: PO intake 0-100%  % intake to meet estimated needs: 75 - 100 %  Food and Nutrient Intake: energy intake, food and beverage intake  Food and Nutrient Adminstration: diet order  Knowledge/Beliefs/Attitudes: food and nutrition knowledge/skill, beliefs and attitudes  Physical Activity and Function: nutrition-related ADLs and IADLs, factors affecting access to physical activity  Anthropometric  "Measurements: weight, weight change, body mass index  Biochemical Data, Medical Tests and Procedures: electrolyte and renal panel, glucose/endocrine profile, gastrointestinal profile, inflammatory profile, lipid profile  Nutrition-Focused Physical Findings: overall appearance, extremities, muscles and bones, head and eyes, skin     Current Medical Diagnosis and Past Medical History  Diagnosis: other (see comments) (s/p total right hip arthroplasty)  Past Medical History:   Diagnosis Date    COPD (chronic obstructive pulmonary disease)     Diabetes mellitus     DVT (deep venous thrombosis)     High cholesterol     Hypertension     Paroxysmal atrial fibrillation      Nutrition/Diet History  Spiritual, Cultural Beliefs, Jain Practices, Values that Affect Care: no  Food Allergies: NKFA    Lab/Procedures/Meds  No results for input(s): NA, K, BUN, CREATININE, GLU, CALCIUM, ALBUMIN, CL, ALT, AST, PHOS in the last 72 hours.  Last A1c:   Lab Results   Component Value Date    HGBA1C 7.5 (H) 12/29/2022     Lab Results   Component Value Date    RBC 3.67 (L) 01/04/2023    HGB 10.5 (L) 01/04/2023    HCT 34.5 (L) 01/04/2023    MCV 94.0 01/04/2023    MCH 28.6 01/04/2023    MCHC 30.4 (L) 01/04/2023     Pertinent Labs Reviewed: reviewed  Cl 96 (L) - replete to WNL as needed, CO2 39 (H) - unsure of etiology, BUN 19 (H), creatinine 1.08 (H), eGFR 52 (L) - pt with no PMH of CKD, will continue to monitor renal labs,  (H) - pt with PMH of DM  Pertinent Medications Reviewed: reviewed  Amiodarone, atorvastatin, digoxin, furosemide, levothyroxine, metformin, nitrofurantoin, polyethylene glycol, prednisone, warfarin, insulin PRN, ondansetron PRN, oxycodone PRN    Anthropometrics  Temp: 97.6 °F (36.4 °C)  Height: 5' 7.99" (172.7 cm)  Height (inches): 67.99 in  Weight Method: Standard Scale  Weight: 74 kg (163 lb 2.3 oz)  Weight (lb): 163.14 lb  Ideal Body Weight (IBW), Female: 139.95 lb  % Ideal Body Weight, Female (lb): 117.18 " %  BMI (Calculated): 24.8     Nutrition by Nursing  Diet/Nutrition Received: consistent carb/diabetic diet  Intake (%): 0%  Diet/Feeding Assistance: tray set-up  Diet/Feeding Tolerance: no appetite  Last Bowel Movement: 01/10/23    Nutrition Follow-Up  RD Follow-up?: Yes

## 2023-01-10 NOTE — PLAN OF CARE
[facility] - Skilled Nursing Facility  Patient: Karina Rojas     Interdisciplinary Team Meeting     Today's Date: 1/10/2023     Estimated D/C Date: TBD       Primary Physician:  Thalia Bowie    Pharmacy: [facility] Unit Director: Sowmya Valdez   Care Management: Andres Middleton Physical/Occupational Therapy: Charlotte Lizarraga/So Alford   Speech Therapy: Yanni Richard Activity Therapy: Angelica Araujo    Dietician: Jaci Sanabria  Pharmacist: Mike Nunez  Respiratory therapist: Tere Adame     Nurse  New Symptoms/Problems: n/a  Last BM: 1/10/23 Urine: continent Diarrhea: No   Constipated: No Bowel: continent Davidson: no   Isolation: No     O2: room air     Nutrition: diabetic diet  Speech/Swallowing: not following  Aspiration Precautions: No  Cognition: alert and oriented to person place time and situation    Physical Therapy  Gait: not assessed ELOS: TBD   Transfers: not performed Range of Motion/Restrictions: limited due to recent R hip surgery and pain in back     Occupational Therapy   Eating/Grooming: independent   Toileting: min/contact guard Bathing: min a overall   Dressing (Upper Body): independent Dressing (Lower Body): mod a       Tx Plan/Recommendations reviewed with family and patient on (date) 1/10 by phone.  Additional family Conference/Training: none  D/C Plan/Recommendations: home with home health    Pharmacy  Medication Changes (see MD orders in chart): No  MD/NP: Jorge Buckley Labs Reviewed: 1/10 New Lab Orders: none. Continue to monitor PT/INR     MD/NP Signature: Time:

## 2023-01-10 NOTE — PT/OT/SLP PROGRESS
Physical Therapy Treatment    Patient Name:  Karina Rojas   MRN:  [id number]    Recommendations:     Discharge Recommendations: home with home health  Discharge Equipment Recommendations: none  Barriers to discharge: Decreased caregiver support    Assessment:     Karina Rojas is a 82 y.o. female admitted with a medical diagnosis of <principal problem not specified>.  She presents with the following impairments/functional limitations: weakness, impaired endurance, impaired self care skills, impaired functional mobility, gait instability, impaired balance, decreased lower extremity function, pain, impaired skin, impaired cardiopulmonary response to activity. Patient required occasional rest breaks throughout treatment due to fatigue, back pain, and for nursing care. Patient requested to get back in bed after completion of exercises. Patient had no reports of adverse effects after completion of treatment.     Rehab Prognosis: Fair; patient would benefit from acute skilled PT services to address these deficits and reach maximum level of function.    Recent Surgery: * No surgery found *        SUPERVISORY NOTE with KRISTOPHER Ann PTA to inform her that Dr. Buckley has cleared pt to fully participate with therapy.--TIAGO Lizarraga, PT  Plan:     During this hospitalization, patient to be seen BID to address the identified rehab impairments via gait training, therapeutic activities, therapeutic exercises and progress toward the following goals:    Plan of Care Expires:  02/03/23    Subjective     Chief Complaint: Patient stated that she had pain in her back, and also that she had pain while moving her calf. Patient also stated that she was feeling nauseas earlier. Patient's R calf also felt warm to the touch.   Patient/Family Comments/goals: DC home with home health when able.  Pain/Comfort:  Pain Rating 1: 8/10  Location - Side 1: Bilateral  Location - Orientation 1: generalized  Location 1: back  Pain Addressed 1:  Reposition, Other (see comments) (Nurse administered pain medication during treatment.)      Objective:     Communicated with pt prior to session. Patient found up in chair with oxygen upon PTA entry to room.     General Precautions: Standard, diabetic, fall  Orthopedic Precautions: RLE weight bearing as tolerated  Braces: N/A  Respiratory Status: Nasal cannula, flow 2 L/min     Functional Mobility:  Bed Mobility:     Scooting: supervision  Sit to Supine: stand by assistance  Transfers:     Sit to Stand:  minimum assistance and of 1 persons with rolling walker  Bed to Chair: contact guard assistance and (Chair to bed) with  rolling walker  using  Step Transfer  Gait: 5 feet in room to transfer from chair to bed with CGA/SBA and RW  Balance: Static standing balance with CGA for approx 30 seconds x 2    AM-PAC 6 CLICK MOBILITY  Turning over in bed (including adjusting bedclothes, sheets and blankets)?: 3  Sitting down on and standing up from a chair with arms (e.g., wheelchair, bedside commode, etc.): 2  Moving from lying on back to sitting on the side of the bed?: 2  Moving to and from a bed to a chair (including a wheelchair)?: 2  Need to walk in hospital room?: 2  Climbing 3-5 steps with a railing?: 1  Basic Mobility Total Score: 12       Treatment & Education:  Patient performed the following seated therapeutic exercises on BLE 2 x 10: Marching, LAQs, glute sets, Hip ADD  Sit to stand 3x  Static standing balance approx. 30 seconds x 2    Patient left HOB elevated with BLEs elevated on pillows with call button in reach.    GOALS:   Multidisciplinary Problems       Physical Therapy Goals          Problem: Physical Therapy    Goal Priority Disciplines Outcome Goal Variances Interventions   Physical Therapy Goal     PT, PT/OT Ongoing, Progressing     Description: STG - 2 weeks  1. Pt will perform sit to/from stand and bed transfer with min A x 1.  2. Pt will transfer supine to/from sit with min A x 1.  3. Pt will amb  with RW/O2 25 ft with min A x 1.      LTG - 4 weeks  1. Pt will perform sit to/from stand and bed transfer with SBA  2. Pt will transfer supine to/from sit with SBA.  3. Pt will amb with RW/O2 25 ft with SBA.                           Time Tracking:     PT Received On: 01/10/23  PT Start Time: 1045     PT Stop Time: 1109  PT Total Time (min): 24 min     Billable Minutes: Therapeutic Activity 8 and Therapeutic Exercise 16    Treatment Type: Treatment  PT/PTA: PTA     PTA Visit Number: 5     01/10/2023

## 2023-01-10 NOTE — PLAN OF CARE
Problem: Physical Therapy  Goal: Physical Therapy Goal  Description: STG - 2 weeks  1. Pt will perform sit to/from stand and bed transfer with min A x 1.-MET  2. Pt will transfer supine to/from sit with min A x 1.-PROGRESSING  3. Pt will amb with RW/O2 25 ft with min A x 1.-PROGRESSING      LTG - 4 weeks  1. Pt will perform sit to/from stand and bed transfer with SBA  2. Pt will transfer supine to/from sit with SBA.  3. Pt will amb with RW/O2 25 ft with SBA.      Outcome: Ongoing, Progressing

## 2023-01-10 NOTE — PT/OT/SLP PROGRESS
Occupational Therapy   Treatment    Name: Karina Rojas  MRN: [id number]  Admitting Diagnosis:  <principal problem not specified>       Recommendations:     Discharge Recommendations:    Discharge Equipment Recommendations:  hip kit  Barriers to discharge:  None    Assessment:     Karina Rojas is a 82 y.o. female with a medical diagnosis of R SHYANNE.  She presents with eagerness to participate in therapy despite some chronic back pain. Performance deficits affecting function are weakness, impaired endurance, impaired self care skills, impaired functional mobility, gait instability, impaired balance, decreased lower extremity function, pain, decreased ROM, impaired skin, impaired cardiopulmonary response to activity, orthopedic precautions.     Rehab Prognosis:  Good; patient would benefit from acute skilled OT services to address these deficits and reach maximum level of function.       Plan:     Patient to be seen 5 x/week to address the above listed problems via self-care/home management, therapeutic activities, therapeutic exercises, wheelchair management/training  Plan of Care Expires: 02/10/23  Plan of Care Reviewed with: patient, caregiver, daughter    Subjective     Pain/Comfort:       Objective:     Communicated with: pt prior to session.  Patient found HOB elevated with oxygen (pt uses oxygen at home constantly, unable to even be transported in hallway for 1 to 2 minutes without supp ox.) upon OT entry to room.    General Precautions: Standard,      Orthopedic Precautions:   Braces: N/A  Respiratory Status: Nasal cannula, flow 2 L/min     Occupational Performance:     Bed Mobility:    Patient completed Rolling/Turning to Left with  minimum assistance  Patient completed Rolling/Turning to Right with minimum assistance  Patient completed Scooting/Bridging with minimum assistance  Patient completed Supine to Sit with minimum assistance  Patient completed Sit to Supine with minimum assistance     Functional  "Mobility/Transfers:  Patient completed Sit <> Stand Transfer with contact guard assistance  with  rolling walker   Functional Mobility: na today    Activities of Daily Living:  Toileting: minimum assistance discussed with pt once again today regarding bowel and bladder training program.  Pt states, "I have been to the BSC a few times today, so that is some progress, but just now, I had some leakage."  At end of session, OT assisted pt in changing wet brief, washing around periarea, drying, and replacing with dry brief.  Pt states, "I'm going to get my daughter to bring me those pullups from home; I think that will be better."  OT encouraged her to upgrade to pullup and cont with bowel/bladder training (going to [facility] every 2 hours or less).  Pt relates good understanding.      [facility] 6 Click ADL:      Treatment & Education:  OT engaged pt in bedside UE exercises as well as some standing marches and kicks to promote overall endurance, mobility, blood flow, and stamina.  Pt relates she would rather not leave out of room, as OT had encouraged her that it was time for her to come to the therapy dept to use some of the exercise equipment to progress more quickly.  Pt states, "oh, I'd rather stay in here.  If I can get better just as fast, I'd rather stay in this room."        Patient left HOB elevated with call button in reach and BLE positioned for heel protection    GOALS:   Multidisciplinary Problems       Occupational Therapy Goals          Problem: Occupational Therapy    Goal Priority Disciplines Outcome Interventions   Occupational Therapy Goal     OT, PT/OT Ongoing, Progressing    Description: STG: within 2 weeks  Pt will perform grooming with setup at sinkside MET  Pt will bathe with min a at bedside MET  Pt will perform UE dressing with SVN bedside MET  Pt will perform LE dressing with min a to SBA with AE as needed  ONGOING/PROGRESSING  Pt will sit EOB x 15 min with no assistance MET  Pt will transfer " bed/chair/bsc with mod a MET and exceeded  Pt will perform standing task x 3 min with cga/sba assistance MET  Pt will tolerate 45 minutes of tx without fatigue ONGOING/PROGRESSING      LT.Restore to max I with self care and mobility.                          Time Tracking:     OT Date of Treatment: 01/10/23  OT Start Time: 1400  OT Stop Time: 1427  OT Total Time (min): 27 min    Billable Minutes:Therapeutic Activity 8  Therapeutic Exercise 15 self care, 4 min    OT/HELENA: OT          1/10/2023

## 2023-01-10 NOTE — PT/OT/SLP PROGRESS
Physical Therapy Treatment    Patient Name:  Karina Rojas   MRN:  [id number]    Recommendations:     Discharge Recommendations: home with home health  Discharge Equipment Recommendations: none  Barriers to discharge: Decreased caregiver support    Assessment:     Karina Rojas is a 82 y.o. female admitted with a medical diagnosis of <principal problem not specified>.  She presents with the following impairments/functional limitations: weakness, impaired endurance, gait instability, impaired balance, pain, impaired cardiopulmonary response to activity, orthopedic precautions . Pt gradually progressing with amb'n.    Rehab Prognosis: Good; patient would benefit from acute skilled PT services to address these deficits and reach maximum level of function.    Recent Surgery: * No surgery found *      Plan:     During this hospitalization, patient to be seen BID to address the identified rehab impairments via gait training, therapeutic activities, therapeutic exercises and progress toward the following goals:    Plan of Care Expires:  02/03/23    Subjective     Chief Complaint: Pt states she's willing to come to PT Dept and walk in hosp  halls once she gets pull up diapers that won't fall down to her ankles. Pt only c/o back pain and very minimal right hip pain. No c/o right calf pain this afternoon.  Patient/Family Comments/goals: Pt to dc home with home health upon dc from hosp.  Pain/Comfort:  Pain Rating 1: 7/10  Location - Side 1: Bilateral  Location - Orientation 1: midline  Location 1: back  Pain Addressed 1: Pre-medicate for activity  Pain Rating Post-Intervention 1: 5/10  Pain Rating 2: 1/10  Location - Side 2: Right  Location - Orientation 2: lower  Location 2: hip  Pain Addressed 2: Pre-medicate for activity  Pain Rating Post-Intervention 2: 1/10      Objective:     Communicated with pt and son prior to session. PT spoke with Dr. Buckley this morning and he cleared pt to stand, walk and exercise with PT  "and OT. Patient found sitting edge of bed with oxygen upon PT entry to room.     General Precautions: Standard, fall, respiratory  Orthopedic Precautions: RLE weight bearing as tolerated, spinal precautions  Braces: N/A  Respiratory Status: Nasal cannula, flow 2 L/min     Functional Mobility:  Transfers:     Sit to Stand:  minimum assistance with rolling walker  Gait: Pt amb'd 10 ft, then 15 ft using RW/O2 with min Ax1. Pt had only minimal SOB after each walk. Pt has flexed posture and unsteady with negotiating turns.      AM-PAC 6 CLICK MOBILITY          Treatment & Education:  Seated ther ex: LAQ 3" x 10 reps, hip flex x 10, GS x 20, resisted hip add x 20 using pillow, hip abd x 10. Pt amb'd 10 ft, then 15 ft as stated above.    Patient left sitting edge of bed with all lines intact and son present..    GOALS:   Multidisciplinary Problems       Physical Therapy Goals          Problem: Physical Therapy    Goal Priority Disciplines Outcome Goal Variances Interventions   Physical Therapy Goal     PT, PT/OT Ongoing, Progressing     Description: STG - 2 weeks  1. Pt will perform sit to/from stand and bed transfer with min A x 1.  2. Pt will transfer supine to/from sit with min A x 1.  3. Pt will amb with RW/O2 25 ft with min A x 1.      LTG - 4 weeks  1. Pt will perform sit to/from stand and bed transfer with SBA  2. Pt will transfer supine to/from sit with SBA.  3. Pt will amb with RW/O2 25 ft with SBA.                           Time Tracking:     PT Received On: 01/10/23  PT Start Time: 1523     PT Stop Time: 1536  PT Total Time (min): 13 min     Billable Minutes: Gait Training 8, Therapeutic Exercise 5, and Total Time 13 min    Treatment Type: Treatment  PT/PTA: PT     PTA Visit Number: 0     01/10/2023  "

## 2023-01-11 LAB
ANION GAP SERPL CALCULATED.3IONS-SCNC: 5 MMOL/L (ref 7–16)
BASOPHILS # BLD AUTO: 0.03 K/UL (ref 0–0.2)
BASOPHILS NFR BLD AUTO: 0.3 % (ref 0–1)
BUN SERPL-MCNC: 15 MG/DL (ref 7–18)
BUN/CREAT SERPL: 15 (ref 6–20)
CALCIUM SERPL-MCNC: 8.5 MG/DL (ref 8.5–10.1)
CHLORIDE SERPL-SCNC: 95 MMOL/L (ref 98–107)
CO2 SERPL-SCNC: 40 MMOL/L (ref 21–32)
CREAT SERPL-MCNC: 1.02 MG/DL (ref 0.55–1.02)
DIFFERENTIAL METHOD BLD: ABNORMAL
EGFR (NO RACE VARIABLE) (RUSH/TITUS): 56 ML/MIN/1.73M²
EOSINOPHIL # BLD AUTO: 0.2 K/UL (ref 0–0.5)
EOSINOPHIL NFR BLD AUTO: 2.1 % (ref 1–4)
ERYTHROCYTE [DISTWIDTH] IN BLOOD BY AUTOMATED COUNT: 17.7 % (ref 11.5–14.5)
GLUCOSE SERPL-MCNC: 118 MG/DL (ref 70–105)
GLUCOSE SERPL-MCNC: 119 MG/DL (ref 74–106)
GLUCOSE SERPL-MCNC: 188 MG/DL (ref 70–105)
GLUCOSE SERPL-MCNC: 208 MG/DL (ref 70–105)
GLUCOSE SERPL-MCNC: 229 MG/DL (ref 70–105)
HCT VFR BLD AUTO: 35.4 % (ref 38–47)
HGB BLD-MCNC: 10.5 G/DL (ref 12–16)
INR BLD: 3.38
LYMPHOCYTES # BLD AUTO: 1.62 K/UL (ref 1–4.8)
LYMPHOCYTES NFR BLD AUTO: 16.7 % (ref 27–41)
MCH RBC QN AUTO: 28.7 PG (ref 27–31)
MCHC RBC AUTO-ENTMCNC: 29.7 G/DL (ref 32–36)
MCV RBC AUTO: 96.7 FL (ref 80–96)
MONOCYTES # BLD AUTO: 0.7 K/UL (ref 0–0.8)
MONOCYTES NFR BLD AUTO: 7.2 % (ref 2–6)
MPC BLD CALC-MCNC: 9.8 FL (ref 9.4–12.4)
NEUTROPHILS # BLD AUTO: 7.14 K/UL (ref 1.8–7.7)
NEUTROPHILS NFR BLD AUTO: 73.7 % (ref 53–65)
PLATELET # BLD AUTO: 436 K/UL (ref 150–400)
POTASSIUM SERPL-SCNC: 4.3 MMOL/L (ref 3.5–5.1)
PROTHROMBIN TIME: 33.9 SECONDS (ref 11.7–14.7)
RBC # BLD AUTO: 3.66 M/UL (ref 4.2–5.4)
SODIUM SERPL-SCNC: 136 MMOL/L (ref 136–145)
WBC # BLD AUTO: 9.69 K/UL (ref 4.5–11)

## 2023-01-11 PROCEDURE — 63600175 PHARM REV CODE 636 W HCPCS: Performed by: NURSE PRACTITIONER

## 2023-01-11 PROCEDURE — 82962 GLUCOSE BLOOD TEST: CPT

## 2023-01-11 PROCEDURE — 99900035 HC TECH TIME PER 15 MIN (STAT)

## 2023-01-11 PROCEDURE — 97116 GAIT TRAINING THERAPY: CPT

## 2023-01-11 PROCEDURE — 94761 N-INVAS EAR/PLS OXIMETRY MLT: CPT

## 2023-01-11 PROCEDURE — 25000003 PHARM REV CODE 250: Performed by: NURSE PRACTITIONER

## 2023-01-11 PROCEDURE — 97110 THERAPEUTIC EXERCISES: CPT | Mod: CQ

## 2023-01-11 PROCEDURE — 85610 PROTHROMBIN TIME: CPT | Performed by: NURSE PRACTITIONER

## 2023-01-11 PROCEDURE — 27000221 HC OXYGEN, UP TO 24 HOURS

## 2023-01-11 PROCEDURE — 11000004 HC SNF PRIVATE

## 2023-01-11 PROCEDURE — 25000003 PHARM REV CODE 250: Performed by: HOSPITALIST

## 2023-01-11 PROCEDURE — 85025 COMPLETE CBC W/AUTO DIFF WBC: CPT | Performed by: NURSE PRACTITIONER

## 2023-01-11 PROCEDURE — 36415 COLL VENOUS BLD VENIPUNCTURE: CPT | Performed by: NURSE PRACTITIONER

## 2023-01-11 PROCEDURE — 97530 THERAPEUTIC ACTIVITIES: CPT | Mod: CQ

## 2023-01-11 PROCEDURE — 80048 BASIC METABOLIC PNL TOTAL CA: CPT | Performed by: NURSE PRACTITIONER

## 2023-01-11 RX ORDER — WARFARIN 1 MG/1
4 TABLET ORAL
Status: DISCONTINUED | OUTPATIENT
Start: 2023-01-11 | End: 2023-01-17 | Stop reason: HOSPADM

## 2023-01-11 RX ADMIN — AMIODARONE HYDROCHLORIDE 200 MG: 200 TABLET ORAL at 08:01

## 2023-01-11 RX ADMIN — OXYCODONE HYDROCHLORIDE 10 MG: 10 TABLET ORAL at 04:01

## 2023-01-11 RX ADMIN — LEVOTHYROXINE SODIUM 25 MCG: 0.03 TABLET ORAL at 05:01

## 2023-01-11 RX ADMIN — NITROFURANTOIN (MONOHYDRATE/MACROCRYSTALS) 100 MG: 75; 25 CAPSULE ORAL at 08:01

## 2023-01-11 RX ADMIN — ATORVASTATIN CALCIUM 40 MG: 40 TABLET, FILM COATED ORAL at 08:01

## 2023-01-11 RX ADMIN — FUROSEMIDE 40 MG: 40 TABLET ORAL at 08:01

## 2023-01-11 RX ADMIN — SENNOSIDES 8.6 MG: 8.6 TABLET, FILM COATED ORAL at 08:01

## 2023-01-11 RX ADMIN — DIGOXIN 0.12 MG: 125 TABLET ORAL at 08:01

## 2023-01-11 RX ADMIN — OXYCODONE HYDROCHLORIDE 10 MG: 10 TABLET ORAL at 03:01

## 2023-01-11 RX ADMIN — POLYETHYLENE GLYCOL 3350 17 G: 17 POWDER, FOR SOLUTION ORAL at 08:01

## 2023-01-11 RX ADMIN — OXYCODONE HYDROCHLORIDE 10 MG: 10 TABLET ORAL at 09:01

## 2023-01-11 RX ADMIN — WARFARIN SODIUM 4 MG: 1 TABLET ORAL at 04:01

## 2023-01-11 RX ADMIN — PREDNISONE 5 MG: 5 TABLET ORAL at 08:01

## 2023-01-11 RX ADMIN — Medication 6 MG: at 08:01

## 2023-01-11 RX ADMIN — METFORMIN HYDROCHLORIDE 1000 MG: 500 TABLET, FILM COATED ORAL at 08:01

## 2023-01-11 RX ADMIN — OXYCODONE HYDROCHLORIDE 10 MG: 10 TABLET ORAL at 08:01

## 2023-01-11 NOTE — PLAN OF CARE
Problem: Gas Exchange Impaired  Goal: Optimal Gas Exchange  Outcome: Ongoing, Progressing     Problem: Adjustment to Illness COPD (Chronic Obstructive Pulmonary Disease)  Goal: Optimal Chronic Illness Coping  Outcome: Ongoing, Progressing     Problem: Functional Ability Impaired COPD (Chronic Obstructive Pulmonary Disease)  Goal: Optimal Level of Functional [place]  Outcome: Ongoing, Progressing     Problem: Infection COPD (Chronic Obstructive Pulmonary Disease)  Goal: Absence of Infection Signs and Symptoms  Outcome: Ongoing, Progressing     Problem: Oral Intake Inadequate COPD (Chronic Obstructive Pulmonary Disease)  Goal: Improved Nutrition Intake  Outcome: Ongoing, Progressing     Problem: Respiratory Compromise COPD (Chronic Obstructive Pulmonary Disease)  Goal: Effective Oxygenation and Ventilation  Outcome: Ongoing, Progressing

## 2023-01-11 NOTE — PT/OT/SLP PROGRESS
"Occupational Therapy      Patient Name:  Karina Rojas   MRN:  [id number]    Patient not seen today secondary to Patient fatigue, Patient unwilling to participate (pt states, "I don't believe I want to get up now, honey; I've gotten tired."). Will follow-up tomorrow.    1/11/2023  "

## 2023-01-11 NOTE — NURSING
Nurses Note -- 4 Eyes      1/10/2023   7:14 PM      Skin assessed during: Q Shift Change      [] No Pressure Injuries Present    []Prevention Measures Documented      [] Yes- Altered Skin Integrity Present or Discovered   [] LDA Added if Not in Epic (Describe Wound)   [x] New Altered Skin Integrity was Present on Admit and Documented in LDA   [] Wound Image Taken    Wound Care Consulted? No    Attending Nurse:  Teofilo Lizarraga LPN     Second RN/Staff Member:  Kaleigh Woods RN

## 2023-01-11 NOTE — PT/OT/SLP PROGRESS
Physical Therapy Treatment    Patient Name:  Karina Rojas   MRN:  [id number]    Recommendations:     Discharge Recommendations: home with home health  Discharge Equipment Recommendations: none  Barriers to discharge: None    Assessment:     Karina Rojas is a 82 y.o. female admitted with a medical diagnosis of <principal problem not specified>.  She presents with the following impairments/functional limitations: gait instability, impaired balance, weakness, impaired endurance, pain, orthopedic precautions . Pt cont's to improve with amb'n.    Rehab Prognosis: Good; patient would benefit from acute skilled PT services to address these deficits and reach maximum level of function.    Recent Surgery: * No surgery found *      Plan:     During this hospitalization, patient to be seen 5 x/week to address the identified rehab impairments via gait training, therapeutic activities, therapeutic exercises and progress toward the following goals:    Plan of Care Expires:  02/03/23    Subjective     Chief Complaint: Pt is asking when she can dc home. Pt instructed to talk with her family and MD.  Patient/Family Comments/goals: Pt to dc home when able with family and home health.  Pain/Comfort:  Pain Rating 1: 2/10  Location - Side 1: Right  Location - Orientation 1: lower  Location 1: calf  Pain Addressed 1: Pre-medicate for activity  Pain Rating Post-Intervention 1: 1/10      Objective:     Communicated with nsg prior to session.  Patient found HOB elevated with oxygen upon PT entry to room.     General Precautions: Standard, fall, respiratory  Orthopedic Precautions: RLE weight bearing as tolerated, spinal precautions  Braces: N/A  Respiratory Status: Nasal cannula, flow 2 L/min     Functional Mobility:  Bed Mobility:     Rolling Right: modified independence  Scooting: independence  Supine to Sit: supervision  Transfers:     Sit to Stand:  minimum assistance and from wc or SBA from bed with rolling walker  Bed to Chair:  contact guard assistance with  rolling walker  using  Stand Pivot  Toilet Transfer: contact guard assistance and BSC with  rolling walker  using  Stand Pivot  Gait: Pt amb'd x 30 ft with SBA/CGA using RW/O2 with wc trail.      AM-PAC 6 CLICK MOBILITY  Turning over in bed (including adjusting bedclothes, sheets and blankets)?: 4  Sitting down on and standing up from a chair with arms (e.g., wheelchair, bedside commode, etc.): 3  Moving from lying on back to sitting on the side of the bed?: 4  Moving to and from a bed to a chair (including a wheelchair)?: 3  Need to walk in hospital room?: 3  Climbing 3-5 steps with a railing?: 1  Basic Mobility Total Score: 18       Treatment & Education:  Nu-Step at L1 x 12 min using BUE/BLE. Pt amb'd in PT dept x 30 ft as stated above. Upon return to her room, pt was transferred to the BSC with nsg notified.    Patient left  on BSC with O2  with call button in reach and nsg notified..    GOALS:   Multidisciplinary Problems       Physical Therapy Goals          Problem: Physical Therapy    Goal Priority Disciplines Outcome Goal Variances Interventions   Physical Therapy Goal     PT, PT/OT Ongoing, Progressing     Description: STG - 2 weeks  1. Pt will perform sit to/from stand and bed transfer with min A x 1.-MET  2. Pt will transfer supine to/from sit with min A x 1.-PROGRESSING  3. Pt will amb with RW/O2 25 ft with min A x 1.-PROGRESSING      LTG - 4 weeks  1. Pt will perform sit to/from stand and bed transfer with SBA  2. Pt will transfer supine to/from sit with SBA.  3. Pt will amb with RW/O2 25 ft with SBA.                           Time Tracking:     PT Received On: 01/11/23  PT Start Time: 1414     PT Stop Time: 1445  PT Total Time (min): 31 min     Billable Minutes: Gait Training 8, Therapeutic Activity 7, Therapeutic Exercise 16, and Total Time 31 min    Treatment Type: Treatment  PT/PTA: PT     PTA Visit Number: 0     01/11/2023

## 2023-01-11 NOTE — PROGRESS NOTES
Clinical Pharmacy Chart Review Note      Admit Date: 1/4/2023   LOS: 7 days       Karina Rojas is a 82 y.o. female admitted to SNF for PT/OT after hospitalization for right hip fracture.    Active Hospital Problems    Diagnosis  POA    Acute cystitis without hematuria [N30.00]  Yes    S/P total right hip arthroplasty [Z96.641]  Not Applicable    HTN (hypertension) [I10]  Yes    Atrial fibrillation [I48.91]  Yes    COPD exacerbation [J44.1]  Yes    Diabetes [E11.9]  Yes    Presence of surgical incision [Z78.9]  Yes      Resolved Hospital Problems   No resolved problems to display.     Review of patient's allergies indicates:   Allergen Reactions    Tylenol [acetaminophen] Anaphylaxis     Patient Active Problem List    Diagnosis Date Noted    Acute cystitis without hematuria 01/06/2023    S/P total right hip arthroplasty 01/04/2023    HTN (hypertension) 01/04/2023    Atrial fibrillation 01/04/2023    COPD exacerbation 01/04/2023    Diabetes 01/04/2023    Presence of surgical incision 01/04/2023       Scheduled Meds:    amiodarone  200 mg Oral BID    atorvastatin  40 mg Oral QHS    digoxin  0.125 mg Oral Daily    furosemide  40 mg Oral Daily    levothyroxine  25 mcg Oral Before breakfast    metFORMIN  1,000 mg Oral Daily with breakfast    nitrofurantoin (macrocrystal-monohydrate)  100 mg Oral Q12H    polyethylene glycol  17 g Oral Daily    predniSONE  5 mg Oral Daily    warfarin  4 mg Oral Every Mon, Wed, Fri, Sun     Continuous Infusions:   PRN Meds: albuterol sulfate, aluminum-magnesium hydroxide-simethicone, dextrose 50%, dextrose 50%, glucagon (human recombinant), glucose, glucose, insulin aspart U-100, magnesium hydroxide 400 mg/5 ml, melatonin, ondansetron, oxyCODONE, senna    OBJECTIVE:     Vital Signs (Last 24H)  Temp:  [97.9 °F (36.6 °C)-98.1 °F (36.7 °C)]   Pulse:  [61-66]   Resp:  [18-20]   BP: (112-154)/(48-51)   SpO2:  [97 %-99 %]     Laboratory:  CBC:   Recent Labs   Lab 01/04/23  1405 01/11/23  0546    WBC 8.36 9.69   RBC 3.67* 3.66*   HGB 10.5* 10.5*   HCT 34.5* 35.4*    436*   MCV 94.0 96.7*   MCH 28.6 28.7   MCHC 30.4* 29.7*     BMP:   Recent Labs   Lab 01/04/23  1405 01/11/23  0546   * 119*    136   K 4.2 4.3   CL 96* 95*   CO2 39* 40*   BUN 19* 15   CREATININE 1.08* 1.02   CALCIUM 9.0 8.5     .    ASSESSMENT/PLAN:     Estimated Creatinine Clearance: 44.4 mL/min (based on SCr of 1.02 mg/dL).Medications reviewed, no dose adjustments needed. TSH 6.079, will recommend dose adjustment. INR 3.38, warfarin reduced to 4mg [facility].  K+ wnl. Weekly swing bed medication regimen review complete.  Will continue to monitor.  Mike Nunez, Pharm. D.  Director of Pharmacy  [facility]  [phone]  [email or web address]

## 2023-01-11 NOTE — PLAN OF CARE
Problem: Adult Inpatient Plan of Care  Goal: Absence of Hospital-Acquired Illness or Injury  Outcome: Ongoing, Progressing     Problem: Fall Injury Risk  Goal: Absence of Fall and Fall-Related Injury  Outcome: Ongoing, Progressing     Problem: Impaired Wound Healing  Goal: Optimal Wound Healing  Outcome: Ongoing, Progressing

## 2023-01-11 NOTE — PLAN OF CARE
Problem: Gas Exchange Impaired  Goal: Optimal Gas Exchange  Outcome: Ongoing, Progressing     Problem: Adjustment to Illness COPD (Chronic Obstructive Pulmonary Disease)  Goal: Optimal Chronic Illness Coping  Outcome: Ongoing, Progressing     Problem: Functional Ability Impaired COPD (Chronic Obstructive Pulmonary Disease)  Goal: Optimal Level of Functional [place]  Outcome: Ongoing, Progressing     Problem: Infection COPD (Chronic Obstructive Pulmonary Disease)  Goal: Absence of Infection Signs and Symptoms  Outcome: Ongoing, Progressing     Problem: Oral Intake Inadequate COPD (Chronic Obstructive Pulmonary Disease)  Goal: Improved Nutrition Intake  Outcome: Ongoing, Progressing     Problem: Respiratory Compromise COPD (Chronic Obstructive Pulmonary Disease)  Goal: Effective Oxygenation and Ventilation  Outcome: Ongoing, Progressing

## 2023-01-12 LAB
GLUCOSE SERPL-MCNC: 180 MG/DL (ref 70–105)
GLUCOSE SERPL-MCNC: 199 MG/DL (ref 70–105)
GLUCOSE SERPL-MCNC: 217 MG/DL (ref 70–105)
GLUCOSE SERPL-MCNC: 233 MG/DL (ref 70–105)
INR BLD: 3.51
PROTHROMBIN TIME: 34.9 SECONDS (ref 11.7–14.7)

## 2023-01-12 PROCEDURE — 94761 N-INVAS EAR/PLS OXIMETRY MLT: CPT

## 2023-01-12 PROCEDURE — 27000221 HC OXYGEN, UP TO 24 HOURS

## 2023-01-12 PROCEDURE — 97530 THERAPEUTIC ACTIVITIES: CPT | Mod: CQ

## 2023-01-12 PROCEDURE — 25000003 PHARM REV CODE 250: Performed by: NURSE PRACTITIONER

## 2023-01-12 PROCEDURE — 97116 GAIT TRAINING THERAPY: CPT | Mod: CQ

## 2023-01-12 PROCEDURE — 97110 THERAPEUTIC EXERCISES: CPT | Mod: CQ

## 2023-01-12 PROCEDURE — 85610 PROTHROMBIN TIME: CPT | Performed by: NURSE PRACTITIONER

## 2023-01-12 PROCEDURE — 63600175 PHARM REV CODE 636 W HCPCS: Performed by: NURSE PRACTITIONER

## 2023-01-12 PROCEDURE — 97110 THERAPEUTIC EXERCISES: CPT

## 2023-01-12 PROCEDURE — 97535 SELF CARE MNGMENT TRAINING: CPT

## 2023-01-12 PROCEDURE — 36415 COLL VENOUS BLD VENIPUNCTURE: CPT | Performed by: NURSE PRACTITIONER

## 2023-01-12 PROCEDURE — 82962 GLUCOSE BLOOD TEST: CPT

## 2023-01-12 PROCEDURE — 97530 THERAPEUTIC ACTIVITIES: CPT

## 2023-01-12 PROCEDURE — 11000004 HC SNF PRIVATE

## 2023-01-12 PROCEDURE — 99900035 HC TECH TIME PER 15 MIN (STAT)

## 2023-01-12 RX ADMIN — NITROFURANTOIN (MONOHYDRATE/MACROCRYSTALS) 100 MG: 75; 25 CAPSULE ORAL at 08:01

## 2023-01-12 RX ADMIN — DIGOXIN 0.12 MG: 125 TABLET ORAL at 08:01

## 2023-01-12 RX ADMIN — INSULIN ASPART 1 UNITS: 100 INJECTION, SOLUTION INTRAVENOUS; SUBCUTANEOUS at 08:01

## 2023-01-12 RX ADMIN — OXYCODONE HYDROCHLORIDE 10 MG: 10 TABLET ORAL at 02:01

## 2023-01-12 RX ADMIN — LEVOTHYROXINE SODIUM 25 MCG: 0.03 TABLET ORAL at 05:01

## 2023-01-12 RX ADMIN — OXYCODONE HYDROCHLORIDE 10 MG: 10 TABLET ORAL at 08:01

## 2023-01-12 RX ADMIN — POLYETHYLENE GLYCOL 3350 17 G: 17 POWDER, FOR SOLUTION ORAL at 08:01

## 2023-01-12 RX ADMIN — AMIODARONE HYDROCHLORIDE 200 MG: 200 TABLET ORAL at 08:01

## 2023-01-12 RX ADMIN — ATORVASTATIN CALCIUM 40 MG: 40 TABLET, FILM COATED ORAL at 08:01

## 2023-01-12 RX ADMIN — METFORMIN HYDROCHLORIDE 1000 MG: 500 TABLET, FILM COATED ORAL at 08:01

## 2023-01-12 RX ADMIN — FUROSEMIDE 40 MG: 40 TABLET ORAL at 08:01

## 2023-01-12 RX ADMIN — PREDNISONE 5 MG: 5 TABLET ORAL at 08:01

## 2023-01-12 RX ADMIN — Medication 6 MG: at 08:01

## 2023-01-12 RX ADMIN — OXYCODONE HYDROCHLORIDE 10 MG: 10 TABLET ORAL at 01:01

## 2023-01-12 RX ADMIN — INSULIN ASPART 2 UNITS: 100 INJECTION, SOLUTION INTRAVENOUS; SUBCUTANEOUS at 11:01

## 2023-01-12 NOTE — NURSING
Nurses Note -- 4 Eyes      1/11/2023   7:30 PM      Skin assessed during: Q Shift Change      [] No Pressure Injuries Present    []Prevention Measures Documented      [] Yes- Altered Skin Integrity Present or Discovered   [] LDA Added if Not in Epic (Describe Wound)   [x] New Altered Skin Integrity was Present on Admit and Documented in LDA   [] Wound Image Taken    Wound Care Consulted? No    Attending Nurse:  Teofilo Lizarraga LPN     Second RN/Staff Member:  Kaleigh Woods RN

## 2023-01-12 NOTE — PT/OT/SLP PROGRESS
Occupational Therapy   Treatment    Name: Karina Rojas  MRN: [id number]  Admitting Diagnosis:  R SHYANNE       Recommendations:     Discharge Recommendations:    Discharge Equipment Recommendations:  hip kit  Barriers to discharge:  None    Assessment:     Karina Rojas is a 82 y.o. female with a medical diagnosis of post R SHYANNE.  She presents with no new complaints. Performance deficits affecting function are weakness, impaired endurance, impaired self care skills, impaired functional mobility, gait instability, impaired balance, decreased lower extremity function, pain, decreased ROM, impaired skin, impaired cardiopulmonary response to activity, orthopedic precautions.     Rehab Prognosis:  Good; patient would benefit from acute skilled OT services to address these deficits and reach maximum level of function.       Plan:     Patient to be seen 5 x/week to address the above listed problems via self-care/home management, therapeutic activities, therapeutic exercises, wheelchair management/training  Plan of Care Expires: 02/10/23  Plan of Care Reviewed with: patient, caregiver, daughter    Subjective     Pain/Comfort:       Objective:     Communicated with: pt and her daughter present in her room prior to session.  Patient found sitting edge of bed with oxygen (pt uses oxygen at home constantly, unable to even be transported in hallway for 1 to 2 minutes without supp ox.) upon OT entry to room.    General Precautions: Standard,      Orthopedic Precautions:   Braces: N/A  Respiratory Status: Nasal cannula, flow 2 L/min     Occupational Performance:     Bed Mobility:  with slightly extra time to complete  Patient completed Rolling/Turning to Left with  stand by assistance  Patient completed Rolling/Turning to Right with stand by assistance  Patient completed Scooting/Bridging with stand by assistance  Patient completed Supine to Sit with stand by assistance  Patient completed Sit to Supine with stand by assistance      OT did not assist today with transfers    Activities of Daily Living:  Lower Body Dressing: minimum assistance discussed with pt the need for she and her daughter to begin initiating bathing/dressing process together and calling for assist only as needed to allow pt to progress with independence with primary caregiver who will be inhome with her.  They relate good understanding and the need to call for assist if safety or physical assist is needed from staff.        [facility] 6 Click ADL: 21    Treatment & Education:  Upon OT arrival to pt room, she required min a to get her slippers on. Though OT encouraged her use of AE, pt politely refused it.  Then, OT took pt to OT gym post ADL discussion with her and her daughter; pt able to complete 2 sets of 5 min with UBE at level 1 resist.  OT cued pt throughout for F/R motions as well as monitored her oxygen saturation response during this exercise. With 1 brief RB, pt oxygen sat was at 99% with 2L supplemental oxygen.  No significant SOB noted throughout this exercise with pt progressing in endurance and strength.  Then, OT setup pt with tabletop task of green putty manipulation to promote pinch and  strength after OT provided instructions for proper and effective use of this activity.  Pt tolerated all well.     Patient left up in chair with  LPTA present    GOALS:   Multidisciplinary Problems       Occupational Therapy Goals          Problem: Occupational Therapy    Goal Priority Disciplines Outcome Interventions   Occupational Therapy Goal     OT, PT/OT Ongoing, Progressing    Description: STG: within 2 weeks  Pt will perform grooming with setup at sinkside MET  Pt will bathe with min a at bedside MET  Pt will perform UE dressing with SVN bedside MET  Pt will perform LE dressing with min a to SBA with AE as needed  ONGOING/PROGRESSING  Pt will sit EOB x 15 min with no assistance MET  Pt will transfer bed/chair/bsc with mod a MET and exceeded  Pt will perform  standing task x 3 min with cga/sba assistance MET  Pt will tolerate 45 minutes of tx without fatigue ONGOING/PROGRESSING      LT.Restore to max I with self care and mobility.                          Time Tracking:     OT Date of Treatment: 23  OT Start Time: 942  OT Stop Time:   OT Total Time (min): 38 min    Billable Minutes:Self Care/Home Management 12  Therapeutic Activity 22  Therapeutic Exercise 14    OT/HELENA: OT          2023

## 2023-01-12 NOTE — PT/OT/SLP PROGRESS
Physical Therapy Treatment    Patient Name:  Karina Rojas   MRN:  [id number]    Recommendations:     Discharge Recommendations: home with home health  Discharge Equipment Recommendations: none  Barriers to discharge: Decreased caregiver support    Assessment:     Karina Rojas is a 82 y.o. female admitted with a medical diagnosis of <principal problem not specified>.  She presents with the following impairments/functional limitations: weakness, impaired endurance, gait instability, impaired balance, pain, orthopedic precautions. Occasional rest breaks required due to noted fatigue. Patient with no reports of adverse effects or increased pain after completion of treatment.     Rehab Prognosis: Fair; patient would benefit from acute skilled PT services to address these deficits and reach maximum level of function.    Recent Surgery: * No surgery found *      Plan:     During this hospitalization, patient to be seen 5 x/week to address the identified rehab impairments via gait training, therapeutic activities, therapeutic exercises and progress toward the following goals:    Plan of Care Expires:  02/03/23    Subjective     Chief Complaint: Patient stated that she had pain in her back, her R hip, and also that she had pain while moving her calf and she stated that it was tender to the touch. Patient's calf felt warm to the touch prior to treatment.   Patient/Family Comments/goals: DC home with home health when able.  Pain/Comfort:  Pain Rating 1: 5/10  Location - Side 1: Bilateral  Location - Orientation 1: generalized  Location 1: back (and R hip)  Pain Addressed 1: Pre-medicate for activity, Reposition      Objective:     Communicated with pt prior to session. Patient found HOB elevated with oxygen upon PTA entry to room.     General Precautions: Standard, fall, respiratory  Orthopedic Precautions: RLE weight bearing as tolerated, spinal precautions  Braces: N/A  Respiratory Status: Nasal cannula, flow 2 L/min      Functional Mobility:  Bed Mobility:     Rolling Right: stand by assistance  Supine to Sit: stand by assistance  Transfers:     Sit to Stand:  stand by assistance and contact guard assistance with rolling walker  Bed to Chair: contact guard assistance with  rolling walker  using  Step Transfer  Gait: pt ambulated 16 feet in room with RW and CGA.      AM-PAC 6 CLICK MOBILITY  Turning over in bed (including adjusting bedclothes, sheets and blankets)?: 4  Sitting down on and standing up from a chair with arms (e.g., wheelchair, bedside commode, etc.): 3  Moving from lying on back to sitting on the side of the bed?: 4  Moving to and from a bed to a chair (including a wheelchair)?: 3  Need to walk in hospital room?: 3  Climbing 3-5 steps with a railing?: 1  Basic Mobility Total Score: 18       Treatment & Education:  Patient performed the following therapeutic exercises on BLE: SLRs 10x with assist for RLE, Hip ABD/ADD 10x with assist for RLE, glute sets 10x, ankle pumps 10x, quad sets 10x, LAQs 10x, marching 10x     Patient left up in chair with call button in reach and CNA notified.    GOALS:   Multidisciplinary Problems       Physical Therapy Goals          Problem: Physical Therapy    Goal Priority Disciplines Outcome Goal Variances Interventions   Physical Therapy Goal     PT, PT/OT Ongoing, Progressing     Description: STG - 2 weeks  1. Pt will perform sit to/from stand and bed transfer with min A x 1.-MET  2. Pt will transfer supine to/from sit with min A x 1.-PROGRESSING  3. Pt will amb with RW/O2 25 ft with min A x 1.-PROGRESSING      LTG - 4 weeks  1. Pt will perform sit to/from stand and bed transfer with SBA  2. Pt will transfer supine to/from sit with SBA.  3. Pt will amb with RW/O2 25 ft with SBA.                           Time Tracking:     PT Received On: 01/11/23  PT Start Time: 1035     PT Stop Time: 1100  PT Total Time (min): 25 min     Billable Minutes: Therapeutic Activity 8 and Therapeutic Exercise  17    Treatment Type: Treatment  PT/PTA: PTA     PTA Visit Number: 1     01/12/2023

## 2023-01-12 NOTE — PLAN OF CARE
Problem: Gas Exchange Impaired  Goal: Optimal Gas Exchange  Outcome: Ongoing, Progressing     Problem: Adjustment to Illness COPD (Chronic Obstructive Pulmonary Disease)  Goal: Optimal Chronic Illness Coping  Outcome: Ongoing, Progressing     Problem: Functional Ability Impaired COPD (Chronic Obstructive Pulmonary Disease)  Goal: Optimal Level of Functional [place]  Outcome: Ongoing, Progressing     Problem: Infection COPD (Chronic Obstructive Pulmonary Disease)  Goal: Absence of Infection Signs and Symptoms  Outcome: Ongoing, Progressing     Problem: Oral Intake Inadequate COPD (Chronic Obstructive Pulmonary Disease)  Goal: Improved Nutrition Intake  Outcome: Ongoing, Progressing     Problem: Respiratory Compromise COPD (Chronic Obstructive Pulmonary Disease)  Goal: Effective Oxygenation and Ventilation  Outcome: Ongoing, Progressing

## 2023-01-12 NOTE — PLAN OF CARE
Problem: Skin Injury Risk Increased  Goal: Skin Health and Integrity  Outcome: Ongoing, Progressing     Problem: Adult Inpatient Plan of Care  Goal: Absence of Hospital-Acquired Illness or Injury  Outcome: Ongoing, Progressing  Goal: Optimal Comfort and Wellbeing  Outcome: Ongoing, Progressing

## 2023-01-12 NOTE — PLAN OF CARE
Problem: Gas Exchange Impaired  Goal: Optimal Gas Exchange  Outcome: Ongoing, Progressing     Problem: Adjustment to Illness COPD (Chronic Obstructive Pulmonary Disease)  Goal: Optimal Chronic Illness Coping  Outcome: Ongoing, Progressing     Problem: Functional Ability Impaired COPD (Chronic Obstructive Pulmonary Disease)  Goal: Optimal Level of Functional [place]  Outcome: Ongoing, Progressing     Problem: Infection COPD (Chronic Obstructive Pulmonary Disease)  Goal: Absence of Infection Signs and Symptoms  Outcome: Ongoing, Progressing     Problem: Oral Intake Inadequate COPD (Chronic Obstructive Pulmonary Disease)  Goal: Improved Nutrition Intake  Outcome: Ongoing, Progressing     Problem: Respiratory Compromise COPD (Chronic Obstructive Pulmonary Disease)  Goal: Effective Oxygenation and Ventilation  Outcome: Ongoing, Progressing

## 2023-01-13 LAB
ANION GAP SERPL CALCULATED.3IONS-SCNC: 11 MMOL/L (ref 7–16)
BASOPHILS # BLD AUTO: 0.04 K/UL (ref 0–0.2)
BASOPHILS NFR BLD AUTO: 0.4 % (ref 0–1)
BUN SERPL-MCNC: 16 MG/DL (ref 7–18)
BUN/CREAT SERPL: 15 (ref 6–20)
CALCIUM SERPL-MCNC: 8.5 MG/DL (ref 8.5–10.1)
CHLORIDE SERPL-SCNC: 97 MMOL/L (ref 98–107)
CO2 SERPL-SCNC: 36 MMOL/L (ref 21–32)
CREAT SERPL-MCNC: 1.09 MG/DL (ref 0.55–1.02)
DIFFERENTIAL METHOD BLD: ABNORMAL
EGFR (NO RACE VARIABLE) (RUSH/TITUS): 51 ML/MIN/1.73M²
EOSINOPHIL # BLD AUTO: 0.15 K/UL (ref 0–0.5)
EOSINOPHIL NFR BLD AUTO: 1.6 % (ref 1–4)
EOSINOPHIL NFR BLD MANUAL: 2 % (ref 1–4)
ERYTHROCYTE [DISTWIDTH] IN BLOOD BY AUTOMATED COUNT: 17.6 % (ref 11.5–14.5)
GLUCOSE SERPL-MCNC: 116 MG/DL (ref 74–106)
GLUCOSE SERPL-MCNC: 134 MG/DL (ref 70–105)
GLUCOSE SERPL-MCNC: 154 MG/DL (ref 70–105)
GLUCOSE SERPL-MCNC: 186 MG/DL (ref 70–105)
GLUCOSE SERPL-MCNC: 218 MG/DL (ref 70–105)
HCT VFR BLD AUTO: 36.2 % (ref 38–47)
HGB BLD-MCNC: 10.8 G/DL (ref 12–16)
INR BLD: 3.62
LYMPHOCYTES # BLD AUTO: 1.49 K/UL (ref 1–4.8)
LYMPHOCYTES NFR BLD AUTO: 15.5 % (ref 27–41)
LYMPHOCYTES NFR BLD MANUAL: 18 % (ref 27–41)
MCH RBC QN AUTO: 28.9 PG (ref 27–31)
MCHC RBC AUTO-ENTMCNC: 29.8 G/DL (ref 32–36)
MCV RBC AUTO: 96.8 FL (ref 80–96)
MONOCYTES # BLD AUTO: 0.73 K/UL (ref 0–0.8)
MONOCYTES NFR BLD AUTO: 7.6 % (ref 2–6)
MONOCYTES NFR BLD MANUAL: 8 % (ref 2–6)
MPC BLD CALC-MCNC: 9.8 FL (ref 9.4–12.4)
NEUTROPHILS # BLD AUTO: 7.19 K/UL (ref 1.8–7.7)
NEUTROPHILS NFR BLD AUTO: 74.9 % (ref 53–65)
NEUTS SEG NFR BLD MANUAL: 72 % (ref 50–62)
PLATELET # BLD AUTO: 444 K/UL (ref 150–400)
PLATELET MORPHOLOGY: NORMAL
POTASSIUM SERPL-SCNC: 4.2 MMOL/L (ref 3.5–5.1)
PROTHROMBIN TIME: 35.8 SECONDS (ref 11.7–14.7)
RBC # BLD AUTO: 3.74 M/UL (ref 4.2–5.4)
SODIUM SERPL-SCNC: 140 MMOL/L (ref 136–145)
WBC # BLD AUTO: 9.6 K/UL (ref 4.5–11)

## 2023-01-13 PROCEDURE — 25000003 PHARM REV CODE 250: Performed by: HOSPITALIST

## 2023-01-13 PROCEDURE — 80048 BASIC METABOLIC PNL TOTAL CA: CPT | Performed by: NURSE PRACTITIONER

## 2023-01-13 PROCEDURE — 99900035 HC TECH TIME PER 15 MIN (STAT)

## 2023-01-13 PROCEDURE — 97530 THERAPEUTIC ACTIVITIES: CPT

## 2023-01-13 PROCEDURE — 85025 COMPLETE CBC W/AUTO DIFF WBC: CPT | Performed by: NURSE PRACTITIONER

## 2023-01-13 PROCEDURE — 85610 PROTHROMBIN TIME: CPT | Performed by: NURSE PRACTITIONER

## 2023-01-13 PROCEDURE — 25000003 PHARM REV CODE 250: Performed by: NURSE PRACTITIONER

## 2023-01-13 PROCEDURE — 94761 N-INVAS EAR/PLS OXIMETRY MLT: CPT

## 2023-01-13 PROCEDURE — 99308 PR NURSING FAC CARE, SUBSEQ, MINOR COMPLIC: ICD-10-PCS | Mod: ,,, | Performed by: HOSPITALIST

## 2023-01-13 PROCEDURE — 82962 GLUCOSE BLOOD TEST: CPT

## 2023-01-13 PROCEDURE — 36415 COLL VENOUS BLD VENIPUNCTURE: CPT | Performed by: NURSE PRACTITIONER

## 2023-01-13 PROCEDURE — 97110 THERAPEUTIC EXERCISES: CPT | Mod: CQ

## 2023-01-13 PROCEDURE — 99308 SBSQ NF CARE LOW MDM 20: CPT | Mod: ,,, | Performed by: HOSPITALIST

## 2023-01-13 PROCEDURE — 27000221 HC OXYGEN, UP TO 24 HOURS

## 2023-01-13 PROCEDURE — 11000004 HC SNF PRIVATE

## 2023-01-13 PROCEDURE — 63600175 PHARM REV CODE 636 W HCPCS: Performed by: NURSE PRACTITIONER

## 2023-01-13 PROCEDURE — 97116 GAIT TRAINING THERAPY: CPT | Mod: CQ

## 2023-01-13 RX ADMIN — METFORMIN HYDROCHLORIDE 1000 MG: 500 TABLET, FILM COATED ORAL at 08:01

## 2023-01-13 RX ADMIN — INSULIN ASPART 2 UNITS: 100 INJECTION, SOLUTION INTRAVENOUS; SUBCUTANEOUS at 04:01

## 2023-01-13 RX ADMIN — FUROSEMIDE 40 MG: 40 TABLET ORAL at 08:01

## 2023-01-13 RX ADMIN — OXYCODONE HYDROCHLORIDE 10 MG: 10 TABLET ORAL at 02:01

## 2023-01-13 RX ADMIN — POLYETHYLENE GLYCOL 3350 17 G: 17 POWDER, FOR SOLUTION ORAL at 08:01

## 2023-01-13 RX ADMIN — DIGOXIN 0.12 MG: 125 TABLET ORAL at 08:01

## 2023-01-13 RX ADMIN — ATORVASTATIN CALCIUM 40 MG: 40 TABLET, FILM COATED ORAL at 09:01

## 2023-01-13 RX ADMIN — PREDNISONE 5 MG: 5 TABLET ORAL at 08:01

## 2023-01-13 RX ADMIN — LEVOTHYROXINE SODIUM 25 MCG: 0.03 TABLET ORAL at 05:01

## 2023-01-13 RX ADMIN — OXYCODONE HYDROCHLORIDE 10 MG: 10 TABLET ORAL at 04:01

## 2023-01-13 RX ADMIN — AMIODARONE HYDROCHLORIDE 200 MG: 200 TABLET ORAL at 09:01

## 2023-01-13 RX ADMIN — AMIODARONE HYDROCHLORIDE 200 MG: 200 TABLET ORAL at 08:01

## 2023-01-13 RX ADMIN — OXYCODONE HYDROCHLORIDE 10 MG: 10 TABLET ORAL at 09:01

## 2023-01-13 RX ADMIN — WARFARIN SODIUM 4 MG: 1 TABLET ORAL at 04:01

## 2023-01-13 RX ADMIN — ONDANSETRON 4 MG: 4 TABLET, ORALLY DISINTEGRATING ORAL at 02:01

## 2023-01-13 NOTE — PT/OT/SLP PROGRESS
Physical Therapy Treatment    Patient Name:  Karina Rojas   MRN:  [id number]    Recommendations:     Discharge Recommendations: home with home health  Discharge Equipment Recommendations: none  Barriers to discharge: Decreased caregiver support    Assessment:     Karina Rojas is a 82 y.o. female admitted with a medical diagnosis of S/P total right hip arthroplasty.  She presents with the following impairments/functional limitations: weakness, impaired endurance, gait instability, impaired balance, pain, orthopedic precautions. Treatment performed in room due to patient reporting that she felt nauseas. Patient required verbal cues for proper hand placement during sit to stand. Occasional rest breaks needed due to reports of nausea and fatigue. Treatment stopped early due to reports of nausea and back pain. Patient requested medication for nausea and back pain, nurse notified of patient's reports.     Rehab Prognosis: Fair; patient would benefit from acute skilled PT services to address these deficits and reach maximum level of function.    Recent Surgery: * No surgery found *      Plan:     During this hospitalization, patient to be seen BID to address the identified rehab impairments via gait training, therapeutic activities, therapeutic exercises and progress toward the following goals:    Plan of Care Expires:  02/03/23    Subjective     Chief Complaint: Patient stated that she had pain in her back, and stated that she was feeling nauseas.   Patient/Family Comments/goals: DC home with home health when able.  Pain/Comfort:  Pain Rating 1: 6/10  Location - Side 1: Bilateral  Location - Orientation 1: generalized  Location 1: back  Pain Addressed 1: Nurse notified      Objective:     Communicated with pt prior to session. Patient found HOB elevated with oxygen upon PTA entry to room.     General Precautions: Standard, fall, respiratory  Orthopedic Precautions: RLE weight bearing as tolerated, spinal  precautions  Braces: N/A  Respiratory Status: Nasal cannula, flow 2 L/min     Functional Mobility:  Bed Mobility:     Rolling Right: stand by assistance  Supine to Sit: stand by assistance  Transfers:     Sit to Stand:  stand by assistance with rolling walker  Bed to Chair: stand by assistance and contact guard assistance with  rolling walker  using  Step Transfer  Gait: pt ambulated 4 feet with RW and SBA/CGA.      AM-PAC 6 CLICK MOBILITY  Turning over in bed (including adjusting bedclothes, sheets and blankets)?: 4  Sitting down on and standing up from a chair with arms (e.g., wheelchair, bedside commode, etc.): 4  Moving from lying on back to sitting on the side of the bed?: 4  Moving to and from a bed to a chair (including a wheelchair)?: 3  Need to walk in hospital room?: 3  Climbing 3-5 steps with a railing?: 1  Basic Mobility Total Score: 19       Treatment & Education:  Patient performed the following therapeutic exercises on RLE: standing marching, standing hip ABD  Sit to stand 5x from EOB  Patient left up in chair with  CNA present.    GOALS:   Multidisciplinary Problems       Physical Therapy Goals          Problem: Physical Therapy    Goal Priority Disciplines Outcome Goal Variances Interventions   Physical Therapy Goal     PT, PT/OT Ongoing, Progressing     Description: STG - 2 weeks  1. Pt will perform sit to/from stand and bed transfer with min A x 1.-MET  2. Pt will transfer supine to/from sit with min A x 1.-PROGRESSING  3. Pt will amb with RW/O2 25 ft with min A x 1.-PROGRESSING      LTG - 4 weeks  1. Pt will perform sit to/from stand and bed transfer with SBA  2. Pt will transfer supine to/from sit with SBA.  3. Pt will amb with RW/O2 25 ft with SBA.                           Time Tracking:     PT Received On: 01/13/23  PT Start Time: 1350     PT Stop Time: 1404  PT Total Time (min): 14 min     Billable Minutes: Therapeutic Activity 5 and Therapeutic Exercise 9    Treatment Type:  Treatment  PT/PTA: PTA     PTA Visit Number: 4     01/13/2023

## 2023-01-13 NOTE — PT/OT/SLP PROGRESS
"Physical Therapy Treatment    Patient Name:  Karina Rojas   MRN:  [id number]    Recommendations:     Discharge Recommendations: home with home health  Discharge Equipment Recommendations: none  Barriers to discharge: Decreased caregiver support    Assessment:     Karina Rojas is a 82 y.o. female admitted with a medical diagnosis of <principal problem not specified>.  She presents with the following impairments/functional limitations: weakness, impaired endurance, gait instability, impaired balance, pain, orthopedic precautions. Pt required occasional rest breaks required due to noted fatigue. When first going outside to got into rehab gym, pt stated that the sunlight bothered her eyes and made her "see triple" but she stated that it stopped after a short time after returning inside. Patient with no reports of adverse effects or increased pain after completion of treatment.     Rehab Prognosis: Fair; patient would benefit from acute skilled PT services to address these deficits and reach maximum level of function.    Recent Surgery: * No surgery found *      Plan:     During this hospitalization, patient to be seen 5 x/week to address the identified rehab impairments via gait training, therapeutic activities, therapeutic exercises and progress toward the following goals:    Plan of Care Expires:  02/03/23    Subjective     Chief Complaint: Patient stated that she had pain in her back.  Patient/Family Comments/goals: DC home with home health when able.  Pain/Comfort:  Pain Rating 1: 7/10  Location - Side 1: Bilateral  Location - Orientation 1: generalized  Location 1: back  Pain Addressed 1: Pre-medicate for activity      Objective:     Communicated with pt prior to session. Patient found HOB elevated with oxygen upon PTA entry to room.     General Precautions: Standard, fall, respiratory  Orthopedic Precautions: RLE weight bearing as tolerated, spinal precautions  Braces: N/A  Respiratory Status: Nasal cannula, " flow 2 L/min     Functional Mobility:  Transfers:     Sit to Stand:  stand by assistance with rolling walker  Gait: pt ambulated 22 feet with RW and CGA.      AM-PAC 6 CLICK MOBILITY  Turning over in bed (including adjusting bedclothes, sheets and blankets)?: 4  Sitting down on and standing up from a chair with arms (e.g., wheelchair, bedside commode, etc.): 3  Moving from lying on back to sitting on the side of the bed?: 4  Moving to and from a bed to a chair (including a wheelchair)?: 3  Need to walk in hospital room?: 3  Climbing 3-5 steps with a railing?: 1  Basic Mobility Total Score: 18       Treatment & Education:  Patient performed the following therapeutic exercises on BLE 15x: Seated marching, LAQs, Hip ADD with red TB, hip ABD with red TB, Hip ext with red TB, HS curls with red TB, 5 minutes nustep   Patient left up in chair with call button in reach and family members present.    GOALS:   Multidisciplinary Problems       Physical Therapy Goals          Problem: Physical Therapy    Goal Priority Disciplines Outcome Goal Variances Interventions   Physical Therapy Goal     PT, PT/OT Ongoing, Progressing     Description: STG - 2 weeks  1. Pt will perform sit to/from stand and bed transfer with min A x 1.-MET  2. Pt will transfer supine to/from sit with min A x 1.-PROGRESSING  3. Pt will amb with RW/O2 25 ft with min A x 1.-PROGRESSING      LTG - 4 weeks  1. Pt will perform sit to/from stand and bed transfer with SBA  2. Pt will transfer supine to/from sit with SBA.  3. Pt will amb with RW/O2 25 ft with SBA.                           Time Tracking:     PT Received On: 01/12/23  PT Start Time: 1022     PT Stop Time: 1102  PT Total Time (min): 40 min     Billable Minutes: Gait Training 8 and Therapeutic Exercise 32    Treatment Type: Treatment  PT/PTA: PTA     PTA Visit Number: 2     01/13/2023

## 2023-01-13 NOTE — ASSESSMENT & PLAN NOTE
Rate control with amiodarone and dig.  Stroke ppx with Coumadin.  Coumadin has been resumed but holding at times when above 3.  Pharmacy following and adjusting.

## 2023-01-13 NOTE — PT/OT/SLP PROGRESS
Occupational Therapy   Treatment    Name: Karina Rojas  MRN: [id number]  Admitting Diagnosis:  S/P total right hip arthroplasty       Recommendations:     Discharge Recommendations:    Discharge Equipment Recommendations:  hip kit  Barriers to discharge:  None    Assessment:     Karina Rojas is a 82 y.o. female with a medical diagnosis of S/P total right hip arthroplasty.  She presents with much increased back pain and not wishing to participate in UB exercises this afternoon, so OT educated her on HOME EXERCISE PROGRAM, modifying toileting strategies for improving independence, and readying for d/c to home with family and home health. Performance deficits affecting function are weakness, impaired endurance, impaired self care skills, impaired functional mobility, gait instability, impaired balance, decreased lower extremity function, pain, decreased ROM, impaired skin, impaired cardiopulmonary response to activity, orthopedic precautions.     Rehab Prognosis:  Good; patient would benefit from acute skilled OT services to address these deficits and reach maximum level of function.       Plan:     Patient to be seen 5 x/week to address the above listed problems via self-care/home management, therapeutic activities, therapeutic exercises, wheelchair management/training  Plan of Care Expires: 02/10/23  Plan of Care Reviewed with: patient, caregiver, daughter    Subjective     Pain/Comfort:   See PT    Objective:     Communicated with: pt, daughter, LPTA prior to session.  Patient found HOB elevated with oxygen (pt uses oxygen at home constantly, unable to even be transported in hallway for 1 to 2 minutes without supp ox.) upon OT entry to room.    General Precautions: Standard,      Orthopedic Precautions:   Braces: N/A  Respiratory Status: Nasal cannula, flow 2 L/min     Occupational Performance:     Functional Mobility/Transfers:  Discussed that increasing independence with RW to regular toilet would be a good  "preliminary goal to going home.  It was also discussed that she needs a longer length of oxygen tubing "like I use at home"    Activities of Daily Living:  Lower Body Dressing: minimum assistance pt has politely refused most LB AE for improving independence with this task.  She reports her daughter will be with her all the time, so if she needs any help, she will have it.  Toileting: contact guard assistance discussed with pt to begin progressing to regular toilet rather than using BSC, and also placing BSC frame over regular toilet to provide more support for sit to stand.  Her daughter was very eager to have her try this, but pt states, "I'll leave it by the bedside for one more day."  Will plan to implement this next week with pt to prepare for d/c home with daughter.      [facility] 6 Click ADL: 22    Treatment & Education:  Tx as noted above.  Pt states, "I can do my HOME EXERCISE PROGRAM by myself with my arms."  OT to distribute green putty for hand strength exercises in room over the weekend.    Patient left HOB elevated with  daughter and LPTA present    GOALS:   Multidisciplinary Problems       Occupational Therapy Goals          Problem: Occupational Therapy    Goal Priority Disciplines Outcome Interventions   Occupational Therapy Goal     OT, PT/OT Ongoing, Progressing    Description: STG: within 2 weeks  Pt will perform grooming with setup at sinkside MET  Pt will bathe with min a at bedside MET  Pt will perform UE dressing with SVN bedside MET  Pt will perform LE dressing with min a to SBA with AE as needed  ONGOING/PROGRESSING  Pt will sit EOB x 15 min with no assistance MET  Pt will transfer bed/chair/bsc with mod a MET and exceeded  Pt will perform standing task x 3 min with cga/sba assistance MET  Pt will tolerate 45 minutes of tx without fatigue ONGOING/PROGRESSING      LT.Restore to max I with self care and mobility.                          Time Tracking:     OT Date of Treatment: 23  OT " Start Time: 1340  OT Stop Time: 1350  OT Total Time (min): 10 min    Billable Minutes:Therapeutic Activity 10    OT/HELENA: OT          1/13/2023

## 2023-01-13 NOTE — PT/OT/SLP PROGRESS
Physical Therapy Treatment    Patient Name:  Karina Rojas   MRN:  [id number]    Recommendations:     Discharge Recommendations: home with home health  Discharge Equipment Recommendations: none  Barriers to discharge: Decreased caregiver support    Assessment:     Karina Rojas is a 82 y.o. female admitted with a medical diagnosis of S/P total right hip arthroplasty.  She presents with the following impairments/functional limitations: weakness, impaired endurance, gait instability, impaired balance, pain, orthopedic precautions. Patient stated that she had her staples removed this morning. Pt required frequent rest breaks due to reports of pain, nausea, and fatigue. Patient reported that she had increased back pain after treatment, but she declined MHP and cold pack application.     Rehab Prognosis: Fair; patient would benefit from acute skilled PT services to address these deficits and reach maximum level of function.    Recent Surgery: * No surgery found *      Plan:     During this hospitalization, patient to be seen BID to address the identified rehab impairments via gait training, therapeutic activities, therapeutic exercises and progress toward the following goals:    Plan of Care Expires:  02/03/23    Subjective     Chief Complaint: Patient stated that she had pain in her back, and stated that she was feeling nauseas.   Patient/Family Comments/goals: DC home with home health when able.  Pain/Comfort:  Pain Rating 1: 6/10  Location - Side 1: Bilateral  Location - Orientation 1: generalized  Location 1: back  Pain Addressed 1: Pre-medicate for activity      Objective:     Communicated with pt prior to session. Patient found HOB elevated with oxygen upon PTA entry to room.     General Precautions: Standard, fall, respiratory  Orthopedic Precautions: RLE weight bearing as tolerated, spinal precautions  Braces: N/A  Respiratory Status: Nasal cannula, flow 2 L/min     Functional Mobility:  Bed Mobility:      Rolling Left:  stand by assistance  Rolling Right: stand by assistance  Supine to Sit: stand by assistance  Sit to Supine: stand by assistance, minimum assistance, and for RLE  Transfers:     Sit to Stand:  stand by assistance with rolling walker  Gait: pt ambulated 10 feet +10 feet with RW and SBA/CGA.      AM-PAC 6 CLICK MOBILITY  Turning over in bed (including adjusting bedclothes, sheets and blankets)?: 4  Sitting down on and standing up from a chair with arms (e.g., wheelchair, bedside commode, etc.): 3  Moving from lying on back to sitting on the side of the bed?: 4  Moving to and from a bed to a chair (including a wheelchair)?: 3  Need to walk in hospital room?: 3  Climbing 3-5 steps with a railing?: 1  Basic Mobility Total Score: 18       Treatment & Education:  Patient performed the following therapeutic exercises on BLE: SLRs 10x with assist for RLE (stopped at 8 on LLE due to reports of back pain), ankle pumps 20x, hip ADD/ABD with knee extended 10x with assist for RLE  Patient left  partially L sidelying with HOB elevated  with call button in reach.    GOALS:   Multidisciplinary Problems       Physical Therapy Goals          Problem: Physical Therapy    Goal Priority Disciplines Outcome Goal Variances Interventions   Physical Therapy Goal     PT, PT/OT Ongoing, Progressing     Description: STG - 2 weeks  1. Pt will perform sit to/from stand and bed transfer with min A x 1.-MET  2. Pt will transfer supine to/from sit with min A x 1.-PROGRESSING  3. Pt will amb with RW/O2 25 ft with min A x 1.-PROGRESSING      LTG - 4 weeks  1. Pt will perform sit to/from stand and bed transfer with SBA  2. Pt will transfer supine to/from sit with SBA.  3. Pt will amb with RW/O2 25 ft with SBA.                           Time Tracking:     PT Received On: 01/13/23  PT Start Time: 1050     PT Stop Time: 1108  PT Total Time (min): 18 min     Billable Minutes: Gait Training 5, Therapeutic Activity 3, and Therapeutic Exercise  10    Treatment Type: Treatment  PT/PTA: PTA     PTA Visit Number: 3     01/13/2023

## 2023-01-13 NOTE — SUBJECTIVE & OBJECTIVE
Interval History: doing well with therapy, motivated to improve.     Review of Systems   Respiratory:  Negative for shortness of breath.    Cardiovascular:  Negative for chest pain.   Gastrointestinal:  Negative for abdominal pain, nausea and vomiting.   Musculoskeletal:  Positive for arthralgias and back pain.   Psychiatric/Behavioral:  Negative for agitation and confusion.    All other systems reviewed and are negative.  Objective:     Vital Signs (Most Recent):  Temp: 97.7 °F (36.5 °C) (01/12/23 2011)  Pulse: 65 (01/13/23 0841)  Resp: 17 (01/13/23 0441)  BP: (!) 153/53 (01/13/23 0841)  SpO2: 96 % (01/12/23 2011)   Vital Signs (24h Range):  Temp:  [97.7 °F (36.5 °C)] 97.7 °F (36.5 °C)  Pulse:  [57-65] 65  Resp:  [17-20] 17  SpO2:  [96 %-100 %] 96 %  BP: (122-153)/(38-53) 153/53     Weight: 67 kg (147 lb 9.6 oz)  Body mass index is 22.45 kg/m².    Intake/Output Summary (Last 24 hours) at 1/13/2023 0941  Last data filed at 1/13/2023 0625  Gross per 24 hour   Intake 720 ml   Output --   Net 720 ml      Physical Exam  Vitals reviewed.   Constitutional:       General: She is not in acute distress.     Appearance: Normal appearance.   HENT:      Head: Normocephalic and atraumatic.   Eyes:      General: No scleral icterus.     Extraocular Movements: Extraocular movements intact.      Conjunctiva/sclera: Conjunctivae normal.      Pupils: Pupils are equal, round, and reactive to light.   Cardiovascular:      Rate and Rhythm: Normal rate. Rhythm irregular.      Heart sounds: No murmur heard.    No friction rub. No gallop.   Pulmonary:      Effort: Pulmonary effort is normal. No respiratory distress.      Breath sounds: Normal breath sounds. No wheezing or rales.   Abdominal:      General: Abdomen is flat. Bowel sounds are normal. There is no distension.      Palpations: Abdomen is soft.      Tenderness: There is no abdominal tenderness. There is no guarding.   Musculoskeletal:         General: No swelling.      Right lower  leg: No edema.      Left lower leg: No edema.   Skin:     General: Skin is warm and dry.      Coloration: Skin is not jaundiced.      Findings: No rash.   Neurological:      General: No focal deficit present.      Mental Status: She is alert and oriented to person, place, and time.      Sensory: No sensory deficit.      Motor: No weakness.   Psychiatric:         Mood and Affect: Mood normal.         Thought Content: Thought content normal.         Judgment: Judgment normal.       Significant Labs: All pertinent labs within the past 24 hours have been reviewed.  Recent Lab Results  (Last 5 results in the past 24 hours)        01/13/23  0617   01/13/23  0536   01/12/23  2044   01/12/23  1641   01/12/23  1102        Anion Gap 11               Baso # 0.04               Basophil % 0.4               BUN 16               BUN/CREAT RATIO 15               Calcium 8.5               Chloride 97               CO2 36               Creatinine 1.09               Differential Type Manual               eGFR 51               Eos # 0.15               Eosinophil % 1.6                2               Glucose 116               Hematocrit 36.2               Hemoglobin 10.8               INR 3.62               Lymph # 1.49               Lymph % 15.5                18               MCH 28.9               MCHC 29.8               MCV 96.8               Mono # 0.73               Mono % 7.6                8               MPV 9.8               Neutrophils, Abs 7.19               Neutrophils Relative 74.9               PLATELET MORPHOLOGY Normal               Platelets 444               POC Glucose   134   233   180   217       Potassium 4.2               Protime 35.8               RBC 3.74               RDW 17.6               Segmented Neutrophils, Man % 72               Sodium 140               WBC 9.60                                      Significant Imaging: I have reviewed all pertinent imaging results/findings within the past 24 hours.

## 2023-01-13 NOTE — PLAN OF CARE
Problem: Gas Exchange Impaired  Goal: Optimal Gas Exchange  Outcome: Ongoing, Progressing     Problem: Adjustment to Illness COPD (Chronic Obstructive Pulmonary Disease)  Goal: Optimal Chronic Illness Coping  Outcome: Ongoing, Progressing     Problem: Functional Ability Impaired COPD (Chronic Obstructive Pulmonary Disease)  Goal: Optimal Level of Functional [place]  Outcome: Ongoing, Progressing     Problem: Infection COPD (Chronic Obstructive Pulmonary Disease)  Goal: Absence of Infection Signs and Symptoms  Outcome: Ongoing, Progressing     Problem: Oral Intake Inadequate COPD (Chronic Obstructive Pulmonary Disease)  Goal: Improved Nutrition Intake  Outcome: Ongoing, Progressing     Problem: Respiratory Compromise COPD (Chronic Obstructive Pulmonary Disease)  Goal: Effective Oxygenation and Ventilation  Outcome: Ongoing, Progressing

## 2023-01-13 NOTE — ASSESSMENT & PLAN NOTE
She is doing well and motivated.   Incision site care as directed by ortho  Staple removal as directed by ortho  Ortho follow up in 3 weeks

## 2023-01-13 NOTE — PROGRESS NOTES
Pt not seen for PM PT treatment on 1/12/23 due to patient reporting increased back pain, and her stating that she wanted to hold off on treatment. Pt declined MHP and ice pack application because she said it would not help with the pain.

## 2023-01-13 NOTE — PROGRESS NOTES
[facility] Medicine  Progress Note    Patient Name: Karina Rojas  MRN: [id number]  Patient Class: IP- Swing   Admission Date: 1/4/2023  Length of Stay: 9 days  Attending Physician: Jorge Buckley DO  Primary Care Provider: Thalia Bowie NP        Subjective:     Principal Problem:S/P total right hip arthroplasty        HPI:  Ms Rojas is an 81 y/o female admitted to [facility] for rehabilitation services following right hip fracture secondary to fall requiring Right hip arthroplasty. She initially presented to [facility] ED on 12/28 after trip and fall in home where she was found to have an acute comminuted and minimally displaced right intertrochanteric fracture. She was transferred to [facility] for ortho intervention. She underwent surgical repair of right hip on 12/30. Non-removable surgical dressing to be removed 10 days post op (1/9). Staples to be removed 14 days post op (1/13) and steri strips applied. Ortho follow up in 3 weeks. Prior to fall, she was ambulatory with walker dependence. She is on chronic O2 @ 2L NC with hx of COPD. She will be admitted for PT/OT rehabilitation with hopes to return home when goals are met.     Code Status: FULL CODE    PMH: HTN, Atrial Fibrillation on coumadin, DM, HF, COPD oxygen dependant.  On 2 liters at home      Overview/Hospital Course:  1/9 Follow up today, in bed, c/o back pain which is chronic.  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  Currently on Lovenox/Coumadin bridge.  Had some leg warmth.     1/13 US neg for DVT.  INR still elevated but pharmacy following.  She is doing well and motivated to improve.       Interval History: doing well with therapy, motivated to improve.     Review of Systems   Respiratory:  Negative for shortness of breath.    Cardiovascular:  Negative for chest pain.   Gastrointestinal:  Negative for abdominal pain, nausea and vomiting.   Musculoskeletal:  Positive for arthralgias and back pain.    Psychiatric/Behavioral:  Negative for agitation and confusion.    All other systems reviewed and are negative.  Objective:     Vital Signs (Most Recent):  Temp: 97.7 °F (36.5 °C) (01/12/23 2011)  Pulse: 65 (01/13/23 0841)  Resp: 17 (01/13/23 0441)  BP: (!) 153/53 (01/13/23 0841)  SpO2: 96 % (01/12/23 2011)   Vital Signs (24h Range):  Temp:  [97.7 °F (36.5 °C)] 97.7 °F (36.5 °C)  Pulse:  [57-65] 65  Resp:  [17-20] 17  SpO2:  [96 %-100 %] 96 %  BP: (122-153)/(38-53) 153/53     Weight: 67 kg (147 lb 9.6 oz)  Body mass index is 22.45 kg/m².    Intake/Output Summary (Last 24 hours) at 1/13/2023 0941  Last data filed at 1/13/2023 0625  Gross per 24 hour   Intake 720 ml   Output --   Net 720 ml      Physical Exam  Vitals reviewed.   Constitutional:       General: She is not in acute distress.     Appearance: Normal appearance.   HENT:      Head: Normocephalic and atraumatic.   Eyes:      General: No scleral icterus.     Extraocular Movements: Extraocular movements intact.      Conjunctiva/sclera: Conjunctivae normal.      Pupils: Pupils are equal, round, and reactive to light.   Cardiovascular:      Rate and Rhythm: Normal rate. Rhythm irregular.      Heart sounds: No murmur heard.    No friction rub. No gallop.   Pulmonary:      Effort: Pulmonary effort is normal. No respiratory distress.      Breath sounds: Normal breath sounds. No wheezing or rales.   Abdominal:      General: Abdomen is flat. Bowel sounds are normal. There is no distension.      Palpations: Abdomen is soft.      Tenderness: There is no abdominal tenderness. There is no guarding.   Musculoskeletal:         General: No swelling.      Right lower leg: No edema.      Left lower leg: No edema.   Skin:     General: Skin is warm and dry.      Coloration: Skin is not jaundiced.      Findings: No rash.   Neurological:      General: No focal deficit present.      Mental Status: She is alert and oriented to person, place, and time.      Sensory: No sensory  deficit.      Motor: No weakness.   Psychiatric:         Mood and Affect: Mood normal.         Thought Content: Thought content normal.         Judgment: Judgment normal.       Significant Labs: All pertinent labs within the past 24 hours have been reviewed.  Recent Lab Results  (Last 5 results in the past 24 hours)        01/13/23  0617   01/13/23  0536   01/12/23  2044   01/12/23  1641   01/12/23  1102        Anion Gap 11               Baso # 0.04               Basophil % 0.4               BUN 16               BUN/CREAT RATIO 15               Calcium 8.5               Chloride 97               CO2 36               Creatinine 1.09               Differential Type Manual               eGFR 51               Eos # 0.15               Eosinophil % 1.6                2               Glucose 116               Hematocrit 36.2               Hemoglobin 10.8               INR 3.62               Lymph # 1.49               Lymph % 15.5                18               MCH 28.9               MCHC 29.8               MCV 96.8               Mono # 0.73               Mono % 7.6                8               MPV 9.8               Neutrophils, Abs 7.19               Neutrophils Relative 74.9               PLATELET MORPHOLOGY Normal               Platelets 444               POC Glucose   134   233   180   217       Potassium 4.2               Protime 35.8               RBC 3.74               RDW 17.6               Segmented Neutrophils, Man % 72               Sodium 140               WBC 9.60                                      Significant Imaging: I have reviewed all pertinent imaging results/findings within the past 24 hours.      Assessment/Plan:      * S/P total right hip arthroplasty  She is doing well and motivated.   Incision site care as directed by ortho  Staple removal as directed by ortho  Ortho follow up in 3 weeks      Acute cystitis without hematuria    Cefdinir. Follow cx.  >100k GNR    Presence of surgical  incision  Dressing to be removed 10 days post-op (1/9), then cleanse daily with hydrogen peroxide, apply Bactroban and cover with 4x4 and secure with paper tape.   Staple removal 14 days post op and apply steri strips   Monitor for signs of infection at site      Diabetes  Last A1c reviewed-   Lab Results   Component Value Date    HGBA1C 7.5 (H) 12/29/2022     Antihyperglycemics (From admission, onward)    Start     Stop Route Frequency Ordered    01/04/23 1417  insulin aspart U-100 injection 0-5 Units         -- SubQ Before meals & nightly PRN 01/04/23 1319        Continue home meds,   Mild SSI with hypoglycemia protocol  Diabetic diet.  Most glucoses acceptable. A few excursions above hospital goal. Monitor.     COPD exacerbation  Continue current med regimen.  Lungs clear, good air movement.   O2 @ 2L NC      Atrial fibrillation  Rate control with amiodarone and dig.  Stroke ppx with Coumadin.  Coumadin has been resumed but holding at times when above 3.  Pharmacy following and adjusting.     HTN (hypertension)  Continue home meds  Routine VS monitoring, parameters set for provider notification      VTE Risk Mitigation (From admission, onward)         Ordered     warfarin (COUMADIN) tablet 4 mg  Every Mon, Wed, Fri, Sun         01/11/23 0835     Reason for no Mechanical VTE Prophylaxis  Once        Question:  Reasons:  Answer:  Physician Provided (leave comment)  Comment:  on coumadin    01/04/23 1319     IP VTE HIGH RISK PATIENT  Once         01/04/23 1319                Discharge Planning   CHANTAL:      Code Status: Full Code   Is the patient medically ready for discharge?:     Reason for patient still in hospital (select all that apply): Laboratory test and PT / OT recommendations  Discharge Plan A: Home with family, Home Health                  Jorge Buckley DO  Department of Hospital Medicine   [facility]

## 2023-01-14 LAB
GLUCOSE SERPL-MCNC: 174 MG/DL (ref 70–105)
GLUCOSE SERPL-MCNC: 206 MG/DL (ref 70–105)
GLUCOSE SERPL-MCNC: 231 MG/DL (ref 70–105)
GLUCOSE SERPL-MCNC: 266 MG/DL (ref 70–105)
INR BLD: 3.53
PROTHROMBIN TIME: 35.1 SECONDS (ref 11.7–14.7)

## 2023-01-14 PROCEDURE — 85610 PROTHROMBIN TIME: CPT | Performed by: NURSE PRACTITIONER

## 2023-01-14 PROCEDURE — 27000221 HC OXYGEN, UP TO 24 HOURS

## 2023-01-14 PROCEDURE — 11000004 HC SNF PRIVATE

## 2023-01-14 PROCEDURE — 63600175 PHARM REV CODE 636 W HCPCS: Performed by: NURSE PRACTITIONER

## 2023-01-14 PROCEDURE — 82962 GLUCOSE BLOOD TEST: CPT

## 2023-01-14 PROCEDURE — 94761 N-INVAS EAR/PLS OXIMETRY MLT: CPT

## 2023-01-14 PROCEDURE — 25000003 PHARM REV CODE 250: Performed by: NURSE PRACTITIONER

## 2023-01-14 RX ADMIN — AMIODARONE HYDROCHLORIDE 200 MG: 200 TABLET ORAL at 08:01

## 2023-01-14 RX ADMIN — LEVOTHYROXINE SODIUM 25 MCG: 0.03 TABLET ORAL at 06:01

## 2023-01-14 RX ADMIN — ATORVASTATIN CALCIUM 40 MG: 40 TABLET, FILM COATED ORAL at 09:01

## 2023-01-14 RX ADMIN — INSULIN ASPART 1 UNITS: 100 INJECTION, SOLUTION INTRAVENOUS; SUBCUTANEOUS at 09:01

## 2023-01-14 RX ADMIN — DIGOXIN 0.12 MG: 125 TABLET ORAL at 08:01

## 2023-01-14 RX ADMIN — POLYETHYLENE GLYCOL 3350 17 G: 17 POWDER, FOR SOLUTION ORAL at 08:01

## 2023-01-14 RX ADMIN — OXYCODONE HYDROCHLORIDE 10 MG: 10 TABLET ORAL at 09:01

## 2023-01-14 RX ADMIN — FUROSEMIDE 40 MG: 40 TABLET ORAL at 08:01

## 2023-01-14 RX ADMIN — INSULIN ASPART 2 UNITS: 100 INJECTION, SOLUTION INTRAVENOUS; SUBCUTANEOUS at 06:01

## 2023-01-14 RX ADMIN — AMIODARONE HYDROCHLORIDE 200 MG: 200 TABLET ORAL at 09:01

## 2023-01-14 RX ADMIN — OXYCODONE HYDROCHLORIDE 10 MG: 10 TABLET ORAL at 04:01

## 2023-01-14 RX ADMIN — INSULIN ASPART 3 UNITS: 100 INJECTION, SOLUTION INTRAVENOUS; SUBCUTANEOUS at 10:01

## 2023-01-14 RX ADMIN — METFORMIN HYDROCHLORIDE 1000 MG: 500 TABLET, FILM COATED ORAL at 08:01

## 2023-01-14 RX ADMIN — OXYCODONE HYDROCHLORIDE 10 MG: 10 TABLET ORAL at 12:01

## 2023-01-14 RX ADMIN — PREDNISONE 5 MG: 5 TABLET ORAL at 08:01

## 2023-01-14 NOTE — PLAN OF CARE
Problem: Gas Exchange Impaired  Goal: Optimal Gas Exchange  Outcome: Ongoing, Progressing     Problem: Adjustment to Illness COPD (Chronic Obstructive Pulmonary Disease)  Goal: Optimal Chronic Illness Coping  Outcome: Ongoing, Progressing     Problem: Functional Ability Impaired COPD (Chronic Obstructive Pulmonary Disease)  Goal: Optimal Level of Functional Prospect  Outcome: Ongoing, Progressing     Problem: Infection COPD (Chronic Obstructive Pulmonary Disease)  Goal: Absence of Infection Signs and Symptoms  Outcome: Ongoing, Progressing     Problem: Oral Intake Inadequate COPD (Chronic Obstructive Pulmonary Disease)  Goal: Improved Nutrition Intake  Outcome: Ongoing, Progressing     Problem: Respiratory Compromise COPD (Chronic Obstructive Pulmonary Disease)  Goal: Effective Oxygenation and Ventilation  Outcome: Ongoing, Progressing

## 2023-01-15 LAB
GLUCOSE SERPL-MCNC: 151 MG/DL (ref 70–105)
GLUCOSE SERPL-MCNC: 164 MG/DL (ref 70–105)
GLUCOSE SERPL-MCNC: 172 MG/DL (ref 70–105)
GLUCOSE SERPL-MCNC: 287 MG/DL (ref 70–105)
INR BLD: 2.68
PROTHROMBIN TIME: 28.4 SECONDS (ref 11.7–14.7)

## 2023-01-15 PROCEDURE — 85610 PROTHROMBIN TIME: CPT | Performed by: NURSE PRACTITIONER

## 2023-01-15 PROCEDURE — 25000003 PHARM REV CODE 250: Performed by: NURSE PRACTITIONER

## 2023-01-15 PROCEDURE — 94761 N-INVAS EAR/PLS OXIMETRY MLT: CPT

## 2023-01-15 PROCEDURE — 11000004 HC SNF PRIVATE

## 2023-01-15 PROCEDURE — 25000003 PHARM REV CODE 250: Performed by: HOSPITALIST

## 2023-01-15 PROCEDURE — 82962 GLUCOSE BLOOD TEST: CPT

## 2023-01-15 PROCEDURE — 36415 COLL VENOUS BLD VENIPUNCTURE: CPT | Performed by: NURSE PRACTITIONER

## 2023-01-15 PROCEDURE — 27000221 HC OXYGEN, UP TO 24 HOURS

## 2023-01-15 PROCEDURE — 63600175 PHARM REV CODE 636 W HCPCS: Performed by: NURSE PRACTITIONER

## 2023-01-15 RX ADMIN — AMIODARONE HYDROCHLORIDE 200 MG: 200 TABLET ORAL at 08:01

## 2023-01-15 RX ADMIN — DIGOXIN 0.12 MG: 125 TABLET ORAL at 08:01

## 2023-01-15 RX ADMIN — WARFARIN SODIUM 4 MG: 1 TABLET ORAL at 04:01

## 2023-01-15 RX ADMIN — AMIODARONE HYDROCHLORIDE 200 MG: 200 TABLET ORAL at 09:01

## 2023-01-15 RX ADMIN — PREDNISONE 5 MG: 5 TABLET ORAL at 08:01

## 2023-01-15 RX ADMIN — LEVOTHYROXINE SODIUM 25 MCG: 0.03 TABLET ORAL at 05:01

## 2023-01-15 RX ADMIN — ATORVASTATIN CALCIUM 40 MG: 40 TABLET, FILM COATED ORAL at 09:01

## 2023-01-15 RX ADMIN — OXYCODONE HYDROCHLORIDE 10 MG: 10 TABLET ORAL at 01:01

## 2023-01-15 RX ADMIN — Medication 6 MG: at 09:01

## 2023-01-15 RX ADMIN — SENNOSIDES 8.6 MG: 8.6 TABLET, FILM COATED ORAL at 09:01

## 2023-01-15 RX ADMIN — ONDANSETRON 4 MG: 4 TABLET, ORALLY DISINTEGRATING ORAL at 09:01

## 2023-01-15 RX ADMIN — OXYCODONE HYDROCHLORIDE 10 MG: 10 TABLET ORAL at 08:01

## 2023-01-15 RX ADMIN — POLYETHYLENE GLYCOL 3350 17 G: 17 POWDER, FOR SOLUTION ORAL at 08:01

## 2023-01-15 RX ADMIN — INSULIN ASPART 3 UNITS: 100 INJECTION, SOLUTION INTRAVENOUS; SUBCUTANEOUS at 10:01

## 2023-01-15 RX ADMIN — FUROSEMIDE 40 MG: 40 TABLET ORAL at 08:01

## 2023-01-15 RX ADMIN — METFORMIN HYDROCHLORIDE 1000 MG: 500 TABLET, FILM COATED ORAL at 08:01

## 2023-01-15 RX ADMIN — OXYCODONE HYDROCHLORIDE 10 MG: 10 TABLET ORAL at 03:01

## 2023-01-15 RX ADMIN — OXYCODONE HYDROCHLORIDE 10 MG: 10 TABLET ORAL at 09:01

## 2023-01-16 LAB
GLUCOSE SERPL-MCNC: 130 MG/DL (ref 70–105)
GLUCOSE SERPL-MCNC: 174 MG/DL (ref 70–105)
GLUCOSE SERPL-MCNC: 186 MG/DL (ref 70–105)
GLUCOSE SERPL-MCNC: 192 MG/DL (ref 70–105)
INR BLD: 2.86
PROTHROMBIN TIME: 29.8 SECONDS (ref 11.7–14.7)

## 2023-01-16 PROCEDURE — 97116 GAIT TRAINING THERAPY: CPT

## 2023-01-16 PROCEDURE — 82962 GLUCOSE BLOOD TEST: CPT

## 2023-01-16 PROCEDURE — 99900035 HC TECH TIME PER 15 MIN (STAT)

## 2023-01-16 PROCEDURE — 25000003 PHARM REV CODE 250: Performed by: HOSPITALIST

## 2023-01-16 PROCEDURE — 36415 COLL VENOUS BLD VENIPUNCTURE: CPT | Performed by: NURSE PRACTITIONER

## 2023-01-16 PROCEDURE — 85610 PROTHROMBIN TIME: CPT | Performed by: NURSE PRACTITIONER

## 2023-01-16 PROCEDURE — 63600175 PHARM REV CODE 636 W HCPCS: Performed by: NURSE PRACTITIONER

## 2023-01-16 PROCEDURE — 11000004 HC SNF PRIVATE

## 2023-01-16 PROCEDURE — 94761 N-INVAS EAR/PLS OXIMETRY MLT: CPT

## 2023-01-16 PROCEDURE — 25000003 PHARM REV CODE 250: Performed by: NURSE PRACTITIONER

## 2023-01-16 PROCEDURE — 97110 THERAPEUTIC EXERCISES: CPT

## 2023-01-16 PROCEDURE — 27000221 HC OXYGEN, UP TO 24 HOURS

## 2023-01-16 RX ADMIN — FUROSEMIDE 40 MG: 40 TABLET ORAL at 08:01

## 2023-01-16 RX ADMIN — OXYCODONE HYDROCHLORIDE 10 MG: 10 TABLET ORAL at 02:01

## 2023-01-16 RX ADMIN — OXYCODONE HYDROCHLORIDE 10 MG: 10 TABLET ORAL at 08:01

## 2023-01-16 RX ADMIN — ATORVASTATIN CALCIUM 40 MG: 40 TABLET, FILM COATED ORAL at 08:01

## 2023-01-16 RX ADMIN — OXYCODONE HYDROCHLORIDE 10 MG: 10 TABLET ORAL at 03:01

## 2023-01-16 RX ADMIN — POLYETHYLENE GLYCOL 3350 17 G: 17 POWDER, FOR SOLUTION ORAL at 08:01

## 2023-01-16 RX ADMIN — Medication 6 MG: at 08:01

## 2023-01-16 RX ADMIN — DIGOXIN 0.12 MG: 125 TABLET ORAL at 08:01

## 2023-01-16 RX ADMIN — PREDNISONE 5 MG: 5 TABLET ORAL at 08:01

## 2023-01-16 RX ADMIN — WARFARIN SODIUM 4 MG: 1 TABLET ORAL at 04:01

## 2023-01-16 RX ADMIN — AMIODARONE HYDROCHLORIDE 200 MG: 200 TABLET ORAL at 08:01

## 2023-01-16 RX ADMIN — SENNOSIDES 8.6 MG: 8.6 TABLET, FILM COATED ORAL at 08:01

## 2023-01-16 RX ADMIN — LEVOTHYROXINE SODIUM 25 MCG: 0.03 TABLET ORAL at 05:01

## 2023-01-16 RX ADMIN — METFORMIN HYDROCHLORIDE 1000 MG: 500 TABLET, FILM COATED ORAL at 08:01

## 2023-01-16 NOTE — PT/OT/SLP PROGRESS
"Physical Therapy Treatment    Patient Name:  Karina Rojas   MRN:  [id number]    Recommendations:     Discharge Recommendations: home with home health  Discharge Equipment Recommendations: none  Barriers to discharge: None    Assessment:     Karina Rojas is a 82 y.o. female admitted with a medical diagnosis of S/P total right hip arthroplasty.  She presents with the following impairments/functional limitations: weakness, impaired endurance, gait instability, impaired balance, pain, impaired cardiopulmonary response to activity . Pt has met all STG/LTG set for her. Therefore, goals updated to reflect new status.    Rehab Prognosis: Good; patient would benefit from acute skilled PT services to address these deficits and reach maximum level of function.    Recent Surgery: * No surgery found *      Plan:     During this hospitalization, patient to be seen BID to address the identified rehab impairments via gait training, therapeutic activities, therapeutic exercises and progress toward the following goals:    Plan of Care Expires:  02/03/23    Subjective     Chief Complaint: Pt requests to only walk this morning. States walking is her main concern. States, "The ex's aren't helping me and they tire me out before I can even walk." Pt explained that with her right hip fx, she needs to cont with strengthening ex's because 3 out of 4 of her balance muscles cross the hip. When she fx'd her hip, it weakened them which directly effects her balance. Pt assured that PT can have her walk before ex's, though. Pt agreeable to going some ex's this afternoon. Pt states she wants to dc home and that her dtr and home health can help her. PT recommend pt discuss this with her MD. After she walked twice, pt states she was too tired and in too much pain to do ex's.  Patient/Family Comments/goals: Pt to have dtr and home health assist when dc'd home.  Pain/Comfort:  Pain Rating 1: 6/10  Location - Side 1: Bilateral  Location - " Orientation 1: midline  Location 1: back  Pain Addressed 1: Pre-medicate for activity, Cessation of Activity  Pain Rating Post-Intervention 1: 6/10  Pain Rating 2: 0/10      Objective:     Communicated with pt prior to session. Pt requests to walk. Patient found up in chair with oxygen upon PT entry to room.     General Precautions: Standard, fall  Orthopedic Precautions: RLE weight bearing as tolerated  Braces: N/A  Respiratory Status: Nasal cannula, flow 2 L/min     Functional Mobility:  Bed Mobility:     Rolling Left:  modified independence  Rolling Right: modified independence  Scooting: independence  Supine to Sit: modified independence  Sit to Supine: modified independence  Transfers:     Sit to Stand:  supervision with rolling walker  Bed to Chair: stand by assistance with  4 wheeled walker  using  Stand Pivot  Gait: Pt amb'd with rollator/O2 58 ft x 2 with SBA. Pt sat on rollator seat for rest break in b/w walks.      AM-PAC 6 CLICK MOBILITY  Turning over in bed (including adjusting bedclothes, sheets and blankets)?: 4  Sitting down on and standing up from a chair with arms (e.g., wheelchair, bedside commode, etc.): 4  Moving from lying on back to sitting on the side of the bed?: 4  Moving to and from a bed to a chair (including a wheelchair)?: 3  Need to walk in hospital room?: 3  Climbing 3-5 steps with a railing?: 1  Basic Mobility Total Score: 19       Treatment & Education:  Pt amb'd 58 ft x 2 with SBA using rollator/O2 with seated rest break in b/w. Pt states she was too exhausted and back hurt too much to do any ex's, but stated she would this afternoon during her second PT session. Pt left sitting on EOB and O2 connected to wall.    Patient left sitting edge of bed with all lines intact..    GOALS:   Multidisciplinary Problems       Physical Therapy Goals          Problem: Physical Therapy    Goal Priority Disciplines Outcome Goal Variances Interventions   Physical Therapy Goal     PT, PT/OT Ongoing,  Progressing     Description: STG - 2 weeks  1. Pt will perform sit to/from stand and bed transfer with min A x 1.-MET  2. Pt will transfer supine to/from sit with min A x 1.-PROGRESSING  3. Pt will amb with RW/O2 25 ft with min A x 1.-PROGRESSING      LTG - 4 weeks  1. Pt will perform sit to/from stand and bed transfer with SBA  2. Pt will transfer supine to/from sit with SBA.  3. Pt will amb with RW/O2 25 ft with SBA.                           Time Tracking:     PT Received On: 01/16/23  PT Start Time: 1031     PT Stop Time: 1055  PT Total Time (min): 24 min     Billable Minutes: Gait Training 23, Therapeutic Activity 1, and Total Time 24 min    Treatment Type: Treatment  PT/PTA: PT     PTA Visit Number: 0     01/16/2023

## 2023-01-16 NOTE — PT/OT/SLP PROGRESS
Physical Therapy Treatment    Patient Name:  Karina Rojas   MRN:  [id number]    Recommendations:     Discharge Recommendations: home with home health  Discharge Equipment Recommendations: none  Barriers to discharge: None    Assessment:     Karina Rojas is a 82 y.o. female admitted with a medical diagnosis of S/P total right hip arthroplasty.  She presents with the following impairments/functional limitations: pain, weakness, impaired endurance, gait instability, impaired balance, orthopedic precautions . Pt issued HEP/crow tband this visit and instructed in it.     Rehab Prognosis: Good; patient would benefit from acute skilled PT services to address these deficits and reach maximum level of function.    Recent Surgery: * No surgery found *      Plan:     During this hospitalization, patient to be seen BID to address the identified rehab impairments via gait training, therapeutic activities, therapeutic exercises and progress toward the following goals:    Plan of Care Expires:  02/03/23    Subjective     Chief Complaint: Pt agreeable to ther ex this afternoon.  Patient/Family Comments/goals: Pt requests to dc home this week.  Pain/Comfort:  Pain Rating 1: 5/10  Location - Side 1: Bilateral  Location - Orientation 1: midline  Location 1: back  Pain Addressed 1: Pre-medicate for activity, Reposition, Cessation of Activity  Pain Rating Post-Intervention 1: 5/10  Pain Rating 2: 0/10      Objective:     Communicated with pt prior to session.  Patient found HOB elevated with oxygen upon PT entry to room. PT called RT Darrel and requested pt be issued longer O2 tubing so pt can begin walking to the bathroom with staff instead of using BSC.    General Precautions: Standard, fall, respiratory  Orthopedic Precautions: RLE weight bearing as tolerated  Braces: N/A  Respiratory Status: Nasal cannula, flow 2 L/min     Functional Mobility:  Bed Mobility:     Rolling Right: modified independence  Scooting:  "independence  Supine to Sit: modified independence      AM-PAC 6 CLICK MOBILITY  Turning over in bed (including adjusting bedclothes, sheets and blankets)?: 4  Sitting down on and standing up from a chair with arms (e.g., wheelchair, bedside commode, etc.): 4  Moving from lying on back to sitting on the side of the bed?: 4  Moving to and from a bed to a chair (including a wheelchair)?: 3  Need to walk in hospital room?: 3  Climbing 3-5 steps with a railing?: 1  Basic Mobility Total Score: 19       Treatment & Education:  Pt instructed in issued HEP which she performed with min VC RLE: resisted hip abd in sitting 2x10 using red tband, resisted hip add 1x20 using folded pillow, seated right hip flex 2x10, GS 3" hold 1x20. Pt issued piece of red tband along with written/illustrated HEP.    Patient left sitting edge of bed with all lines intact..    GOALS:   Multidisciplinary Problems       Physical Therapy Goals          Problem: Physical Therapy    Goal Priority Disciplines Outcome Goal Variances Interventions   Physical Therapy Goal     PT, PT/OT Ongoing, Progressing     Description: STG - 2 weeks  1. Pt will perform sit to/from stand and bed transfer with min A x 1.-MET  2. Pt will transfer supine to/from sit with min A x 1.-MET  3. Pt will amb with RW/O2 25 ft with min A x 1.-MET      LTG - 4 weeks  1. Pt will perform sit to/from stand and bed transfer with SBA-MET  2. Pt will transfer supine to/from sit with SBA.-MET  3. Pt will amb with RW/O2 25 ft with SBA.-MET  4. Pt will be svn with bed transfer.PROGRESSING  5. Pt indep with HEP.-PROGRESSING                             Time Tracking:     PT Received On: 01/16/23  PT Start Time: 1441     PT Stop Time: 1456  PT Total Time (min): 15 min     Billable Minutes: Therapeutic Exercise 15 MIN    Treatment Type: Treatment  PT/PTA: PT     PTA Visit Number: 0     01/16/2023  "

## 2023-01-16 NOTE — NURSING
Nurses Note -- 4 Eyes      1/15/2023   7:36 PM      Skin assessed during: Q Shift Change      [] No Pressure Injuries Present    []Prevention Measures Documented      [] Yes- Altered Skin Integrity Present or Discovered   [] LDA Added if Not in Epic (Describe Wound)   [x] New Altered Skin Integrity was Present on Admit and Documented in LDA   [] Wound Image Taken    Wound Care Consulted? No    Attending Nurse:  Teofilo Lizarraga LPN     Second RN/Staff Member    Rosangela Carter RN

## 2023-01-16 NOTE — PLAN OF CARE
Problem: Physical Therapy  Goal: Physical Therapy Goal  Description: STG - 2 weeks  1. Pt will perform sit to/from stand and bed transfer with min A x 1.-MET  2. Pt will transfer supine to/from sit with min A x 1.-MET  3. Pt will amb with RW/O2 25 ft with min A x 1.-MET      LTG - 4 weeks  1. Pt will perform sit to/from stand and bed transfer with SBA-MET  2. Pt will transfer supine to/from sit with SBA.-MET  3. Pt will amb with RW/O2 25 ft with SBA.-MET  4. Pt will be svn with bed transfer.PROGRESSING  5. Pt indep with HEP.-PROGRESSING    Goals updated to reflect new status.    Outcome: Ongoing, Progressing

## 2023-01-16 NOTE — PROGRESS NOTES
"[facility]  Adult Nutrition  Follow Up Note         Reason for Assessment  Reason For Assessment: consult (Assess/educate regarding nutritional needs)  Nutrition Risk Screen: no indicators present    Follow Up.     Recommend continue current diet order as medically appropriate and tolerated. Encourage good PO intakes. Recommend Glucerna oral nutrition supplement TID to promote adequate oral intake    Per MD HPI:  "Ms Rojas is an 83 y/o female admitted to [facility] for rehabilitation services following right hip fracture secondary to fall requiring Right hip arthroplasty. She initially presented to [facility] ED on 12/28 after trip and fall in home where she was found to have an acute comminuted and minimally displaced right intertrochanteric fracture. She was transferred to [facility] for ortho intervention. She underwent surgical repair of right hip on 12/30. Non-removable surgical dressing to be removed 10 days post op (1/9). Staples to be removed 14 days post op (1/13) and steri strips applied. Ortho follow up in 3 weeks. Prior to fall, she was ambulatory with walker dependence. She is on chronic O2 @ 2L NC with hx of COPD. She will be admitted for PT/OT rehabilitation with hopes to return home when goals are met."    Per MD 1/9 note,  "1/9 Follow up today, in bed, c/o back pain which is chronic.  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  Currently on Lovenox/Coumadin bridge.  Had some leg warmth."    Patient currently receiving Diabetic Diet with varied intake %. PO intake hx:  1/10: 0 + 100%  1/9: 50 + 75%  1/8: 50 + 100%  1/7: 25 + 75%  1/6: 25 + 100%  1/5: 50%  1/4: 25%  Encourage good PO intakes. Recommend Glucerna oral nutrition supplement TID to promote adequate oral intakes. Glucerna provides 220kcal and 10g protein each.     CBW 67.9g within IBW range, BMI normal.     Last BM 1/16 per flowsheets. Will continue to monitor PO intakes, weight trend, meds, labs, updates in " patient condition. RD following.    Malnutrition  Is Patient Malnourished: No    Nutrition Diagnosis  Altered Nutrition Related Lab Values   related to Diabetes Mellitus as evidenced by elevated GLU levels    Nutrition Diagnosis Status: Chronic/ continues    Nutrition Risk  Level of Risk/Frequency of Follow-up: moderate   Chewing or Swallowing Difficulty?: No Chewing or swallowing difficulty    Estimated/Assessed Needs    Temp: 98.2 °F (36.8 °C)Oral  Weight Used For Calorie Calculations: 74 kg (163 lb 2.3 oz)   Energy Need Method: Kcal/kg (25-30 kcal/kg) Energy Calorie Requirements (kcal): 1850-2220kcal  Weight Used For Protein Calculations: 74 kg (163 lb 2.3 oz)  Protein Requirements: 74-89g pro (1.0-1.2g pro/kg)  Estimated Fluid Requirement Method: RDA Method    RDA Method (mL): 1850     Nutrition Prescription / Recommendations  Recommendation/Intervention: Recommend continue current diet order as medically appropriate and tolerated. Encourage good PO intakes. Recommend Glucerna oral nutrition supplement TID to promote adequate oral intake.  Goals: PO intake %, weight maintenance  Nutrition Goal Status: new  Current Diet Order: Diabetic Diet  Nutrition Order Comments: Appropriate  Recommended Diet: Consistent Carbohydrate 2000 (75g Carbs)  Recommended Oral Supplement: Glucerna Shake [220 kcals, 10g Protein, 26g Carbs(4g Fiber, 7g Sugar), 9g Fat] three times daily  Is Nutrition Support Recommended: No  Is Education Recommended: No    Monitor and Evaluation  % current Intake: Other: PO intake 0-100%  % intake to meet estimated needs: 75 - 100 %  Food and Nutrient Intake: energy intake, food and beverage intake  Food and Nutrient Adminstration: diet order  Knowledge/Beliefs/Attitudes: food and nutrition knowledge/skill, beliefs and attitudes  Physical Activity and Function: nutrition-related ADLs and IADLs, factors affecting access to physical activity  Anthropometric Measurements: weight, weight change, body  "mass index  Biochemical Data, Medical Tests and Procedures: electrolyte and renal panel, glucose/endocrine profile, gastrointestinal profile, inflammatory profile, lipid profile  Nutrition-Focused Physical Findings: overall appearance, extremities, muscles and bones, head and eyes, skin     Current Medical Diagnosis and Past Medical History  Diagnosis: other (see comments) (s/p total right hip arthroplasty)  Past Medical History:   Diagnosis Date    COPD (chronic obstructive pulmonary disease)     Diabetes mellitus     DVT (deep venous thrombosis)     High cholesterol     Hypertension     Paroxysmal atrial fibrillation      Nutrition/Diet History  Spiritual, Cultural Beliefs, Jewish Practices, Values that Affect Care: no  Food Allergies: NKFA    Lab/Procedures/Meds  No results for input(s): NA, K, BUN, CREATININE, GLU, CALCIUM, ALBUMIN, CL, ALT, AST, PHOS in the last 72 hours.  Last A1c:   Lab Results   Component Value Date    HGBA1C 7.5 (H) 12/29/2022     Lab Results   Component Value Date    RBC 3.74 (L) 01/13/2023    HGB 10.8 (L) 01/13/2023    HCT 36.2 (L) 01/13/2023    MCV 96.8 (H) 01/13/2023    MCH 28.9 01/13/2023    MCHC 29.8 (L) 01/13/2023     Pertinent Labs Reviewed: reviewed  Cl 96 (L) - replete to WNL as needed, CO2 39 (H) - unsure of etiology, BUN 19 (H), creatinine 1.08 (H), eGFR 52 (L) - pt with no PMH of CKD, will continue to monitor renal labs,  (H) - pt with PMH of DM  Pertinent Medications Reviewed: reviewed  Amiodarone, atorvastatin, digoxin, furosemide, levothyroxine, metformin, nitrofurantoin, polyethylene glycol, prednisone, warfarin, insulin PRN, ondansetron PRN, oxycodone PRN    Anthropometrics  Temp: 98.2 °F (36.8 °C)  Height: 5' 7.99" (172.7 cm)  Height (inches): 67.99 in  Weight Method: Standard Scale  Weight: 67.9 kg (149 lb 11.1 oz)  Weight (lb): 149.69 lb  Ideal Body Weight (IBW), Female: 139.95 lb  % Ideal Body Weight, Female (lb): 117.18 %  BMI (Calculated): 22.8   "   Nutrition by Nursing  Diet/Nutrition Received: consistent carb/diabetic diet  Intake (%): 50%  Diet/Feeding Assistance: none  Diet/Feeding Tolerance: good  Last Bowel Movement: 01/16/23    Nutrition Follow-Up  RD Follow-up?: Yes

## 2023-01-17 VITALS
OXYGEN SATURATION: 98 % | HEART RATE: 59 BPM | HEIGHT: 68 IN | DIASTOLIC BLOOD PRESSURE: 45 MMHG | RESPIRATION RATE: 18 BRPM | WEIGHT: 150.81 LBS | TEMPERATURE: 98 F | SYSTOLIC BLOOD PRESSURE: 117 MMHG | BODY MASS INDEX: 22.86 KG/M2

## 2023-01-17 PROBLEM — Z78.9 PRESENCE OF SURGICAL INCISION: Status: RESOLVED | Noted: 2023-01-04 | Resolved: 2023-01-17

## 2023-01-17 PROBLEM — J44.1 COPD EXACERBATION: Status: RESOLVED | Noted: 2023-01-04 | Resolved: 2023-01-17

## 2023-01-17 PROBLEM — Z96.641 S/P TOTAL RIGHT HIP ARTHROPLASTY: Status: RESOLVED | Noted: 2023-01-04 | Resolved: 2023-01-17

## 2023-01-17 PROBLEM — N30.00 ACUTE CYSTITIS WITHOUT HEMATURIA: Status: RESOLVED | Noted: 2023-01-06 | Resolved: 2023-01-17

## 2023-01-17 LAB
GLUCOSE SERPL-MCNC: 127 MG/DL (ref 70–105)
GLUCOSE SERPL-MCNC: 249 MG/DL (ref 70–105)
INR BLD: 2.68
PROTHROMBIN TIME: 28.4 SECONDS (ref 11.7–14.7)

## 2023-01-17 PROCEDURE — 85610 PROTHROMBIN TIME: CPT | Performed by: NURSE PRACTITIONER

## 2023-01-17 PROCEDURE — 99316 NF DSCHRG MGMT 30 MIN+: CPT | Mod: ,,, | Performed by: HOSPITALIST

## 2023-01-17 PROCEDURE — 25000003 PHARM REV CODE 250: Performed by: NURSE PRACTITIONER

## 2023-01-17 PROCEDURE — 94761 N-INVAS EAR/PLS OXIMETRY MLT: CPT

## 2023-01-17 PROCEDURE — 27000221 HC OXYGEN, UP TO 24 HOURS

## 2023-01-17 PROCEDURE — 99316 PR NURSING FAC DISCHRGE DAY,MORE 30 MIN: ICD-10-PCS | Mod: ,,, | Performed by: HOSPITALIST

## 2023-01-17 PROCEDURE — 97535 SELF CARE MNGMENT TRAINING: CPT

## 2023-01-17 PROCEDURE — 82962 GLUCOSE BLOOD TEST: CPT

## 2023-01-17 PROCEDURE — 63600175 PHARM REV CODE 636 W HCPCS: Performed by: NURSE PRACTITIONER

## 2023-01-17 PROCEDURE — 36415 COLL VENOUS BLD VENIPUNCTURE: CPT | Performed by: NURSE PRACTITIONER

## 2023-01-17 PROCEDURE — 97116 GAIT TRAINING THERAPY: CPT | Mod: CQ

## 2023-01-17 PROCEDURE — 99900035 HC TECH TIME PER 15 MIN (STAT)

## 2023-01-17 RX ORDER — LEVOTHYROXINE SODIUM 50 UG/1
50 TABLET ORAL
Status: DISCONTINUED | OUTPATIENT
Start: 2023-01-18 | End: 2023-01-17 | Stop reason: HOSPADM

## 2023-01-17 RX ORDER — LEVOTHYROXINE SODIUM 50 UG/1
50 TABLET ORAL
Qty: 30 TABLET | Refills: 11 | Status: SHIPPED | OUTPATIENT
Start: 2023-01-18 | End: 2024-01-18

## 2023-01-17 RX ADMIN — POLYETHYLENE GLYCOL 3350 17 G: 17 POWDER, FOR SOLUTION ORAL at 08:01

## 2023-01-17 RX ADMIN — AMIODARONE HYDROCHLORIDE 200 MG: 200 TABLET ORAL at 08:01

## 2023-01-17 RX ADMIN — PREDNISONE 5 MG: 5 TABLET ORAL at 08:01

## 2023-01-17 RX ADMIN — OXYCODONE HYDROCHLORIDE 10 MG: 10 TABLET ORAL at 01:01

## 2023-01-17 RX ADMIN — INSULIN ASPART 2 UNITS: 100 INJECTION, SOLUTION INTRAVENOUS; SUBCUTANEOUS at 10:01

## 2023-01-17 RX ADMIN — OXYCODONE HYDROCHLORIDE 10 MG: 10 TABLET ORAL at 09:01

## 2023-01-17 RX ADMIN — METFORMIN HYDROCHLORIDE 1000 MG: 500 TABLET, FILM COATED ORAL at 08:01

## 2023-01-17 RX ADMIN — OXYCODONE HYDROCHLORIDE 10 MG: 10 TABLET ORAL at 05:01

## 2023-01-17 RX ADMIN — LEVOTHYROXINE SODIUM 25 MCG: 0.03 TABLET ORAL at 05:01

## 2023-01-17 RX ADMIN — FUROSEMIDE 40 MG: 40 TABLET ORAL at 08:01

## 2023-01-17 RX ADMIN — DIGOXIN 0.12 MG: 125 TABLET ORAL at 08:01

## 2023-01-17 NOTE — PROGRESS NOTES
Current Warfarin Dose: 4mg [facility]  Last Dose Change: 01-  Indication for Anticoagulation: hx DVT, a fib  Goal INR: 2.5 (range 2-3)  Current INR: 2.68 (3rd consecutive within goal range)  INR changed to weekly, next lab 01-  Pharmacy will continue to monitor.    Mike Nunez, Pharm. D.  Director of Pharmacy  [facility]  [phone]  [email or web address]

## 2023-01-17 NOTE — PT/OT/SLP PROGRESS
Physical Therapy Treatment    Patient Name:  Karina Rojas   MRN:  [id number]    Recommendations:     Discharge Recommendations: home with home health  Discharge Equipment Recommendations: none  Barriers to discharge: None    Assessment:     Karina Rojas is a 82 y.o. female admitted with a medical diagnosis of S/P total right hip arthroplasty.  She presents with the following impairments/functional limitations: weakness, impaired endurance, impaired functional mobility, gait instability, pain, impaired balance, orthopedic precautions. Pt requests to perform gait training this morning. Patient required one seated rest break in between gait trials due to fatigue. Patient stated that her back and R hip were hurting, and that she felt tired after completion of treatment.    Rehab Prognosis: Good; patient would benefit from acute skilled PT services to address these deficits and reach maximum level of function.    Recent Surgery: * No surgery found *      Plan:     During this hospitalization, patient to be seen BID to address the identified rehab impairments via gait training, therapeutic activities, therapeutic exercises and progress toward the following goals:    Plan of Care Expires:  02/03/23    Subjective     Chief Complaint: Pt complains of back pain, L hip pain, and fatigue.  Patient/Family Comments/goals: Pt to have dtr and home health assist when dc'd home.  Pain/Comfort:  Pain Rating 1: 5/10  Location - Side 1: Bilateral  Location - Orientation 1: generalized  Location 1: back (and hip)  Pain Addressed 1: Pre-medicate for activity      Objective:     Communicated with pt prior to session. Patient found HOB elevated with oxygen upon PTA entry to room.     General Precautions: Standard, fall, respiratory  Orthopedic Precautions: RLE weight bearing as tolerated  Braces: N/A  Respiratory Status: Nasal cannula, flow 2 L/min     Functional Mobility:  Bed Mobility:     Rolling Right: supervision  Supine to Sit:  supervision  Sit to Supine: supervision  Transfers:     Sit to Stand:  stand by assistance and contact guard assistance with 4 wheeled walker  Gait: Pt amb'd with rollator/O2 65 ft x 2 with CGA. Pt sat on rollator seat for rest break in between walks.      AM-PAC 6 CLICK MOBILITY  Turning over in bed (including adjusting bedclothes, sheets and blankets)?: 4  Sitting down on and standing up from a chair with arms (e.g., wheelchair, bedside commode, etc.): 4  Moving from lying on back to sitting on the side of the bed?: 4  Moving to and from a bed to a chair (including a wheelchair)?: 4  Need to walk in hospital room?: 3  Climbing 3-5 steps with a railing?: 1  Basic Mobility Total Score: 20       Treatment & Education:  Pt amb'd 65 ft x 2 with SBA using rollator/O2 with seated rest break in b/w.   Patient left HOB elevated with all lines intact and call button in reach.    GOALS:   Multidisciplinary Problems       Physical Therapy Goals          Problem: Physical Therapy    Goal Priority Disciplines Outcome Goal Variances Interventions   Physical Therapy Goal     PT, PT/OT Ongoing, Progressing     Description: STG - 2 weeks  1. Pt will perform sit to/from stand and bed transfer with min A x 1.-MET  2. Pt will transfer supine to/from sit with min A x 1.-MET  3. Pt will amb with RW/O2 25 ft with min A x 1.-MET      LTG - 4 weeks  1. Pt will perform sit to/from stand and bed transfer with SBA-MET  2. Pt will transfer supine to/from sit with SBA.-MET  3. Pt will amb with RW/O2 25 ft with SBA.-MET  4. Pt will be svn with bed transfer.PROGRESSING  5. Pt indep with HEP.-PROGRESSING                             Time Tracking:     PT Received On: 01/17/23  PT Start Time: 1122     PT Stop Time: 1142  PT Total Time (min): 20 min     Billable Minutes: Gait Training 18 and Therapeutic Activity 2    Treatment Type: Treatment  PT/PTA: PTA     PTA Visit Number: 1     01/17/2023

## 2023-01-17 NOTE — PLAN OF CARE
[facility] - [facility] Facility  Patient: Karina Rojas     Interdisciplinary Team Meeting     Today's Date: 1/17/2023     Estimated D/C Date: TBD       Primary Physician:  Thalia Bowie    Pharmacy: [facility] Unit Director: Sowmya Valdez   Care Management: Andres Middleton Physical/Occupational Therapy: Charlotte Lizarraga/So Alford   Speech Therapy: Yanni Richard Activity Therapy: Angelica Araujo    Dietician: Jaci Sanabria  Pharmacist: Mike Nunez  Respiratory therapist: Tere Adame     Nurse  New Symptoms/Problems: none  Last BM: 1/16/2023 Urine: continent Diarrhea: No   Constipated: No Bowel: continent Davidson: no   Isolation: No     O2: 2L NC     Nutrition: diabetic diet with Glucerna supplement  Speech/Swallowing: not following  Aspiration Precautions: No  Cognition: alert and oriented to person place time and situations    Physical Therapy  Gait: 58ft with rollator and stand by assist ELOS: TBD   Transfers: mod independent Range of Motion/Restrictions: RLE weight bearing as tolerated     Occupational Therapy   Eating/Grooming: independent   Toileting: contact guard assist Bathing: independent   Dressing (Upper Body): independent Dressing (Lower Body): min assist       Tx Plan/Recommendations reviewed with family and patient on 1/17/23  Additional family Conference/Training: none  D/C Plan/Recommendations: dc home with home health    Pharmacy  Medication Changes (see MD orders in chart): Yes  MD/NP: Jorge Buckley Labs Reviewed: 1/17 New Lab Orders: none     MD/NP Signature: Time:

## 2023-01-17 NOTE — PLAN OF CARE
Problem: Occupational Therapy  Goal: Occupational Therapy Goal  Description: STG: within 2 weeks  Pt will perform grooming with setup at sinkside MET  Pt will bathe with min a at bedside MET  Pt will perform UE dressing with SVN bedside MET  Pt will perform LE dressing with min a to SBA with AE as needed  ONGOING/PROGRESSING  Pt will sit EOB x 15 min with no assistance MET  Pt will transfer bed/chair/bsc with mod a MET and exceeded  Pt will perform standing task x 3 min with cga/sba assistance MET  Pt will tolerate 45 minutes of tx without fatigue ONGOING/PROGRESSING      LT.Restore to max I with self care and mobility.    Patient is adamantly stating she is ready to go home with home health and her daughter's svn.     Outcome: Ongoing, Progressing

## 2023-01-17 NOTE — PROGRESS NOTES
Clinical Pharmacy Chart Review Note      Admit Date: 1/4/2023   LOS: 13 days       Karina Rojas is a 82 y.o. female admitted to SNF for PT/OT after hospitalization for right hip fracture.    Active Hospital Problems    Diagnosis  POA    *S/P total right hip arthroplasty [Z96.641]  Not Applicable    Acute cystitis without hematuria [N30.00]  Yes    HTN (hypertension) [I10]  Yes    Atrial fibrillation [I48.91]  Yes    COPD exacerbation [J44.1]  Yes    Diabetes [E11.9]  Yes    Presence of surgical incision [Z78.9]  Yes      Resolved Hospital Problems   No resolved problems to display.     Review of patient's allergies indicates:   Allergen Reactions    Tylenol [acetaminophen] Anaphylaxis     Patient Active Problem List    Diagnosis Date Noted    Acute cystitis without hematuria 01/06/2023    S/P total right hip arthroplasty 01/04/2023    HTN (hypertension) 01/04/2023    Atrial fibrillation 01/04/2023    COPD exacerbation 01/04/2023    Diabetes 01/04/2023    Presence of surgical incision 01/04/2023       Scheduled Meds:    amiodarone  200 mg Oral BID    atorvastatin  40 mg Oral QHS    digoxin  0.125 mg Oral Daily    furosemide  40 mg Oral Daily    levothyroxine  25 mcg Oral Before breakfast    metFORMIN  1,000 mg Oral Daily with breakfast    polyethylene glycol  17 g Oral Daily    predniSONE  5 mg Oral Daily    warfarin  4 mg Oral Every Mon, Wed, Fri, Sun     Continuous Infusions:   PRN Meds: albuterol sulfate, aluminum-magnesium hydroxide-simethicone, dextrose 50%, dextrose 50%, glucagon (human recombinant), glucose, glucose, insulin aspart U-100, magnesium hydroxide 400 mg/5 ml, melatonin, ondansetron, oxyCODONE, senna    OBJECTIVE:     Vital Signs (Last 24H)  Temp:  [97.6 °F (36.4 °C)-98 °F (36.7 °C)]   Pulse:  [57-66]   Resp:  [18-20]   BP: (117-147)/(45-54)   SpO2:  [96 %-98 %]     Laboratory:  CBC:   Recent Labs   Lab 01/11/23  0546 01/13/23  0617   WBC 9.69 9.60   RBC 3.66* 3.74*   HGB 10.5* 10.8*   HCT 35.4*  36.2*   * 444*   MCV 96.7* 96.8*   MCH 28.7 28.9   MCHC 29.7* 29.8*     BMP:   Recent Labs   Lab 01/11/23  0546 01/13/23  0617   * 116*    140   K 4.3 4.2   CL 95* 97*   CO2 40* 36*   BUN 15 16   CREATININE 1.02 1.09*   CALCIUM 8.5 8.5     .    ASSESSMENT/PLAN:     Estimated Creatinine Clearance: 41.5 mL/min (A) (based on SCr of 1.09 mg/dL (H)).Medications reviewed, no dose adjustments needed. TSH 6.079, will recommend dose adjustment. INR 2.68, warfarin rcontinued at 4mg SMWF and INR changed to weekly.  K+ wnl. Weekly swing bed medication regimen review complete.  Will continue to monitor.  Mike Nunez, Pharm. D.  Director of Pharmacy  [facility]  [phone]  [email or web address]

## 2023-01-17 NOTE — PLAN OF CARE
Problem: Gas Exchange Impaired  Goal: Optimal Gas Exchange  Outcome: Ongoing, Progressing     Problem: Adjustment to Illness COPD (Chronic Obstructive Pulmonary Disease)  Goal: Optimal Chronic Illness Coping  Outcome: Ongoing, Progressing     Problem: Functional Ability Impaired COPD (Chronic Obstructive Pulmonary Disease)  Goal: Optimal Level of Functional [place]  Outcome: Ongoing, Progressing     Problem: Infection COPD (Chronic Obstructive Pulmonary Disease)  Goal: Absence of Infection Signs and Symptoms  Outcome: Ongoing, Progressing     Problem: Oral Intake Inadequate COPD (Chronic Obstructive Pulmonary Disease)  Goal: Improved Nutrition Intake  Outcome: Ongoing, Progressing     Problem: Respiratory Compromise COPD (Chronic Obstructive Pulmonary Disease)  Goal: Effective Oxygenation and Ventilation  Outcome: Ongoing, Progressing

## 2023-01-17 NOTE — PT/OT/SLP PROGRESS
"Occupational Therapy  Treatment    Karina Rojas   MRN: [id number]   Admitting Diagnosis: S/P total right hip arthroplasty    OT Date of Treatment: 01/17/23   OT Start Time: 0940  OT Stop Time: 0949  OT Total Time (min): 9 min    Billable Minutes:  Self Care/Home Management 9    OT/HELENA: OT          General Precautions: Standard,    Orthopedic Precautions:    Braces: N/A  Respiratory Status: Nasal cannula, flow 2 L/min         Subjective:  Communicated with pt prior to session.  Pt states, "I am past ready to go home.  I don't need to stay any longer."  Pain/Comfort  Pain Rating 1: 8/10  Location - Side 1: Bilateral  Location 1: back  Pain Addressed 1: Cessation of Activity  Pain Rating Post-Intervention 1: 5/10    Objective:        Functional Mobility:  Bed Mobility: mod i       Transfers: mod i        Functional Ambulation: uses rollator to walk across room to Bathroom with SVN    Activities of Daily Living:     Feeding adaptive equipment:  none needed     UE adaptive equipment: none needed     LE adaptive equipment: Reacher and Sock aid                     Bathing adaptive equipment: long-handled sponge    Balance:   Static Sit: GOOD: Takes MODERATE challenges from all directions  Dynamic Sit: GOOD: Maintains balance through MODERATE excursions of active trunk movement  Static Stand: FAIR+: Takes MINIMAL challenges from all directions  Dynamic stand: FAIR+: Needs CLOSE SUPERVISION during gait and is able to right self with minor LOB    Therapeutic Activities and Exercises:  OT engaged pt in self care functional mobility task as noted above to educate and promote indepedence for safety at home with svn from family.      AM-PAC 6 CLICK ADL   How much help from another person does this patient currently need?   1 = Unable, Total/Dependent Assistance  2 = A lot, Maximum/Moderate Assistance  3 = A little, Minimum/Contact Guard/Supervision  4 = None, Modified [place]/Independent    Putting on and taking off " "regular lower body clothing? : 3  Bathing (including washing, rinsing, drying)?: 4  Toileting, which includes using toilet, bedpan, or urinal? : 4  Putting on and taking off regular upper body clothing?: 4  Taking care of personal grooming such as brushing teeth?: 4  Eating meals?: 4  Daily Activity Total Score: 23     AM-PAC Raw Score CMS "G-Code Modifier Level of Impairment Assistance   6 % Total / Unable   7 - 8 CM 80 - 100% Maximal Assist   9-13 CL 60 - 80% Moderate Assist   14 - 19 CK 40 - 60% Moderate Assist   20 - 22 CJ 20 - 40% Minimal Assist   23 CI 1-20% SBA / CGA   24 CH 0% Independent/ Mod I       Patient left up in chair with call button in reach    ASSESSMENT:  Karina Rojas is a 82 y.o. female with a medical diagnosis of S/P total right hip arthroplasty and presents with significant back pain today.    Rehab identified problem list/impairments:  weakness, impaired endurance, impaired self care skills, impaired functional mobility, gait instability, impaired balance, decreased lower extremity function, pain, decreased ROM, impaired skin, impaired cardiopulmonary response to activity, orthopedic precautions    Rehab potential is good.    Activity tolerance: Good overall, though back pain is a barrier to overall progress in general; chronic back pain is not changing    Discharge recommendations:     Barriers to discharge: Barriers to Discharge: None    Equipment recommendations: hip kit    GOALS:   Multidisciplinary Problems       Occupational Therapy Goals          Problem: Occupational Therapy    Goal Priority Disciplines Outcome Interventions   Occupational Therapy Goal     OT, PT/OT Ongoing, Progressing    Description: STG: within 2 weeks  Pt will perform grooming with setup at sinkside MET  Pt will bathe with min a at bedside MET  Pt will perform UE dressing with SVN bedside MET  Pt will perform LE dressing with min a to SBA with AE as needed  ONGOING/PROGRESSING  Pt will sit EOB x 15 min " with no assistance MET  Pt will transfer bed/chair/bsc with mod a MET and exceeded  Pt will perform standing task x 3 min with cga/sba assistance MET  Pt will tolerate 45 minutes of tx without fatigue ONGOING/PROGRESSING      LT.Restore to max I with self care and mobility.    Patient is adamantly stating she is ready to go home with home health and her daughter's svn.                          Plan:  Patient to be seen 5 x/week to address the above listed problems via self-care/home management, therapeutic activities, therapeutic exercises, wheelchair management/training  Plan of Care expires: 02/10/23  Plan of Care reviewed with: patient, caregiver, daughter         2023

## 2023-01-17 NOTE — PLAN OF CARE
Problem: Adult Inpatient Plan of Care  Goal: Plan of Care Review  1/17/2023 1256 by Rosangela Carter RN  Outcome: Adequate for Care Transition  1/17/2023 0720 by Rosangela Carter RN  Outcome: Ongoing, Progressing  Goal: Patient-Specific Goal (Individualized)  1/17/2023 1256 by Rosangela Carter RN  Outcome: Adequate for Care Transition  1/17/2023 0720 by Rosangela Carter RN  Outcome: Ongoing, Progressing  Goal: Absence of Hospital-Acquired Illness or Injury  1/17/2023 1256 by Rosangela Carter RN  Outcome: Adequate for Care Transition  1/17/2023 0720 by Rosangela Carter RN  Outcome: Ongoing, Progressing  Goal: Optimal Comfort and Wellbeing  1/17/2023 1256 by Rosangela Carter RN  Outcome: Adequate for Care Transition  1/17/2023 0720 by Rosangela Carter RN  Outcome: Ongoing, Progressing  Goal: Readiness for Transition of Care  1/17/2023 1256 by Rosangela Carter RN  Outcome: Adequate for Care Transition  1/17/2023 0720 by Rosangela Carter RN  Outcome: Ongoing, Progressing     Problem: Gas Exchange Impaired  Goal: Optimal Gas Exchange  Outcome: Adequate for Care Transition     Problem: Fall Injury Risk  Goal: Absence of Fall and Fall-Related Injury  Outcome: Adequate for Care Transition     Problem: Skin Injury Risk Increased  Goal: Skin Health and Integrity  Outcome: Adequate for Care Transition     Problem: Impaired Wound Healing  Goal: Optimal Wound Healing  Outcome: Adequate for Care Transition     Problem: Diabetes Comorbidity  Goal: Blood Glucose Level Within Targeted Range  Outcome: Adequate for Care Transition     Problem: Adjustment to Illness COPD (Chronic Obstructive Pulmonary Disease)  Goal: Optimal Chronic Illness Coping  Outcome: Adequate for Care Transition     Problem: Functional Ability Impaired COPD (Chronic Obstructive Pulmonary Disease)  Goal: Optimal Level of Functional [place]  Outcome: Adequate for Care Transition     Problem: Infection COPD (Chronic Obstructive Pulmonary Disease)  Goal: Absence of Infection Signs and  Symptoms  Outcome: Adequate for Care Transition     Problem: Oral Intake Inadequate COPD (Chronic Obstructive Pulmonary Disease)  Goal: Improved Nutrition Intake  Outcome: Adequate for Care Transition     Problem: Respiratory Compromise COPD (Chronic Obstructive Pulmonary Disease)  Goal: Effective Oxygenation and Ventilation  Outcome: Adequate for Care Transition

## 2023-01-17 NOTE — NURSING
"Pt requesting to go home, states "Therapy told me that I had benefited and done everything I can do here. I'm ready to go home."  "

## 2023-01-17 NOTE — DISCHARGE SUMMARY
[facility] Medicine  Discharge Summary      Patient Name: Karina Rojas  MRN: [id number]  N[place]: [id number]  Patient Class: IP- Swing  Admission Date: 1/4/2023  Hospital Length of Stay: 13 days  Discharge Date and Time:  01/17/2023 12:59 PM  Attending Physician: Jorge Buckley DO   Discharging Provider: Jorge Buckley DO  Primary Care Provider: Thalia Bowie NP    Primary Care Team: Networked reference to record PCT     HPI:   Ms Rojas is an 81 y/o female admitted to [facility] for rehabilitation services following right hip fracture secondary to fall requiring Right hip arthroplasty. She initially presented to [facility] ED on 12/28 after trip and fall in home where she was found to have an acute comminuted and minimally displaced right intertrochanteric fracture. She was transferred to [facility] for ortho intervention. She underwent surgical repair of right hip on 12/30. Non-removable surgical dressing to be removed 10 days post op (1/9). Staples to be removed 14 days post op (1/13) and steri strips applied. Ortho follow up in 3 weeks. Prior to fall, she was ambulatory with walker dependence. She is on chronic O2 @ 2L NC with hx of COPD. She will be admitted for PT/OT rehabilitation with hopes to return home when goals are met.     Code Status: FULL CODE    PMH: HTN, Atrial Fibrillation on coumadin, DM, HF, COPD oxygen dependant.  On 2 liters at home      * No surgery found *      Hospital Course:   1/9 Follow up today, in bed, c/o back pain which is chronic.  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  Currently on Lovenox/Coumadin bridge.  Had some leg warmth.     1/13 US neg for DVT.  INR still elevated but pharmacy following.  She is doing well and motivated to improve.     1/17 Doing well.  Ready for DC.  Did well with therapy.  Had some time adjusting Coumadin but now within range.  Will DC home and follow up with PCP.         Goals of Care Treatment  Preferences:  Code Status: Full Code      Consults:   Consults (From admission, onward)        Status Ordering Provider     Inpatient consult to Respiratory Care  Once        Provider:  (Not yet assigned)    Completed JASPREET PEARSON     Inpatient consult to Registered Dietitian/Nutritionist  Once        Provider:  (Not yet assigned)    Completed JASPREET PEARSON          No new Assessment & Plan notes have been filed under this hospital service since the last note was generated.  Service: Hospital Medicine    Final Active Diagnoses:    Diagnosis Date Noted POA    HTN (hypertension) [I10] 01/04/2023 Yes    Atrial fibrillation [I48.91] 01/04/2023 Yes    Diabetes [E11.9] 01/04/2023 Yes      Problems Resolved During this Admission:    Diagnosis Date Noted Date Resolved POA    PRINCIPAL PROBLEM:  S/P total right hip arthroplasty [Z96.641] 01/04/2023 01/17/2023 Not Applicable    Acute cystitis without hematuria [N30.00] 01/06/2023 01/17/2023 Yes    COPD exacerbation [J44.1] 01/04/2023 01/17/2023 Yes    Presence of surgical incision [Z78.9] 01/04/2023 01/17/2023 Yes       Discharged Condition: good    Disposition: Home or Self Care    Follow Up:   Follow-up Information     Teofilo Lewis MD Follow up.    Specialty: Orthopedic Surgery  Why: KEEP RESCHEDULE APPOINTMENT.  Contact information:  [address] [facility]  [place] [address] [phone]             Thalia Bowie NP Follow up in 1 week(s).    Specialty: Family Medicine  Contact information:  [address]  [facility] [place] [address] [phone]                       Patient Instructions:   No discharge procedures on file.    Significant Diagnostic Studies: Labs:   INR   Lab Results   Component Value Date    INR 2.68 01/17/2023    INR 2.86 01/16/2023    INR 2.68 01/15/2023    and All labs within the past 24 hours have been reviewed    Pending Diagnostic Studies:     None         Medications:  Reconciled Home  Medications:      Medication List      CHANGE how you take these medications    levothyroxine 50 MCG tablet  Commonly known as: SYNTHROID  Take 1 tablet (50 mcg total) by mouth before breakfast.  Start taking on: January 18, 2023  What changed:   · medication strength  · how much to take  · when to take this        CONTINUE taking these medications    amiodarone 200 MG Tab  Commonly known as: PACERONE  Take 200 mg by mouth 2 (two) times daily. Take 1 tablet by mouth in the morning and 1 tablet before bedtime.     atorvastatin 40 MG tablet  Commonly known as: LIPITOR  Take 40 mg by mouth.     digoxin 125 mcg tablet  Commonly known as: LANOXIN  Take 125 mcg by mouth.     furosemide 40 MG tablet  Commonly known as: LASIX  Take 40 mg by mouth once daily.     metFORMIN 1000 MG tablet  Commonly known as: GLUCOPHAGE  Take 1,000 mg by mouth 2 (two) times daily.     predniSONE 5 MG tablet  Commonly known as: DELTASONE  Take 5 mg by mouth.     TRELEGY ELLIPTA INHL  Inhale into the lungs daily as needed.     * warfarin 5 MG tablet  Commonly known as: COUMADIN  Take by mouth.     * warfarin 4 MG tablet  Commonly known as: COUMADIN  Take 4 mg by mouth. Take 1 tablet by mouth 4 times a week.         * This list has 2 medication(s) that are the same as other medications prescribed for you. Read the directions carefully, and ask your doctor or other care provider to review them with you.            STOP taking these medications    oxyCODONE 10 mg Tab immediate release tablet  Commonly known as: ROXICODONE     polyethylene glycol 17 gram Pwpk  Commonly known as: GLYCOLAX            Indwelling Lines/Drains at time of discharge:   Lines/Drains/Airways     None                 Time spent on the discharge of patient: 35 minutes         Jorge Buckley DO  Department of Hospital Medicine  [facility]

## 2023-01-17 NOTE — NURSING
Nurses Note -- 4 Eyes      1/16/2023   7:19 PM      Skin assessed during: Q Shift Change      [] No Pressure Injuries Present    []Prevention Measures Documented      [] Yes- Altered Skin Integrity Present or Discovered   [] LDA Added if Not in Epic (Describe Wound)   [x] New Altered Skin Integrity was Present on Admit and Documented in LDA   [] Wound Image Taken    Wound Care Consulted? No    Attending Nurse:  Teofilo Lizarraga LPN     Second RN/Staff Member:  Rosangela Carter RN

## 2023-02-10 ENCOUNTER — HOSPITAL ENCOUNTER (INPATIENT)
Facility: HOSPITAL | Age: 83
LOS: 28 days | Discharge: HOME HEALTH | DRG: 561 | End: 2023-03-10
Attending: HOSPITALIST | Admitting: HOSPITALIST
Payer: MEDICARE

## 2023-02-10 DIAGNOSIS — Z96.649 S/P TOTAL HIP ARTHROPLASTY: ICD-10-CM

## 2023-02-10 DIAGNOSIS — Z98.890 STATUS POST HIP SURGERY: Primary | ICD-10-CM

## 2023-02-10 LAB
GLUCOSE SERPL-MCNC: 183 MG/DL (ref 70–105)
GLUCOSE SERPL-MCNC: 210 MG/DL (ref 70–105)
INR BLD: 1.83
PROTHROMBIN TIME: 21.1 SECONDS (ref 11.7–14.7)

## 2023-02-10 PROCEDURE — 99900035 HC TECH TIME PER 15 MIN (STAT)

## 2023-02-10 PROCEDURE — 82962 GLUCOSE BLOOD TEST: CPT

## 2023-02-10 PROCEDURE — 63600175 PHARM REV CODE 636 W HCPCS

## 2023-02-10 PROCEDURE — 97163 PT EVAL HIGH COMPLEX 45 MIN: CPT

## 2023-02-10 PROCEDURE — 27000221 HC OXYGEN, UP TO 24 HOURS

## 2023-02-10 PROCEDURE — 11000004 HC SNF PRIVATE

## 2023-02-10 PROCEDURE — 97167 OT EVAL HIGH COMPLEX 60 MIN: CPT

## 2023-02-10 PROCEDURE — 25000003 PHARM REV CODE 250

## 2023-02-10 PROCEDURE — 85610 PROTHROMBIN TIME: CPT

## 2023-02-10 RX ORDER — AMOXICILLIN 250 MG
1 CAPSULE ORAL 2 TIMES DAILY PRN
Status: DISCONTINUED | OUTPATIENT
Start: 2023-02-10 | End: 2023-03-10 | Stop reason: HOSPADM

## 2023-02-10 RX ORDER — IBUPROFEN 200 MG
24 TABLET ORAL
Status: DISCONTINUED | OUTPATIENT
Start: 2023-02-10 | End: 2023-03-10 | Stop reason: HOSPADM

## 2023-02-10 RX ORDER — WARFARIN 1 MG/1
4 TABLET ORAL
Status: DISCONTINUED | OUTPATIENT
Start: 2023-02-11 | End: 2023-03-10 | Stop reason: HOSPADM

## 2023-02-10 RX ORDER — METFORMIN HYDROCHLORIDE 500 MG/1
1000 TABLET ORAL
Status: DISCONTINUED | OUTPATIENT
Start: 2023-02-11 | End: 2023-02-14

## 2023-02-10 RX ORDER — OXYCODONE HYDROCHLORIDE 5 MG/1
10 TABLET ORAL EVERY 6 HOURS PRN
Status: DISCONTINUED | OUTPATIENT
Start: 2023-02-10 | End: 2023-03-10 | Stop reason: HOSPADM

## 2023-02-10 RX ORDER — ATORVASTATIN CALCIUM 40 MG/1
40 TABLET, FILM COATED ORAL NIGHTLY
Status: DISCONTINUED | OUTPATIENT
Start: 2023-02-10 | End: 2023-03-10 | Stop reason: HOSPADM

## 2023-02-10 RX ORDER — AMIODARONE HYDROCHLORIDE 200 MG/1
200 TABLET ORAL 2 TIMES DAILY
Status: DISCONTINUED | OUTPATIENT
Start: 2023-02-10 | End: 2023-03-10 | Stop reason: HOSPADM

## 2023-02-10 RX ORDER — FAMOTIDINE 20 MG/1
20 TABLET, FILM COATED ORAL 2 TIMES DAILY
Status: DISCONTINUED | OUTPATIENT
Start: 2023-02-10 | End: 2023-02-20 | Stop reason: DRUGHIGH

## 2023-02-10 RX ORDER — PROCHLORPERAZINE EDISYLATE 5 MG/ML
2.5 INJECTION INTRAMUSCULAR; INTRAVENOUS EVERY 6 HOURS PRN
Status: DISCONTINUED | OUTPATIENT
Start: 2023-02-10 | End: 2023-02-12

## 2023-02-10 RX ORDER — PREDNISONE 5 MG/1
5 TABLET ORAL DAILY
Status: DISCONTINUED | OUTPATIENT
Start: 2023-02-11 | End: 2023-03-10 | Stop reason: HOSPADM

## 2023-02-10 RX ORDER — IBUPROFEN 200 MG
16 TABLET ORAL
Status: DISCONTINUED | OUTPATIENT
Start: 2023-02-10 | End: 2023-03-10 | Stop reason: HOSPADM

## 2023-02-10 RX ORDER — GLUCAGON 1 MG
1 KIT INJECTION
Status: DISCONTINUED | OUTPATIENT
Start: 2023-02-10 | End: 2023-03-10 | Stop reason: HOSPADM

## 2023-02-10 RX ORDER — INSULIN ASPART 100 [IU]/ML
0-5 INJECTION, SOLUTION INTRAVENOUS; SUBCUTANEOUS
Status: DISCONTINUED | OUTPATIENT
Start: 2023-02-10 | End: 2023-03-10 | Stop reason: HOSPADM

## 2023-02-10 RX ORDER — FUROSEMIDE 40 MG/1
40 TABLET ORAL DAILY
Status: DISCONTINUED | OUTPATIENT
Start: 2023-02-11 | End: 2023-03-10 | Stop reason: HOSPADM

## 2023-02-10 RX ORDER — OXYCODONE HCL 10 MG/1
10 TABLET, FILM COATED, EXTENDED RELEASE ORAL EVERY 6 HOURS PRN
Status: ON HOLD | COMMUNITY
Start: 2023-02-10 | End: 2023-03-10 | Stop reason: HOSPADM

## 2023-02-10 RX ORDER — CALCIUM CARBONATE 200(500)MG
500 TABLET,CHEWABLE ORAL 2 TIMES DAILY PRN
Status: DISCONTINUED | OUTPATIENT
Start: 2023-02-10 | End: 2023-03-10 | Stop reason: HOSPADM

## 2023-02-10 RX ORDER — DIGOXIN 125 MCG
0.12 TABLET ORAL DAILY
Status: DISCONTINUED | OUTPATIENT
Start: 2023-02-11 | End: 2023-03-10 | Stop reason: HOSPADM

## 2023-02-10 RX ORDER — LEVOTHYROXINE SODIUM 25 UG/1
25 TABLET ORAL
Status: DISCONTINUED | OUTPATIENT
Start: 2023-02-11 | End: 2023-03-10 | Stop reason: HOSPADM

## 2023-02-10 RX ORDER — TALC
6 POWDER (GRAM) TOPICAL NIGHTLY PRN
Status: DISCONTINUED | OUTPATIENT
Start: 2023-02-10 | End: 2023-03-10 | Stop reason: HOSPADM

## 2023-02-10 RX ADMIN — FAMOTIDINE 20 MG: 20 TABLET, FILM COATED ORAL at 08:02

## 2023-02-10 RX ADMIN — INSULIN ASPART 1 UNITS: 100 INJECTION, SOLUTION INTRAVENOUS; SUBCUTANEOUS at 08:02

## 2023-02-10 RX ADMIN — OXYCODONE HYDROCHLORIDE 10 MG: 10 TABLET ORAL at 03:02

## 2023-02-10 RX ADMIN — AMIODARONE HYDROCHLORIDE 200 MG: 200 TABLET ORAL at 08:02

## 2023-02-10 RX ADMIN — OXYCODONE HYDROCHLORIDE 10 MG: 10 TABLET ORAL at 10:02

## 2023-02-10 RX ADMIN — ATORVASTATIN CALCIUM 40 MG: 40 TABLET, FILM COATED ORAL at 08:02

## 2023-02-10 NOTE — PT/OT/SLP EVAL
Physical Therapy Evaluation    Patient Name:  Karina Rojas   MRN:  [id number]    Recommendations:     Discharge Recommendations: home with home health   Discharge Equipment Recommendations: wheelchair, grab bar   Barriers to discharge: Decreased caregiver support    Assessment:     Karina Rojas is a 82 y.o. female admitted with a medical diagnosis of s/p right SHYANNE following failure of TFN (screw backed out).  She presents with the following impairments/functional limitations: weakness, impaired functional mobility, gait instability, impaired balance, decreased lower extremity function, pain, edema, orthopedic precautions . Pt recalled 2/3 posterior SHYANNE precautions (forgot hip IR). Pt educated in not letting right hip roll internally. Pt educated in no active right hip abd, but staff can passively abduct it. PT educated pt's CNALuiz and nurse, Jeny, in hip px's and no active abd with understanding expressed. PT was unable to assess bed mob, transfers or ambulation because she was very nauseated and unable to fully participate. PT assess MMT and ROM only.    Rehab Prognosis: Fair; patient would benefit from acute skilled PT services to address these deficits and reach maximum level of function.    Recent Surgery: * No surgery found *      Plan:     During this hospitalization, patient to be seen BID (posterior right SHYANNE px's. No active abduction.) to address the identified rehab impairments via gait training, wheelchair management/training, therapeutic activities, therapeutic exercises, neuromuscular re-education and progress toward the following goals:  PMHX: DM, HTN, AF, COPD, chronic back pain.  Plan of Care Expires:  03/24/23    Subjective     Chief Complaint: Pt states that when she was in previous hosp, she was able to walk a few feet, but has been severely nauseated past few days. States that when she stands, it feels like her right knee will buckle. States that she was originally supposed to have a  "bipolar hemiarthroplasty, but ended having to have a SHYANNE due to the loose screw eroded the acetabulum.  Patient/Family Comments/goals: Pt plans to dc home with dtr and home  health to assist when able.  Pain/Comfort:  Pain Rating 1: 5/10  Location - Side 1: Right  Location - Orientation 1: lower  Location 1: hip  Pain Addressed 1: Pre-medicate for activity  Pain Rating Post-Intervention 1: 5/10  Pain Rating 2: 8/10  Location - Side 2: Bilateral  Location - Orientation 2: midline  Location 2: back  Pain Addressed 2: Pre-medicate for activity  Pain Rating Post-Intervention 2: 8/10    Patients cultural, spiritual, Rastafarian conflicts given the current situation: no    Living Environment:  Pt lives with her dtr. She has ramped threshold.  Prior to admission, patients level of function: Pt stated that after she was dc'd home after TFN rehab, the screw began to work its way out due to bone "crumbling". This caused her to require assistance with transfers and amb'n. A week prior to SHYANNE, pt was told to stop amb'g.  Equipment used at home: walker, rolling, bedside commode, shower chair, wheelchair (MWC is borrowed).  DME owned (not currently used):  Pt has scooter on order, but not yet received. States it was ordered 3 weeks ago .  Upon discharge, patient will have assistance from dtr and home health.    Objective:     Communicated with pt prior to session.  Patient found HOB elevated with SCD  upon PT entry to room.    General Precautions: Standard, diabetic, fall  Orthopedic Precautions:RLE weight bearing as tolerated (No active right hip abduction.)   Braces:    Respiratory Status: Nasal cannula, flow 2 L/min    Exams:  Cognitive Exam:  Patient is oriented to Person, Place, Time, and Situation  RLE ROM: Pt limited with passive right hip abd to 20 degrees by visual inspection. PT unable to assess hip flex/ext due to nausea and spitting up. Pt has full hip ER. Pt tight in calf.  RLE Strength: Deficits: hip flex=2-/5, " abd=NT, add=3-/5, IR=3-/5, PF=3+/5.  LLE ROM: WFL  LLE Strength: Deficits: grossly 4-/5    Functional Mobility:  PT unable to assess function during partial eval due to pt severely nauseated and unable to move.     AM-PAC 6 CLICK MOBILITY  Total Score:7       Treatment & Education:  Partial eval performed (MMT, ROM). Pt recalled 2/3 posterior right hip SHYANNE (forgot IR). Pt educated in not letting right hip roll internally. Pt educated in no active right hip abd, but staff can passively abduct it. PT educated pt's CNALuiz and nurse, Jeny, in hip px's and no active abd with understanding expressed. They are both scheduled to work Saturday. Pt instructed to perform over the weekend: AP, GS, QS which she was taught in the hosp. When PT attempted to have pt perform AP, her nausea worsened so exercise stopped.    Patient left HOB elevated with call button in reach and CNA AND RN present.    GOALS:   Multidisciplinary Problems       Physical Therapy Goals          Problem: Physical Therapy    Goal Priority Disciplines Outcome Goal Variances Interventions   Physical Therapy Goal     PT, PT/OT Ongoing, Progressing     Description: STG - 3 weeks  1. Pt min A x 1 with bed mob.  2. Pt min A x 1  with transfers using RW.  3. Pt will amb 25 ft with min A x 1 using RW.  4. Pt will have 3/5 RLE strength.  5. Pt will recall 3/3 SHYANNE precautions.    LTG - 6 weeks  1. Pt SBA with bed mob.  2. Pt SBA with transfers using RW.  3. Pt will amb 50 ft with SBA using RW.  4. Pt will have 4-/5 RLE strength.                       History:     Past Medical History:   Diagnosis Date    Carotid artery stenosis     Chronic respiratory failure with hypoxia     COPD (chronic obstructive pulmonary disease)     Diabetes mellitus     DVT (deep venous thrombosis)     Heart failure     High cholesterol     Hypertension     Hypothyroidism     Paroxysmal atrial fibrillation        Past Surgical History:   Procedure Laterality Date    APPENDECTOMY       BACK SURGERY  1980 1984    BILATERAL TUBAL LIGATION  1994    CARDIAC SURGERY  1990    aorta bypass    caritod Left     HERNIA REPAIR      intestine repair         Time Tracking:     PT Received On: 02/10/23  PT Start Time: 1342     PT Stop Time: 1402  PT Total Time (min): 20 min     Billable Minutes: Evaluation 20 min      02/10/2023

## 2023-02-10 NOTE — PLAN OF CARE
Problem: Physical Therapy  Goal: Physical Therapy Goal  Description: STG - 3 weeks  1. Pt min A x 1 with bed mob.  2. Pt min A x 1  with transfers using RW.  3. Pt will amb 25 ft with min A x 1 using RW.  4. Pt will have 3/5 RLE strength.  5. Pt will recall 3/3 SHYANNE precautions.    LTG - 6 weeks  1. Pt SBA with bed mob.  2. Pt SBA with transfers using RW.  3. Pt will amb 50 ft with SBA using RW.  4. Pt will have 4-/5 RLE strength.  Outcome: Ongoing, Progressing

## 2023-02-10 NOTE — PT/OT/SLP EVAL
Occupational Therapy   Evaluation    Name: Karina Rojas  MRN: [id number]  Admitting Diagnosis: <principal problem not specified>  Recent Surgery: * No surgery found *      Recommendations:     Discharge Recommendations: home with home health, home health PT, home health OT  Discharge Equipment Recommendations:  hip kit  Barriers to discharge:  None    Assessment:     Karina Rojas is a 82 y.o. female with a medical diagnosis of s/p SHYANNE.  She presents with vomiting and nausea (like phlegm). Performance deficits affecting function: weakness, impaired endurance, impaired self care skills, impaired functional mobility, gait instability, impaired balance, decreased lower extremity function, pain, impaired skin, impaired cardiopulmonary response to activity, orthopedic precautions.      Rehab Prognosis: Good; patient would benefit from acute skilled OT services to address these deficits and reach maximum level of function.       Plan:     Patient to be seen 5 x/week to address the above listed problems via self-care/home management, therapeutic activities, therapeutic exercises, wheelchair management/training  Plan of Care Expires: 03/10/23  Plan of Care Reviewed with: patient, daughter    Subjective     Chief Complaint: weakness and nausea today  Patient/Family Comments/goals: get back home soon, but I probably wont try to leave so quick this time    Occupational Profile:  Living Environment: home with her daughter  Previous level of function: SBA overall with occasional assist for LB dressing  Roles and Routines: retired, home with daughter, active with family   Equipment Used at Home: walker, rolling, 3-in-1 commode, shower chair  Assistance upon Discharge: SBA and AE as needed    Pain/Comfort:  Pain Rating 1: 8/10 (back pain)  Location - Side 1: Bilateral  Location - Orientation 1: lower  Location 1: back  Pain Addressed 1: Reposition, Distraction  Pain Rating Post-Intervention 1: 8/10    Patients cultural,  spiritual, Advent conflicts given the current situation: no    Objective:     Communicated with: pt, PT, nursing, daughter prior to session.  Patient found HOB elevated with SCD upon OT entry to room.    General Precautions: Standard, diabetic, fall  Orthopedic Precautions: RLE weight bearing as tolerated  Braces: N/A  Respiratory Status: Nasal cannula, flow 3 L/min    Occupational Performance:    Bed Mobility:    Not attempted due to pt nausea    Functional Mobility/Transfers:  Not attempted due to nausea; pt states she stood and took about 3 steps with therapy at the other hospital before getting nauseated and needing to sit.    Activities of Daily Living:  Feeding:  supervision setup  Grooming: minimum assistance overall at bedside  Bathing: moderate assistance bedside in supine at this time  Upper Body Dressing: minimum assistance bedside  Lower Body Dressing: maximal assistance    Toileting: moderate assistance overall, needs to use BSC, discussed this with CNA and pt, put instructions for transfer on white board    Cognitive/Visual Perceptual:  Cognitive/Psychosocial Skills:     -       Oriented to: Person, Place, Time, and Situation   -       Follows Commands/attention:Follows multistep  commands  -       Communication: clear/fluent  -       Memory: No Deficits noted  -       Safety awareness/insight to disability: intact   -       Mood/Affect/Coping skills/emotional control: Appropriate to situation, Cooperative, Pleasant, and nauseated    Physical Exam:  Dominant hand:    -       right  Upper Extremity Range of Motion:     -       Right Upper Extremity: WFL  -       Left Upper Extremity: WFL  Upper Extremity Strength:    -       Right Upper Extremity: -4/5  -       Left Upper Extremity: -4/5   Strength:    -       Right Upper Extremity: -4/5  -       Left Upper Extremity: -4/5  Fine Motor Coordination:    -       Intact  Gross motor coordination:   WFL    AMPAC 6 Click ADL:  AMPAC Total Score:  17    Treatment & Education:  Evaluation only today; will begin tx on Monday.  HOME EXERCISE PROGRAM instructions given for weekend to family and pt    Patient left HOB elevated with call button in reach and nursing and family present    GOALS:   Multidisciplinary Problems       Occupational Therapy Goals          Problem: Occupational Therapy    Goal Priority Disciplines Outcome Interventions   Occupational Therapy Goal     OT, PT/OT Ongoing, Progressing    Description: STG: within 2 weeks  Pt will perform grooming with setup at sinkside  Pt will bathe with min a at sinkside  Pt will perform UE dressing with svn at sinkside  Pt will perform LE dressing with min a sinkside  Pt will sit EOB x 30 min with min assistance  Pt will transfer bed/chair/bsc with SBA and RW  Pt will perform standing task x 3 min with CGA/SBA assistance  Pt will tolerate 35 minutes of tx without fatigue      LT.Restore to max I with self care and mobility.                          History:     Past Medical History:   Diagnosis Date    Carotid artery stenosis     Chronic respiratory failure with hypoxia     COPD (chronic obstructive pulmonary disease)     Diabetes mellitus     DVT (deep venous thrombosis)     Heart failure     High cholesterol     Hypertension     Hypothyroidism     Paroxysmal atrial fibrillation          Past Surgical History:   Procedure Laterality Date    APPENDECTOMY      BACK SURGERY  1980    BILATERAL TUBAL LIGATION      CARDIAC SURGERY      aorta bypass    caritod Left     HERNIA REPAIR      intestine repair         Time Tracking:     OT Date of Treatment: 02/10/23  OT Start Time: 1345  OT Stop Time: 1400  OT Total Time (min): 15 min    Billable Minutes:Evaluation 15    2/10/2023

## 2023-02-10 NOTE — H&P
[facility] - Medical Surgical Unit    History & Physical      Patient Name: Karina Rojas  MRN: [id number]  Admission Date: 2/10/2023  Attending Physician: Jorge Buckley DO   Primary Care Provider: Thalia Bowie NP         Patient information was obtained from patient and ER records.     Subjective:     Principal Problem:Right Total Hip Replacement    Chief Complaint: Right Hip Replacment     HPI: Patient admitted for [facility] S/P right hip replacment    Past Medical History:   Diagnosis Date    Carotid artery stenosis     Chronic respiratory failure with hypoxia     COPD (chronic obstructive pulmonary disease)     Diabetes mellitus     DVT (deep venous thrombosis)     Heart failure     High cholesterol     Hypertension     Hypothyroidism     Paroxysmal atrial fibrillation        Past Surgical History:   Procedure Laterality Date    APPENDECTOMY      BACK SURGERY  1980 1984    BILATERAL TUBAL LIGATION  1994    CARDIAC SURGERY  1990    aorta bypass    caritod Left     HERNIA REPAIR      intestine repair         Review of patient's allergies indicates:   Allergen Reactions    Tylenol [acetaminophen] Anaphylaxis and Shortness Of Breath       Current Facility-Administered Medications on File Prior to Encounter   Medication    [DISCONTINUED] GENERIC EXTERNAL MEDICATION     Current Outpatient Medications on File Prior to Encounter   Medication Sig    amiodarone (PACERONE) 200 MG Tab Take 200 mg by mouth 2 (two) times daily. Take 1 tablet by mouth in the morning and 1 tablet before bedtime.    atorvastatin (LIPITOR) 40 MG tablet Take 40 mg by mouth.    digoxin (LANOXIN) 125 mcg tablet Take 125 mcg by mouth.    furosemide (LASIX) 40 MG tablet Take 40 mg by mouth once daily.    levothyroxine (SYNTHROID) 50 MCG tablet Take 1 tablet (50 mcg total) by mouth before breakfast. (Patient taking differently: Take 25 mcg by mouth before breakfast.)    metFORMIN (GLUCOPHAGE) 1000 MG tablet Take 1,000  mg by mouth daily with breakfast.    oxyCODONE (OXYCONTIN) 10 mg 12 hr tablet Take 10 mg by mouth every 6 (six) hours as needed for Pain.    predniSONE (DELTASONE) 5 MG tablet Take 5 mg by mouth.    warfarin (COUMADIN) 4 MG tablet Take 4 mg by mouth. Take 1 tablet by mouth 4 times a week.    fluticasone/umeclidin/vilanter (TRELEGY ELLIPTA INHL) Inhale into the lungs daily as needed.    warfarin (COUMADIN) 5 MG tablet Take by mouth.     Family History    None       Tobacco Use    Smoking status: Never    Smokeless tobacco: Never   Substance and Sexual Activity    Alcohol use: Not on file    Drug use: Not on file    Sexual activity: Not on file     Review of Systems   Constitutional: Negative.    HENT: Negative.     Eyes: Negative.    Respiratory:  Positive for wheezing.    Cardiovascular: Negative.    Gastrointestinal: Negative.    Endocrine: Negative.    Genitourinary: Negative.    Musculoskeletal:  Positive for gait problem.   Skin:  Positive for wound.   Allergic/Immunologic: Negative.    Hematological: Negative.    Psychiatric/Behavioral: Negative.     Objective:     Vital Signs (Most Recent):  Temp: 97.7 °F (36.5 °C) (02/10/23 1249)  Pulse: 81 (02/10/23 1249)  Resp: (!) 25 (02/10/23 1249)  BP: (!) 100/50 (02/10/23 1249)  SpO2: (!) 93 % (02/10/23 1249)   Vital Signs (24h Range):  Temp:  [97.7 °F (36.5 °C)] 97.7 °F (36.5 °C)  Pulse:  [81] 81  Resp:  [25] 25  SpO2:  [93 %] 93 %  BP: (100)/(50) 100/50        There is no height or weight on file to calculate BMI.    Physical Exam  Vitals and nursing note reviewed. Exam conducted with a chaperone present.   Constitutional:       General: She is not in acute distress.     Appearance: Normal appearance. She is normal weight. She is not ill-appearing, toxic-appearing or diaphoretic.   HENT:      Head: Atraumatic.      Right Ear: Tympanic membrane, ear canal and external ear normal. There is no impacted cerumen.      Left Ear: Tympanic membrane, ear canal and external ear  normal. There is no impacted cerumen.      Nose: Nose normal. No congestion or rhinorrhea.      Mouth/Throat:      Mouth: Mucous membranes are dry.      Pharynx: Oropharynx is clear. No oropharyngeal exudate or posterior oropharyngeal erythema.   Eyes:      General: No scleral icterus.        Right eye: No discharge.         Left eye: No discharge.      Extraocular Movements: Extraocular movements intact and EOM normal.      Conjunctiva/sclera: Conjunctivae normal.      Pupils: Pupils are equal, round, and reactive to light.   Neck:      Vascular: No carotid bruit.   Cardiovascular:      Rate and Rhythm: Normal rate and regular rhythm.      Pulses: Normal pulses.      Heart sounds: Normal heart sounds. No murmur heard.    No friction rub. No gallop.   Pulmonary:      Effort: No respiratory distress.      Breath sounds: No stridor. Wheezing present. No rhonchi or rales.   Chest:      Chest wall: No tenderness.   Abdominal:      General: Abdomen is flat. Bowel sounds are normal.      Palpations: Abdomen is soft.   Musculoskeletal:         General: Normal range of motion.      Cervical back: Normal range of motion and neck supple. No rigidity or tenderness.      Right lower leg: Edema present.   Lymphadenopathy:      Cervical: No cervical adenopathy.   Skin:     General: Skin is warm and dry.      Comments: Incision to right hip noted   Neurological:      General: No focal deficit present.      Mental Status: She is alert and oriented to person, place, and time. Mental status is at baseline.      Cranial Nerves: No cranial nerve deficit.      Sensory: No sensory deficit.      Motor: No weakness.      Coordination: Coordination normal.      Gait: Gait normal.      Deep Tendon Reflexes: Reflexes normal.   Psychiatric:         Mood and Affect: Mood normal.         Behavior: Behavior normal.         Thought Content: Thought content normal.         Judgment: Judgment normal.         CRANIAL NERVES     CN I  normal  olfaction    CN II   Visual acuity: normal  Right visual field deficit: none  Left visual field deficit: none     CN III, IV, VI   Pupils are equal, round, and reactive to light.  Extraocular motions are normal.   Right pupil: Size: 2 mm. Shape: regular. Reactivity: brisk. Consensual response: intact. Accommodation: intact.   Left pupil: Size: 2 mm. Shape: regular. Reactivity: brisk. Consensual response: intact. Accommodation: intact.   CN III: no CN III palsy  Nystagmus: none   Diplopia: none  Ophthalmoparesis: none  Upgaze: normal  Downgaze: normal  Conjugate gaze: present  Vestibulo-ocular reflex: present    CN V   Facial sensation intact.   Right facial sensation deficit: none  Left facial sensation deficit: none  Right corneal reflex: normal  Left corneal reflex: normal  Jaw jerk: normal    CN VII   Facial expression full, symmetric.   Right facial weakness: none  Left facial weakness: none  Right taste: normal  Left taste: normal    CN VIII   CN VIII normal.   Hearing: intact    CN IX, X   CN IX normal.   CN X normal.   Palate: symmetric  Right gag reflex: normal  Left gag reflex: normal    CN XI   CN XI normal.   Right sternocleidomastoid strength: normal  Left sternocleidomastoid strength: normal  Right trapezius strength: normal  Left trapezius strength: normal    CN XII   CN XII normal.   Tongue: not atrophic  Fasciculations: absent  Tongue deviation: none    Significant Labs: All pertinent labs within the past 24 hours have been reviewed.    Significant Imaging: I have reviewed all pertinent imaging results/findings within the past 24 hours.    Assessment/Plan:     There are no hospital problems to display for this patient.    VTE Risk Mitigation (From admission, onward)           Ordered     warfarin (COUMADIN) tablet 4 mg  Every Tues, Thurs, Sat, Sun         02/10/23 1317     IP VTE HIGH RISK PATIENT  Once         02/10/23 1311     Place sequential compression device  Until discontinued         02/10/23  1786                      PREETHI Henson  Department of Hospital Medicine   [facility] - [facility] Surgical Unit

## 2023-02-10 NOTE — PROGRESS NOTES
Warfarin Note    Indication:a fib  INR Goal:2-3    Initial INR: 2.1 on 02/09/2023  Home Dose: 4mg S,T,T,S   Initial dose at verification:same  Major drug interactions:amiodarone  Hgb, Hematocrit, Platelets:awaiting labs    Lab Results from transferring facility documentation.  Component Value Date   INR 2.1 02/09/2023  INR 2.4 02/08/2023   INR 1.5 02/07/2023   INR 1.2 02/06/2023   PROTIME 26.2 (H) 02/08/2023   PROTIME 18.2 (H) 02/07/2023   PROTIME 15.9 (H) 02/06/2023     Patient has 2 therapeutic INRs and therefore will order weekly beginning today unless today's result is not in therapeutic range.  Was unable to find INR value for today in the previous chart.  Mike Nunez, Pharm. D.  Director of Pharmacy  [facility]  [phone]  [email or web address]

## 2023-02-10 NOTE — SUBJECTIVE & OBJECTIVE
Past Medical History:   Diagnosis Date    Carotid artery stenosis     Chronic respiratory failure with hypoxia     COPD (chronic obstructive pulmonary disease)     Diabetes mellitus     DVT (deep venous thrombosis)     Heart failure     High cholesterol     Hypertension     Hypothyroidism     Paroxysmal atrial fibrillation        Past Surgical History:   Procedure Laterality Date    APPENDECTOMY      BACK SURGERY  1980 1984    BILATERAL TUBAL LIGATION  1994    CARDIAC SURGERY  1990    aorta bypass    caritod Left     HERNIA REPAIR      intestine repair         Review of patient's allergies indicates:   Allergen Reactions    Tylenol [acetaminophen] Anaphylaxis and Shortness Of Breath       Current Facility-Administered Medications on File Prior to Encounter   Medication    [DISCONTINUED] GENERIC EXTERNAL MEDICATION     Current Outpatient Medications on File Prior to Encounter   Medication Sig    amiodarone (PACERONE) 200 MG Tab Take 200 mg by mouth 2 (two) times daily. Take 1 tablet by mouth in the morning and 1 tablet before bedtime.    atorvastatin (LIPITOR) 40 MG tablet Take 40 mg by mouth.    digoxin (LANOXIN) 125 mcg tablet Take 125 mcg by mouth.    furosemide (LASIX) 40 MG tablet Take 40 mg by mouth once daily.    levothyroxine (SYNTHROID) 50 MCG tablet Take 1 tablet (50 mcg total) by mouth before breakfast. (Patient taking differently: Take 25 mcg by mouth before breakfast.)    metFORMIN (GLUCOPHAGE) 1000 MG tablet Take 1,000 mg by mouth daily with breakfast.    oxyCODONE (OXYCONTIN) 10 mg 12 hr tablet Take 10 mg by mouth every 6 (six) hours as needed for Pain.    predniSONE (DELTASONE) 5 MG tablet Take 5 mg by mouth.    warfarin (COUMADIN) 4 MG tablet Take 4 mg by mouth. Take 1 tablet by mouth 4 times a week.    fluticasone/umeclidin/vilanter (TRELEGY ELLIPTA INHL) Inhale into the lungs daily as needed.    warfarin (COUMADIN) 5 MG tablet Take by mouth.     Family History    None       Tobacco Use     Smoking status: Never    Smokeless tobacco: Never   Substance and Sexual Activity    Alcohol use: Not on file    Drug use: Not on file    Sexual activity: Not on file     Review of Systems   Constitutional:  Negative for appetite change, chills and fever.   Respiratory:  Negative for cough, shortness of breath and wheezing.    Cardiovascular:  Negative for chest pain, palpitations and leg swelling.   Gastrointestinal:  Negative for abdominal distention, diarrhea, nausea and vomiting.   Genitourinary:  Negative for dysuria.   Musculoskeletal:         Bilateral foot shooting pain    Skin:  Negative for rash.   Neurological:  Negative for dizziness, seizures and syncope.   Psychiatric/Behavioral:  Negative for agitation, behavioral problems and confusion.    All other systems reviewed and are negative.  Objective:     Vital Signs (Most Recent):  Temp: 97.7 °F (36.5 °C) (02/10/23 1249)  Pulse: 81 (02/10/23 1249)  Resp: (!) 25 (02/10/23 1249)  BP: (!) 100/50 (02/10/23 1249)  SpO2: (!) 93 % (02/10/23 1249)   Vital Signs (24h Range):  Temp:  [97.7 °F (36.5 °C)] 97.7 °F (36.5 °C)  Pulse:  [81] 81  Resp:  [25] 25  SpO2:  [93 %] 93 %  BP: (100)/(50) 100/50        There is no height or weight on file to calculate BMI.    Physical Exam  Vitals reviewed.   Constitutional:       General: She is not in acute distress.     Appearance: Normal appearance.   HENT:      Head: Normocephalic and atraumatic.   Eyes:      General: No scleral icterus.     Extraocular Movements: Extraocular movements intact.      Conjunctiva/sclera: Conjunctivae normal.      Pupils: Pupils are equal, round, and reactive to light.   Cardiovascular:      Rate and Rhythm: Normal rate and regular rhythm.      Heart sounds: No murmur heard.    No friction rub. No gallop.   Pulmonary:      Effort: Pulmonary effort is normal. No respiratory distress.      Breath sounds: Normal breath sounds. No wheezing or rales.   Abdominal:      General: Abdomen is flat. Bowel  sounds are normal. There is no distension.      Palpations: Abdomen is soft.      Tenderness: There is no abdominal tenderness. There is no guarding.   Musculoskeletal:         General: No swelling.      Comments: Hip pain ok    Skin:     General: Skin is warm and dry.      Coloration: Skin is not jaundiced.      Findings: No rash.   Neurological:      General: No focal deficit present.      Mental Status: She is alert and oriented to person, place, and time.      Sensory: No sensory deficit.      Motor: Weakness present.   Psychiatric:         Mood and Affect: Mood normal.         Behavior: Behavior normal.         Thought Content: Thought content normal.         CRANIAL NERVES     CN III, IV, VI   Pupils are equal, round, and reactive to light.     Significant Labs: All pertinent labs within the past 24 hours have been reviewed.  Recent Lab Results         02/13/23  1042   02/13/23  0515   02/12/23  2039   02/12/23  1634        POC Glucose 182   149   148   184               Significant Imaging: I have reviewed all pertinent imaging results/findings within the past 24 hours.

## 2023-02-10 NOTE — HPI
Patient is a 82-year-old ill-appearing  female who is admitted to [facility] for skilled nursing services status post right intratrochanteric for mole nail removal and subsequent right total hip replacement with ORIF.  Patient is here for physical and occupational therapy services.  Patient's surgical procedure took place [facility] in [place] on February 9, 2023 by Dr. Teofilo Lewis.

## 2023-02-10 NOTE — PLAN OF CARE
Problem: Occupational Therapy  Goal: Occupational Therapy Goal  Description: STG: within 2 weeks  Pt will perform grooming with setup at sinkside  Pt will bathe with min a at sinkside  Pt will perform UE dressing with svn at sinkside  Pt will perform LE dressing with min a sinkside  Pt will sit EOB x 30 min with min assistance  Pt will transfer bed/chair/bsc with SBA and RW  Pt will perform standing task x 3 min with CGA/SBA assistance  Pt will tolerate 35 minutes of tx without fatigue      LT.Restore to max I with self care and mobility.     Outcome: Ongoing, Progressing

## 2023-02-11 LAB
ANION GAP SERPL CALCULATED.3IONS-SCNC: 5 MMOL/L (ref 7–16)
BASOPHILS # BLD AUTO: 0.04 K/UL (ref 0–0.2)
BASOPHILS NFR BLD AUTO: 0.4 % (ref 0–1)
BUN SERPL-MCNC: 12 MG/DL (ref 7–18)
BUN/CREAT SERPL: 16 (ref 6–20)
CALCIUM SERPL-MCNC: 8.5 MG/DL (ref 8.5–10.1)
CHLORIDE SERPL-SCNC: 98 MMOL/L (ref 98–107)
CO2 SERPL-SCNC: 34 MMOL/L (ref 21–32)
CREAT SERPL-MCNC: 0.77 MG/DL (ref 0.55–1.02)
DIFFERENTIAL METHOD BLD: ABNORMAL
EGFR (NO RACE VARIABLE) (RUSH/TITUS): 78 ML/MIN/1.73M²
EOSINOPHIL # BLD AUTO: 0.29 K/UL (ref 0–0.5)
EOSINOPHIL NFR BLD AUTO: 2.7 % (ref 1–4)
ERYTHROCYTE [DISTWIDTH] IN BLOOD BY AUTOMATED COUNT: 16.3 % (ref 11.5–14.5)
GLUCOSE SERPL-MCNC: 116 MG/DL (ref 74–106)
GLUCOSE SERPL-MCNC: 138 MG/DL (ref 70–105)
GLUCOSE SERPL-MCNC: 166 MG/DL (ref 70–105)
GLUCOSE SERPL-MCNC: 178 MG/DL (ref 70–105)
HCT VFR BLD AUTO: 26.1 % (ref 38–47)
HGB BLD-MCNC: 7.9 G/DL (ref 12–16)
LYMPHOCYTES # BLD AUTO: 1.3 K/UL (ref 1–4.8)
LYMPHOCYTES NFR BLD AUTO: 12.1 % (ref 27–41)
MCH RBC QN AUTO: 28.6 PG (ref 27–31)
MCHC RBC AUTO-ENTMCNC: 30.3 G/DL (ref 32–36)
MCV RBC AUTO: 94.6 FL (ref 80–96)
MONOCYTES # BLD AUTO: 1.19 K/UL (ref 0–0.8)
MONOCYTES NFR BLD AUTO: 11.1 % (ref 2–6)
MPC BLD CALC-MCNC: 9.4 FL (ref 9.4–12.4)
NEUTROPHILS # BLD AUTO: 7.92 K/UL (ref 1.8–7.7)
NEUTROPHILS NFR BLD AUTO: 73.7 % (ref 53–65)
PLATELET # BLD AUTO: 336 K/UL (ref 150–400)
POTASSIUM SERPL-SCNC: 4.3 MMOL/L (ref 3.5–5.1)
RBC # BLD AUTO: 2.76 M/UL (ref 4.2–5.4)
SODIUM SERPL-SCNC: 133 MMOL/L (ref 136–145)
WBC # BLD AUTO: 10.74 K/UL (ref 4.5–11)

## 2023-02-11 PROCEDURE — 85025 COMPLETE CBC W/AUTO DIFF WBC: CPT | Performed by: HOSPITALIST

## 2023-02-11 PROCEDURE — 25000003 PHARM REV CODE 250

## 2023-02-11 PROCEDURE — 25000003 PHARM REV CODE 250: Performed by: HOSPITALIST

## 2023-02-11 PROCEDURE — 80048 BASIC METABOLIC PNL TOTAL CA: CPT | Performed by: HOSPITALIST

## 2023-02-11 PROCEDURE — 63600175 PHARM REV CODE 636 W HCPCS

## 2023-02-11 PROCEDURE — 27000221 HC OXYGEN, UP TO 24 HOURS

## 2023-02-11 PROCEDURE — 11000004 HC SNF PRIVATE

## 2023-02-11 PROCEDURE — 94761 N-INVAS EAR/PLS OXIMETRY MLT: CPT

## 2023-02-11 PROCEDURE — 82962 GLUCOSE BLOOD TEST: CPT

## 2023-02-11 PROCEDURE — 99900035 HC TECH TIME PER 15 MIN (STAT)

## 2023-02-11 RX ADMIN — SENNOSIDES AND DOCUSATE SODIUM 1 TABLET: 8.6; 5 TABLET ORAL at 08:02

## 2023-02-11 RX ADMIN — PROCHLORPERAZINE EDISYLATE 2.5 MG: 5 INJECTION INTRAMUSCULAR; INTRAVENOUS at 05:02

## 2023-02-11 RX ADMIN — OXYCODONE HYDROCHLORIDE 10 MG: 10 TABLET ORAL at 03:02

## 2023-02-11 RX ADMIN — WARFARIN SODIUM 4 MG: 1 TABLET ORAL at 05:02

## 2023-02-11 RX ADMIN — FUROSEMIDE 40 MG: 40 TABLET ORAL at 08:02

## 2023-02-11 RX ADMIN — METFORMIN HYDROCHLORIDE 1000 MG: 500 TABLET, FILM COATED ORAL at 08:02

## 2023-02-11 RX ADMIN — DIGOXIN 0.12 MG: 125 TABLET ORAL at 08:02

## 2023-02-11 RX ADMIN — AMIODARONE HYDROCHLORIDE 200 MG: 200 TABLET ORAL at 09:02

## 2023-02-11 RX ADMIN — OXYCODONE HYDROCHLORIDE 10 MG: 10 TABLET ORAL at 09:02

## 2023-02-11 RX ADMIN — OXYCODONE HYDROCHLORIDE 10 MG: 10 TABLET ORAL at 04:02

## 2023-02-11 RX ADMIN — ATORVASTATIN CALCIUM 40 MG: 40 TABLET, FILM COATED ORAL at 09:02

## 2023-02-11 RX ADMIN — FAMOTIDINE 20 MG: 20 TABLET, FILM COATED ORAL at 08:02

## 2023-02-11 RX ADMIN — LEVOTHYROXINE SODIUM 25 MCG: 0.03 TABLET ORAL at 05:02

## 2023-02-11 RX ADMIN — AMIODARONE HYDROCHLORIDE 200 MG: 200 TABLET ORAL at 08:02

## 2023-02-11 RX ADMIN — FAMOTIDINE 20 MG: 20 TABLET, FILM COATED ORAL at 09:02

## 2023-02-11 RX ADMIN — PREDNISONE 5 MG: 5 TABLET ORAL at 08:02

## 2023-02-11 NOTE — NURSING
Nurses Note -- 4 Eyes      2/10/2023   6:10 PM      Skin assessed during: Admit      [] No Pressure Injuries Present    []Prevention Measures Documented      [x] Yes- Altered Skin Integrity Present or Discovered   [x] LDA Added if Not in Epic (Describe Wound)   [x] New Altered Skin Integrity was Present on Admit and Documented in LDA   [x] Wound Image Taken    Wound Care Consulted? Yes    Attending Nurse:  Rosangela Carter RN     Second RN/Staff Member:  Kina Hassan RN

## 2023-02-11 NOTE — PLAN OF CARE
Problem: Adult Inpatient Plan of Care  Goal: Plan of Care Review  Outcome: Ongoing, Progressing  Goal: Patient-Specific Goal (Individualized)  Outcome: Ongoing, Progressing  Goal: Absence of Hospital-Acquired Illness or Injury  Outcome: Ongoing, Progressing  Goal: Optimal Comfort and Wellbeing  Outcome: Ongoing, Progressing  Goal: Readiness for Transition of Care  Outcome: Ongoing, Progressing     Problem: Diabetes Comorbidity  Goal: Blood Glucose Level Within Targeted Range  Outcome: Ongoing, Progressing     Problem: Fall Injury Risk  Goal: Absence of Fall and Fall-Related Injury  Outcome: Ongoing, Progressing     Problem: Pain Acute  Goal: Acceptable Pain Control and Functional Ability  Outcome: Ongoing, Progressing     Problem: Impaired Wound Healing  Goal: Optimal Wound Healing  Outcome: Ongoing, Progressing     Problem: Skin Injury Risk Increased  Goal: Skin Health and Integrity  Outcome: Ongoing, Progressing     Problem: Gas Exchange Impaired  Goal: Optimal Gas Exchange  Outcome: Ongoing, Progressing

## 2023-02-11 NOTE — NURSING
Patient is upset due to the fact she was not able to get her pain medication 4 hours after she had her last dose. Medication is schedule q6h prn; patient states she was getting 2 pills at the other hospital. I explained that the other hospital was giving her the same dosage as we are giving her, only we had 10 mg tablets and the other hospital had 5 mg tablets. Although, pt v/u; her body language suggested otherwise. I will continue to monitor the patient

## 2023-02-12 LAB
GLUCOSE SERPL-MCNC: 148 MG/DL (ref 70–105)
GLUCOSE SERPL-MCNC: 169 MG/DL (ref 70–105)
GLUCOSE SERPL-MCNC: 184 MG/DL (ref 70–105)
GLUCOSE SERPL-MCNC: 217 MG/DL (ref 70–105)

## 2023-02-12 PROCEDURE — 11000004 HC SNF PRIVATE

## 2023-02-12 PROCEDURE — 25000003 PHARM REV CODE 250: Performed by: HOSPITALIST

## 2023-02-12 PROCEDURE — 63600175 PHARM REV CODE 636 W HCPCS

## 2023-02-12 PROCEDURE — 25000003 PHARM REV CODE 250

## 2023-02-12 PROCEDURE — 27000221 HC OXYGEN, UP TO 24 HOURS

## 2023-02-12 PROCEDURE — 82962 GLUCOSE BLOOD TEST: CPT

## 2023-02-12 RX ORDER — MICONAZOLE NITRATE 2 %
POWDER (GRAM) TOPICAL 2 TIMES DAILY
Status: COMPLETED | OUTPATIENT
Start: 2023-02-12 | End: 2023-02-22

## 2023-02-12 RX ORDER — PROCHLORPERAZINE EDISYLATE 5 MG/ML
5 INJECTION INTRAMUSCULAR; INTRAVENOUS EVERY 6 HOURS PRN
Status: DISCONTINUED | OUTPATIENT
Start: 2023-02-12 | End: 2023-03-10 | Stop reason: HOSPADM

## 2023-02-12 RX ADMIN — OXYCODONE HYDROCHLORIDE 10 MG: 10 TABLET ORAL at 09:02

## 2023-02-12 RX ADMIN — LEVOTHYROXINE SODIUM 25 MCG: 0.03 TABLET ORAL at 05:02

## 2023-02-12 RX ADMIN — OXYCODONE HYDROCHLORIDE 10 MG: 10 TABLET ORAL at 10:02

## 2023-02-12 RX ADMIN — PROCHLORPERAZINE EDISYLATE 5 MG: 5 INJECTION INTRAMUSCULAR; INTRAVENOUS at 09:02

## 2023-02-12 RX ADMIN — PREDNISONE 5 MG: 5 TABLET ORAL at 08:02

## 2023-02-12 RX ADMIN — WARFARIN SODIUM 4 MG: 1 TABLET ORAL at 04:02

## 2023-02-12 RX ADMIN — FAMOTIDINE 20 MG: 20 TABLET, FILM COATED ORAL at 08:02

## 2023-02-12 RX ADMIN — DIGOXIN 0.12 MG: 125 TABLET ORAL at 08:02

## 2023-02-12 RX ADMIN — ATORVASTATIN CALCIUM 40 MG: 40 TABLET, FILM COATED ORAL at 08:02

## 2023-02-12 RX ADMIN — AMIODARONE HYDROCHLORIDE 200 MG: 200 TABLET ORAL at 08:02

## 2023-02-12 RX ADMIN — MELATONIN 6 MG: at 10:02

## 2023-02-12 RX ADMIN — OXYCODONE HYDROCHLORIDE 10 MG: 10 TABLET ORAL at 04:02

## 2023-02-12 RX ADMIN — OXYCODONE HYDROCHLORIDE 10 MG: 10 TABLET ORAL at 03:02

## 2023-02-12 RX ADMIN — PROCHLORPERAZINE EDISYLATE 5 MG: 5 INJECTION INTRAMUSCULAR; INTRAVENOUS at 04:02

## 2023-02-12 RX ADMIN — FUROSEMIDE 40 MG: 40 TABLET ORAL at 08:02

## 2023-02-12 RX ADMIN — METFORMIN HYDROCHLORIDE 1000 MG: 500 TABLET, FILM COATED ORAL at 08:02

## 2023-02-12 RX ADMIN — MICONAZOLE NITRATE 2 % TOPICAL POWDER: at 04:02

## 2023-02-12 NOTE — NURSING
"Ms Rojas is not happy about her "pain medicine schedule".  She is reporting severe pain at this very minute and wants her Oxy.  When told that it would be 2130 before it is available.  She asked, "just how often do they have my pain med scheduled?".  When told 6 hours she said that she needs to talk to someone and explain to them that she has been to pain management for years and requires pain medicine more often and that she is used to getting 2 pills instead of the one pill.  I explained that she is getting oxy 10 instead of 2 5"s.  She still says that she will be talking to someone to get this changed.  I assured her that as soon as I could pull another pain pill that I would do so.  "

## 2023-02-12 NOTE — NURSING
Pt BS @1033 was 217. Pt hasn't been able to keep anything down orally without it coming right back up. No sliding scale insulin was given d/t risk of getting hypoglycemic and poor oral intake. Compazine 5mg IM given to pt shortly after which seemed to have helped. Will continue to monitor.

## 2023-02-12 NOTE — PT/OT/SLP EVAL
ST SCREEN      SLP screen only at this time. No skilled ST services warranted. Reconsult upon change in status.     Yanni Richard M.S. [facility]-SLP

## 2023-02-12 NOTE — PLAN OF CARE
Problem: Adult Inpatient Plan of Care  Goal: Plan of Care Review  Outcome: Ongoing, Progressing  Goal: Patient-Specific Goal (Individualized)  Outcome: Ongoing, Progressing  Goal: Absence of Hospital-Acquired Illness or Injury  Outcome: Ongoing, Progressing  Goal: Optimal Comfort and Wellbeing  Outcome: Ongoing, Progressing  Goal: Readiness for Transition of Care  Outcome: Ongoing, Progressing     Problem: Diabetes Comorbidity  Goal: Blood Glucose Level Within Targeted Range  Outcome: Ongoing, Progressing  Intervention: Monitor and Manage Glycemia  Flowsheets (Taken 2/12/2023 8218)  Glycemic Management: blood glucose monitored     Problem: Fall Injury Risk  Goal: Absence of Fall and Fall-Related Injury  Outcome: Ongoing, Progressing     Problem: Pain Acute  Goal: Acceptable Pain Control and Functional Ability  Outcome: Ongoing, Not Progressing     Problem: Skin Injury Risk Increased  Goal: Skin Health and Integrity  Outcome: Ongoing, Progressing

## 2023-02-12 NOTE — PLAN OF CARE
Problem: Adult Inpatient Plan of Care  Goal: Optimal Comfort and Wellbeing  Outcome: Ongoing, Progressing  Goal: Readiness for Transition of Care  Outcome: Ongoing, Progressing     Problem: Diabetes Comorbidity  Goal: Blood Glucose Level Within Targeted Range  Outcome: Ongoing, Progressing

## 2023-02-13 PROBLEM — Z98.890 STATUS POST HIP SURGERY: Status: ACTIVE | Noted: 2023-02-13

## 2023-02-13 PROBLEM — G89.4 CHRONIC PAIN SYNDROME: Status: ACTIVE | Noted: 2023-02-13

## 2023-02-13 LAB
GLUCOSE SERPL-MCNC: 149 MG/DL (ref 70–105)
GLUCOSE SERPL-MCNC: 172 MG/DL (ref 70–105)
GLUCOSE SERPL-MCNC: 182 MG/DL (ref 70–105)
GLUCOSE SERPL-MCNC: 204 MG/DL (ref 70–105)

## 2023-02-13 PROCEDURE — 82962 GLUCOSE BLOOD TEST: CPT

## 2023-02-13 PROCEDURE — 63600175 PHARM REV CODE 636 W HCPCS

## 2023-02-13 PROCEDURE — 99305 PR NURSING FACILITY CARE, INIT, MOD SEVERITY: ICD-10-PCS | Mod: AI,,, | Performed by: HOSPITALIST

## 2023-02-13 PROCEDURE — 25000003 PHARM REV CODE 250: Performed by: HOSPITALIST

## 2023-02-13 PROCEDURE — 97530 THERAPEUTIC ACTIVITIES: CPT

## 2023-02-13 PROCEDURE — 25000003 PHARM REV CODE 250: Performed by: NURSE PRACTITIONER

## 2023-02-13 PROCEDURE — 27000221 HC OXYGEN, UP TO 24 HOURS

## 2023-02-13 PROCEDURE — 11000004 HC SNF PRIVATE

## 2023-02-13 PROCEDURE — 99900035 HC TECH TIME PER 15 MIN (STAT)

## 2023-02-13 PROCEDURE — 97110 THERAPEUTIC EXERCISES: CPT

## 2023-02-13 PROCEDURE — 99305 1ST NF CARE MODERATE MDM 35: CPT | Mod: AI,,, | Performed by: HOSPITALIST

## 2023-02-13 PROCEDURE — 25000003 PHARM REV CODE 250

## 2023-02-13 RX ORDER — ROPINIROLE 0.25 MG/1
0.25 TABLET, FILM COATED ORAL 2 TIMES DAILY
Status: DISCONTINUED | OUTPATIENT
Start: 2023-02-13 | End: 2023-03-10 | Stop reason: HOSPADM

## 2023-02-13 RX ORDER — ADHESIVE BANDAGE
30 BANDAGE TOPICAL DAILY PRN
Status: DISCONTINUED | OUTPATIENT
Start: 2023-02-13 | End: 2023-03-10 | Stop reason: HOSPADM

## 2023-02-13 RX ORDER — ONDANSETRON HYDROCHLORIDE 4 MG/5ML
4 SOLUTION ORAL ONCE
Status: COMPLETED | OUTPATIENT
Start: 2023-02-13 | End: 2023-02-13

## 2023-02-13 RX ADMIN — MAGNESIUM HYDROXIDE 2400 MG: 1200 LIQUID ORAL at 04:02

## 2023-02-13 RX ADMIN — METFORMIN HYDROCHLORIDE 1000 MG: 500 TABLET, FILM COATED ORAL at 08:02

## 2023-02-13 RX ADMIN — AMIODARONE HYDROCHLORIDE 200 MG: 200 TABLET ORAL at 08:02

## 2023-02-13 RX ADMIN — FAMOTIDINE 20 MG: 20 TABLET, FILM COATED ORAL at 09:02

## 2023-02-13 RX ADMIN — ZINC OXIDE TOPICAL OINT.: at 02:02

## 2023-02-13 RX ADMIN — ROPINIROLE 0.25 MG: 0.25 TABLET, FILM COATED ORAL at 12:02

## 2023-02-13 RX ADMIN — PROCHLORPERAZINE EDISYLATE 5 MG: 5 INJECTION INTRAMUSCULAR; INTRAVENOUS at 05:02

## 2023-02-13 RX ADMIN — SENNOSIDES AND DOCUSATE SODIUM 1 TABLET: 8.6; 5 TABLET ORAL at 08:02

## 2023-02-13 RX ADMIN — ONDANSETRON HYDROCHLORIDE 4 MG: 4 SOLUTION ORAL at 08:02

## 2023-02-13 RX ADMIN — PROCHLORPERAZINE EDISYLATE 5 MG: 5 INJECTION INTRAMUSCULAR; INTRAVENOUS at 10:02

## 2023-02-13 RX ADMIN — FAMOTIDINE 20 MG: 20 TABLET, FILM COATED ORAL at 08:02

## 2023-02-13 RX ADMIN — FUROSEMIDE 40 MG: 40 TABLET ORAL at 08:02

## 2023-02-13 RX ADMIN — OXYCODONE HYDROCHLORIDE 10 MG: 10 TABLET ORAL at 10:02

## 2023-02-13 RX ADMIN — DIGOXIN 0.12 MG: 125 TABLET ORAL at 08:02

## 2023-02-13 RX ADMIN — ATORVASTATIN CALCIUM 40 MG: 40 TABLET, FILM COATED ORAL at 09:02

## 2023-02-13 RX ADMIN — MICONAZOLE NITRATE 2 % TOPICAL POWDER: at 08:02

## 2023-02-13 RX ADMIN — ROPINIROLE 0.25 MG: 0.25 TABLET, FILM COATED ORAL at 09:02

## 2023-02-13 RX ADMIN — PREDNISONE 5 MG: 5 TABLET ORAL at 08:02

## 2023-02-13 RX ADMIN — LEVOTHYROXINE SODIUM 25 MCG: 0.03 TABLET ORAL at 05:02

## 2023-02-13 RX ADMIN — OXYCODONE HYDROCHLORIDE 10 MG: 10 TABLET ORAL at 04:02

## 2023-02-13 RX ADMIN — SODIUM CHLORIDE 250 ML: 9 INJECTION, SOLUTION INTRAVENOUS at 08:02

## 2023-02-13 NOTE — H&P
[facility]  [facility] Medicine  History & Physical    Patient Name: Karina Rojas  MRN: [id number]  Patient Class: IP- Swing  Admission Date: 2/10/2023  Attending Physician: Jorge Buckley DO   Primary Care Provider: Thalia Bowie NP         Patient information was obtained from patient, past medical records and ER records.     Subjective:     Principal Problem:Status post hip surgery    Chief Complaint:   Chief Complaint   Patient presents with    Hip Injury     Right total knee replacement, removal of right IM nail        HPI: Patient is a 82-year-old ill-appearing  female who is admitted to [facility] for skilled nursing services status post right intratrochanteric for mole nail removal and subsequent right total hip replacement with ORIF.  Patient is here for physical and occupational therapy services.  Patient's surgical procedure took place [facility] in [place] on February 9, 2023 by Dr. Teofilo Lewis.      Past Medical History:   Diagnosis Date    Carotid artery stenosis     Chronic respiratory failure with hypoxia     COPD (chronic obstructive pulmonary disease)     Diabetes mellitus     DVT (deep venous thrombosis)     Heart failure     High cholesterol     Hypertension     Hypothyroidism     Paroxysmal atrial fibrillation        Past Surgical History:   Procedure Laterality Date    APPENDECTOMY      BACK SURGERY  1980 1984    BILATERAL TUBAL LIGATION  1994    CARDIAC SURGERY  1990    aorta bypass    caritod Left     HERNIA REPAIR      intestine repair         Review of patient's allergies indicates:   Allergen Reactions    Tylenol [acetaminophen] Anaphylaxis and Shortness Of Breath       Current Facility-Administered Medications on File Prior to Encounter   Medication    [DISCONTINUED] GENERIC EXTERNAL MEDICATION     Current Outpatient Medications on File Prior to Encounter   Medication Sig     amiodarone (PACERONE) 200 MG Tab Take 200 mg by mouth 2 (two) times daily. Take 1 tablet by mouth in the morning and 1 tablet before bedtime.    atorvastatin (LIPITOR) 40 MG tablet Take 40 mg by mouth.    digoxin (LANOXIN) 125 mcg tablet Take 125 mcg by mouth.    furosemide (LASIX) 40 MG tablet Take 40 mg by mouth once daily.    levothyroxine (SYNTHROID) 50 MCG tablet Take 1 tablet (50 mcg total) by mouth before breakfast. (Patient taking differently: Take 25 mcg by mouth before breakfast.)    metFORMIN (GLUCOPHAGE) 1000 MG tablet Take 1,000 mg by mouth daily with breakfast.    oxyCODONE (OXYCONTIN) 10 mg 12 hr tablet Take 10 mg by mouth every 6 (six) hours as needed for Pain.    predniSONE (DELTASONE) 5 MG tablet Take 5 mg by mouth.    warfarin (COUMADIN) 4 MG tablet Take 4 mg by mouth. Take 1 tablet by mouth 4 times a week.    fluticasone/umeclidin/vilanter (TRELEGY ELLIPTA INHL) Inhale into the lungs daily as needed.    warfarin (COUMADIN) 5 MG tablet Take by mouth.     Family History    None       Tobacco Use    Smoking status: Never    Smokeless tobacco: Never   Substance and Sexual Activity    Alcohol use: Not on file    Drug use: Not on file    Sexual activity: Not on file     Review of Systems   Constitutional:  Negative for appetite change, chills and fever.   Respiratory:  Negative for cough, shortness of breath and wheezing.    Cardiovascular:  Negative for chest pain, palpitations and leg swelling.   Gastrointestinal:  Negative for abdominal distention, diarrhea, nausea and vomiting.   Genitourinary:  Negative for dysuria.   Musculoskeletal:         Bilateral foot shooting pain    Skin:  Negative for rash.   Neurological:  Negative for dizziness, seizures and syncope.   Psychiatric/Behavioral:  Negative for agitation, behavioral problems and confusion.    All other systems reviewed and are negative.  Objective:     Vital Signs (Most Recent):  Temp: 97.7 °F (36.5 °C) (02/10/23 1249)  Pulse:  81 (02/10/23 1249)  Resp: (!) 25 (02/10/23 1249)  BP: (!) 100/50 (02/10/23 1249)  SpO2: (!) 93 % (02/10/23 1249)   Vital Signs (24h Range):  Temp:  [97.7 °F (36.5 °C)] 97.7 °F (36.5 °C)  Pulse:  [81] 81  Resp:  [25] 25  SpO2:  [93 %] 93 %  BP: (100)/(50) 100/50        There is no height or weight on file to calculate BMI.    Physical Exam  Vitals reviewed.   Constitutional:       General: She is not in acute distress.     Appearance: Normal appearance.   HENT:      Head: Normocephalic and atraumatic.   Eyes:      General: No scleral icterus.     Extraocular Movements: Extraocular movements intact.      Conjunctiva/sclera: Conjunctivae normal.      Pupils: Pupils are equal, round, and reactive to light.   Cardiovascular:      Rate and Rhythm: Normal rate and regular rhythm.      Heart sounds: No murmur heard.    No friction rub. No gallop.   Pulmonary:      Effort: Pulmonary effort is normal. No respiratory distress.      Breath sounds: Normal breath sounds. No wheezing or rales.   Abdominal:      General: Abdomen is flat. Bowel sounds are normal. There is no distension.      Palpations: Abdomen is soft.      Tenderness: There is no abdominal tenderness. There is no guarding.   Musculoskeletal:         General: No swelling.      Comments: Hip pain ok    Skin:     General: Skin is warm and dry.      Coloration: Skin is not jaundiced.      Findings: No rash.   Neurological:      General: No focal deficit present.      Mental Status: She is alert and oriented to person, place, and time.      Sensory: No sensory deficit.      Motor: Weakness present.   Psychiatric:         Mood and Affect: Mood normal.         Behavior: Behavior normal.         Thought Content: Thought content normal.         CRANIAL NERVES     CN III, IV, VI   Pupils are equal, round, and reactive to light.     Significant Labs: All pertinent labs within the past 24 hours have been reviewed.  Recent Lab Results         02/13/23  1042   02/13/23  0597    02/12/23 2039 02/12/23  1634        POC Glucose 182   149   148   184               Significant Imaging: I have reviewed all pertinent imaging results/findings within the past 24 hours.    Assessment/Plan:     * Status post hip surgery  Monitor pain and progress with therapy       Chronic pain syndrome  Monitor while on pain meds.  Will not escalate at this time.       Diabetes  Patient's FSGs are controlled on current medication regimen.  Last A1c reviewed-   Lab Results   Component Value Date    HGBA1C 6.7 (H) 02/06/2023     Most recent fingerstick glucose reviewed- No results for input(s): POCTGLUCOSE in the last 24 hours.  Current correctional scale  Low  Maintain anti-hyperglycemic dose as follows-   Antihyperglycemics (From admission, onward)    Start     Stop Route Frequency Ordered    02/11/23 0745  metFORMIN tablet 1,000 mg         -- Oral With breakfast 02/10/23 1317    02/10/23 1514  insulin aspart U-100 injection 0-5 Units         -- SubQ Before meals & nightly PRN 02/10/23 1415        Hold Oral hypoglycemics while patient is in the hospital.    Atrial fibrillation  Patient with Long standing persistent (>12 months) atrial fibrillation which is controlled currently with Beta Blocker. Patient is currently in sinus rhythm.[id number] Score: 4. HASBLED Score: . Anticoagulation         HTN (hypertension)  Continue home meds and adjust as needed.       VTE Risk Mitigation (From admission, onward)         Ordered     warfarin (COUMADIN) tablet 4 mg  Every Tues, Thurs, Sat, Sun         02/10/23 1317     IP VTE HIGH RISK PATIENT  Once         02/10/23 1311     Place sequential compression device  Until discontinued         02/10/23 1311                   Jorge Buckley DO  Department of Hospital Medicine   [facility]

## 2023-02-13 NOTE — ASSESSMENT & PLAN NOTE
Patient's FSGs are controlled on current medication regimen.  Last A1c reviewed-   Lab Results   Component Value Date    HGBA1C 6.7 (H) 02/06/2023     Most recent fingerstick glucose reviewed- No results for input(s): POCTGLUCOSE in the last 24 hours.  Current correctional scale  Low  Maintain anti-hyperglycemic dose as follows-   Antihyperglycemics (From admission, onward)    Start     Stop Route Frequency Ordered    02/11/23 0745  metFORMIN tablet 1,000 mg         -- Oral With breakfast 02/10/23 1317    02/10/23 1514  insulin aspart U-100 injection 0-5 Units         -- SubQ Before meals & nightly PRN 02/10/23 1415        Hold Oral hypoglycemics while patient is in the hospital.

## 2023-02-13 NOTE — PT/OT/SLP PROGRESS
Physical Therapy Treatment    Patient Name:  Karina Rojas   MRN:  [id number]    Recommendations:     Discharge Recommendations: home health PT  Discharge Equipment Recommendations: none  Barriers to discharge: Inaccessible home and Decreased caregiver support    Assessment:     Karina Rojas is a 82 y.o. female admitted with a medical diagnosis of Status post hip surgery.  She presents with the following impairments/functional limitations: weakness, impaired endurance, gait instability, impaired balance, decreased lower extremity function, pain, orthopedic precautions . Pt gradually improving with ambulation. She is still limited by nausea, but improved.    Rehab Prognosis: Good; patient would benefit from acute skilled PT services to address these deficits and reach maximum level of function.    Recent Surgery: * No surgery found *      Plan:     During this hospitalization, patient to be seen BID to address the identified rehab impairments via gait training, therapeutic activities, therapeutic exercises and progress toward the following goals:    Plan of Care Expires:  03/24/23    Subjective     Chief Complaint: Pt no longer c/o right calf pain, but still c/o left calf pain. Right hip pain only minimal. States Dr. Buckley told her the calf pain may be due to RLS.  Patient/Family Comments/goals: DC home upon dc from hosp with home health.  Pain/Comfort:  Pain Rating 1: 3/10  Location - Side 1: Right  Location - Orientation 1: lower  Location 1: hip  Pain Addressed 1: Pre-medicate for activity  Pain Rating Post-Intervention 1: 0/10  Pain Rating 2: 9/10  Location - Side 2: Left  Location - Orientation 2: lower  Location 2: calf  Pain Addressed 2: Pre-medicate for activity  Pain Rating Post-Intervention 2: 0/10      Objective:     Communicated with OT prior to session.  Patient found HOB elevated with oxygen upon PT entry to room.     General Precautions: Standard, fall  Orthopedic Precautions: RLE weight bearing  as tolerated, RLE posterior precautions (No active abduction RLE)  Braces:    Respiratory Status: Nasal cannula, flow 2 L/min     Functional Mobility:  Bed Mobility:     Rolling Left:  stand by assistance  Scooting: stand by assistance  Supine to Sit: minimum assistance  Sit to Supine: minimum assistance  Transfers:     Sit to Stand:  minimum assistance with rolling walker  Gait: Pt amb'd along EOB, per her request due to nausea, Right and Left for total of 5 ft with min A..      AM-PAC 6 CLICK MOBILITY  Turning over in bed (including adjusting bedclothes, sheets and blankets)?: 4  Sitting down on and standing up from a chair with arms (e.g., wheelchair, bedside commode, etc.): 3  Moving from lying on back to sitting on the side of the bed?: 3  Moving to and from a bed to a chair (including a wheelchair)?: 3  Need to walk in hospital room?: 3  Climbing 3-5 steps with a railing?: 1  Basic Mobility Total Score: 17       Treatment & Education:  Bed ex's RLE X 15 reps: HS, SAQ, hip IR to neutral, GS, LAQ. Pt instructed to sit with hips higher than knees which PT demonstrated. Instructed to lean left during STS transfer to avoid bending hip past 90 deg. Reviewed all SHYANNE px's with pt expressing/demonstrating understanding. Pt amb'd x 5 ft along EOB as stated above. Pt returned to supine position with min A due to MD orders to no actively abduct right hip. Pt has f/u with surgeon 3/1/23 at 1230.    Patient left HOB elevated with all lines intact..    GOALS:   Multidisciplinary Problems       Physical Therapy Goals          Problem: Physical Therapy    Goal Priority Disciplines Outcome Goal Variances Interventions   Physical Therapy Goal     PT, PT/OT Ongoing, Progressing     Description: STG - 3 weeks  1. Pt min A x 1 with bed mob.  2. Pt min A x 1  with transfers using RW.  3. Pt will amb 25 ft with min A x 1 using RW.  4. Pt will have 3/5 RLE strength.  5. Pt will recall 3/3 SHYANNE precautions.    LTG - 6 weeks  1. Pt SBA  with bed mob.  2. Pt SBA with transfers using RW.  3. Pt will amb 50 ft with SBA using RW.  4. Pt will have 4-/5 RLE strength.                       Time Tracking:     PT Received On: 02/13/23  PT Start Time: 1511     PT Stop Time: 1534  PT Total Time (min): 23 min     Billable Minutes: Gait Training 2, Therapeutic Activity 9, Therapeutic Exercise 12, and Total Time 23 min    Treatment Type: Treatment  PT/PTA: PT     PTA Visit Number: 0     02/13/2023

## 2023-02-13 NOTE — ASSESSMENT & PLAN NOTE
Patient with Long standing persistent (>12 months) atrial fibrillation which is controlled currently with Beta Blocker. Patient is currently in sinus rhythm.[id number] Score: 4. HASBLED Score: . Anticoagulation

## 2023-02-13 NOTE — PT/OT/SLP PROGRESS
"Physical Therapy Treatment    Patient Name:  Karina Rojas   MRN:  [id number]    Recommendations:     Discharge Recommendations: home health PT  Discharge Equipment Recommendations: none  Barriers to discharge: Inaccessible home and Decreased caregiver support    Assessment:     Karina Rojas is a 82 y.o. female admitted with a medical diagnosis of <principal problem not specified>.  She presents with the following impairments/functional limitations: weakness, gait instability, impaired functional mobility, impaired balance, decreased lower extremity function, pain, decreased ROM, orthopedic precautions . Eval completed due to pt less nauseous. Pt did complain of aleah calf pain, but was cleared by nursing to proceed with treatment.    Rehab Prognosis: Fair; patient would benefit from acute skilled PT services to address these deficits and reach maximum level of function.    Recent Surgery: * No surgery found *      SUPERVISORY VISIT performed with KRISTOPHER Ann PTA to discuss results of eval, POC, precautions, goals. Understanding expressed.  Plan:     During this hospitalization, patient to be seen BID to address the identified rehab impairments via gait training, therapeutic activities, therapeutic exercises and progress toward the following goals:    Plan of Care Expires:  03/24/23    Subjective     Chief Complaint: Pt c/o aleah calf pain. States, "it hurts to even touch the skin".   Patient/Family Comments/goals: Pt to dc home with home health when able.  Pain/Comfort:  Pain Rating 1: 9/10  Location - Side 1: Bilateral  Location - Orientation 1: lower  Location 1: calf  Pain Addressed 1: Reposition  Pain Rating Post-Intervention 1: 9/10  Pain Rating 2: 0/10  Location - Side 2: Right  Location - Orientation 2: lower  Location 2: hip  Pain Addressed 2: Pre-medicate for activity      Objective:     Communicated with Jeny Carter RN prior to session.  She stated since pt was on Warfarin, pt cleared to work with PT " despite c/o aleah calf pain. Patient found HOB elevated with oxygen upon PT entry to room.     General Precautions: Standard, fall  Orthopedic Precautions: RLE weight bearing as tolerated, RLE posterior precautions (No active right hip abduction)  Braces:    Respiratory Status: Nasal cannula, flow 2 L/min     Functional Mobility:  Bed Mobility:     Rolling Left:  stand by assistance  Scooting: stand by assistance and scooting fwd/lat.  Supine to Sit: minimum assistance  Sit to Supine: minimum assistance  Transfers:     Sit to Stand:  minimum assistance with pt held onto bedside chair.  Gait: Pt amb'd 2 ft laterally to the left along EOB with min Ax1 using bedside chair for support. PT contacted RT to issue pt a longer length of O2 tubing.      AM-PAC 6 CLICK MOBILITY  Turning over in bed (including adjusting bedclothes, sheets and blankets)?: 3  Sitting down on and standing up from a chair with arms (e.g., wheelchair, bedside commode, etc.): 3  Moving from lying on back to sitting on the side of the bed?: 3  Moving to and from a bed to a chair (including a wheelchair)?: 3  Need to walk in hospital room?: 3  Climbing 3-5 steps with a railing?: 1  Basic Mobility Total Score: 16       Treatment & Education:  Bed ex's x 10 reps each RLE: HS, GS, AP, SAQ, LAQ.    Patient left HOB elevated with all lines intact and NSG notified..    GOALS:   Multidisciplinary Problems       Physical Therapy Goals          Problem: Physical Therapy    Goal Priority Disciplines Outcome Goal Variances Interventions   Physical Therapy Goal     PT, PT/OT Ongoing, Progressing     Description: STG - 3 weeks  1. Pt min A x 1 with bed mob.  2. Pt min A x 1  with transfers using RW.  3. Pt will amb 25 ft with min A x 1 using RW.  4. Pt will have 3/5 RLE strength.  5. Pt will recall 3/3 SHYANNE precautions.    LTG - 6 weeks  1. Pt SBA with bed mob.  2. Pt SBA with transfers using RW.  3. Pt will amb 50 ft with SBA using RW.  4. Pt will have 4-/5 RLE  strength.                       Time Tracking:     PT Received On: 02/13/23  PT Start Time: 0906     PT Stop Time: 0922  PT Total Time (min): 16 min     Billable Minutes: Gait Training 2, Therapeutic Activity 8, Therapeutic Exercise 6, and Total Time 16 min    Treatment Type: Treatment  PT/PTA: PT     PTA Visit Number: 0     02/13/2023

## 2023-02-13 NOTE — PLAN OF CARE
Problem: Adult Inpatient Plan of Care  Goal: Plan of Care Review  Outcome: Ongoing, Progressing  Goal: Patient-Specific Goal (Individualized)  Outcome: Ongoing, Progressing  Goal: Absence of Hospital-Acquired Illness or Injury  Outcome: Ongoing, Progressing  Goal: Optimal Comfort and Wellbeing  Outcome: Ongoing, Progressing  Goal: Readiness for Transition of Care  Outcome: Ongoing, Progressing     Problem: Diabetes Comorbidity  Goal: Blood Glucose Level Within Targeted Range  Outcome: Ongoing, Progressing     Problem: Fall Injury Risk  Goal: Absence of Fall and Fall-Related Injury  Outcome: Ongoing, Progressing     Problem: Pain Acute  Goal: Acceptable Pain Control and Functional Ability  Outcome: Ongoing, Not Progressing     Problem: Impaired Wound Healing  Goal: Optimal Wound Healing  Outcome: Ongoing, Progressing     Problem: Skin Injury Risk Increased  Goal: Skin Health and Integrity  Outcome: Ongoing, Progressing

## 2023-02-13 NOTE — NURSING
Pt called out that she had started vomiting again, I went into pt room and gave her the white emesis bag and emptied around 150cc of emesis out of grey basin. Compazine 5mg IM given @1740. Pt has been unable to go a full 24hrs keeping anything down. N/V comes and goes periodically throughout the day. Instructed pt to call out if she needed anything. Will check to see if compazine has seemed to help in about 30 min.

## 2023-02-13 NOTE — PT/OT/SLP PROGRESS
"Occupational Therapy  Treatment    Karina Rojas   MRN: [id number]   Admitting Diagnosis: Status post hip surgery    OT Date of Treatment: 02/13/23   OT Start Time: 1538  OT Stop Time: 1548  OT Total Time (min): 10 min    Billable Minutes:  Therapeutic Exercise 10    OT/HELENA: OT          General Precautions: Standard, diabetic, fall  Orthopedic Precautions: RLE weight bearing as tolerated  Braces: N/A  Respiratory Status: Nasal cannula, flow 3 L/min         Subjective:  Communicated with pt prior to session.  Pt only agreeable to bedside tx while HOB elevated, stating she is still very nauseated and just finished PT       Objective:        Functional Mobility:  Bed Mobility: na       Transfers: na with OT        Functional Ambulation: see PT    Activities of Daily Living:   NA today    Balance:   Pt refused    Therapeutic Activities and Exercises:  OT instructed, demonstrated, and provided opportunity for pt to return demonstrate and ask questions regarding therabuting HOME EXERCISE PROGRAM program.  Pt performed with 100% accuracy, and she stated she would "try to do these again tonight."  Pt states, "this time the nausea has me throwing up my toenails.  It's worse this time."    AM-PAC 6 CLICK ADL   How much help from another person does this patient currently need?   1 = Unable, Total/Dependent Assistance  2 = A lot, Maximum/Moderate Assistance  3 = A little, Minimum/Contact Guard/Supervision  4 = None, Modified [place]/Independent          AM-PAC Raw Score CMS "G-Code Modifier Level of Impairment Assistance   6 % Total / Unable   7 - 8 CM 80 - 100% Maximal Assist   9-13 CL 60 - 80% Moderate Assist   14 - 19 CK 40 - 60% Moderate Assist   20 - 22 CJ 20 - 40% Minimal Assist   23 CI 1-20% SBA / CGA   24 CH 0% Independent/ Mod I       Patient left HOB elevated with call button in reach    ASSESSMENT:  Karina Rojas is a 82 y.o. female with a medical diagnosis of Status post hip surgery and presents " with continued complaints of nausea; CNA also brought pt some prune juice in to drink due to constipation issues.    Rehab identified problem list/impairments:  weakness, impaired endurance, impaired self care skills, impaired functional mobility, gait instability, impaired balance, decreased lower extremity function, pain, impaired skin, impaired cardiopulmonary response to activity, orthopedic precautions    Rehab potential is good and if pt can participate effectively .    Activity tolerance: Poor at this time due to nausea and pain    Discharge recommendations: home with home health, home health PT, home health OT   Barriers to discharge: Barriers to Discharge: None    Equipment recommendations: hip kit    GOALS:   Multidisciplinary Problems       Occupational Therapy Goals          Problem: Occupational Therapy    Goal Priority Disciplines Outcome Interventions   Occupational Therapy Goal     OT, PT/OT Ongoing, Progressing    Description: STG: within 2 weeks  Pt will perform grooming with setup at sinkside  Pt will bathe with min a at sinkside  Pt will perform UE dressing with svn at sinkside  Pt will perform LE dressing with min a sinkside  Pt will sit EOB x 30 min with min assistance  Pt will transfer bed/chair/bsc with SBA and RW  Pt will perform standing task x 3 min with CGA/SBA assistance  Pt will tolerate 35 minutes of tx without fatigue      LT.Restore to max I with self care and mobility.                          Plan:  Patient to be seen 5 x/week to address the above listed problems via self-care/home management, therapeutic activities, therapeutic exercises, wheelchair management/training  Plan of Care expires: 03/10/23  Plan of Care reviewed with: patient, daughter         2023

## 2023-02-13 NOTE — PROGRESS NOTES
Clinical Pharmacy Chart Review Note      Admit Date: 2/10/2023   LOS: 3 days       Karina Rojas is a 82 y.o. female admitted to SNF for PT/OT after hospitalization for right total hip replacement.    There are no hospital problems to display for this patient.    Review of patient's allergies indicates:   Allergen Reactions    Tylenol [acetaminophen] Anaphylaxis and Shortness Of Breath     Patient Active Problem List    Diagnosis Date Noted    HTN (hypertension) 01/04/2023    Atrial fibrillation 01/04/2023    Diabetes 01/04/2023       Scheduled Meds:    amiodarone  200 mg Oral BID    atorvastatin  40 mg Oral QHS    digoxin  0.125 mg Oral Daily    famotidine  20 mg Oral BID    furosemide  40 mg Oral Daily    levothyroxine  25 mcg Oral Before breakfast    metFORMIN  1,000 mg Oral Daily with breakfast    miconazole NITRATE 2 %   Topical (Top) BID    predniSONE  5 mg Oral Daily    warfarin  4 mg Oral Every Tues, Thurs, Sat, Sun     Continuous Infusions:   PRN Meds: calcium carbonate, dextrose 50%, dextrose 50%, glucagon (human recombinant), glucose, glucose, insulin aspart U-100, melatonin, oxyCODONE, prochlorperazine, senna-docusate 8.6-50 mg    OBJECTIVE:     Vital Signs (Last 24H)  Temp:  [97.3 °F (36.3 °C)-98.1 °F (36.7 °C)]   Pulse:  [62-66]   Resp:  [17-19]   BP: ()/(40-56)   SpO2:  [95 %-97 %]     Laboratory:  CBC:   Recent Labs   Lab 02/11/23  0506   WBC 10.74   RBC 2.76*   HGB 7.9*   HCT 26.1*      MCV 94.6   MCH 28.6   MCHC 30.3*     BMP:   Recent Labs   Lab 02/11/23  0506   *   *   K 4.3   CL 98   CO2 34*   BUN 12   CREATININE 0.77   CALCIUM 8.5     .    ASSESSMENT/PLAN:     Estimated Creatinine Clearance: 56.7 mL/min (based on SCr of 0.77 mg/dL).Medications reviewed, no dose adjustments needed. Last INR slightly subtherapeutic, will repeat in am and adjust dosing as indicated.TSH 6.079 on 01-, dose adjusted at that time.  GI prophylaxis with famotidine, due to prednisone  and warfarin tx.  Weekly swing bed medication regiment review complete.  Will continue to monitor.  Mike Nunez, Pharm. D.  Director of Pharmacy  [facility]  [phone]  [email or web address]

## 2023-02-14 LAB
GLUCOSE SERPL-MCNC: 124 MG/DL (ref 70–105)
GLUCOSE SERPL-MCNC: 178 MG/DL (ref 70–105)
GLUCOSE SERPL-MCNC: 200 MG/DL (ref 70–105)
GLUCOSE SERPL-MCNC: 200 MG/DL (ref 70–105)
INR BLD: 2.18
PROTHROMBIN TIME: 24.3 SECONDS (ref 11.7–14.7)

## 2023-02-14 PROCEDURE — 99900035 HC TECH TIME PER 15 MIN (STAT)

## 2023-02-14 PROCEDURE — 27000221 HC OXYGEN, UP TO 24 HOURS

## 2023-02-14 PROCEDURE — 82962 GLUCOSE BLOOD TEST: CPT

## 2023-02-14 PROCEDURE — 63600175 PHARM REV CODE 636 W HCPCS

## 2023-02-14 PROCEDURE — 85610 PROTHROMBIN TIME: CPT | Performed by: HOSPITALIST

## 2023-02-14 PROCEDURE — 97530 THERAPEUTIC ACTIVITIES: CPT | Mod: CQ

## 2023-02-14 PROCEDURE — 11000004 HC SNF PRIVATE

## 2023-02-14 PROCEDURE — 97110 THERAPEUTIC EXERCISES: CPT

## 2023-02-14 PROCEDURE — 25000003 PHARM REV CODE 250: Performed by: HOSPITALIST

## 2023-02-14 PROCEDURE — 97530 THERAPEUTIC ACTIVITIES: CPT

## 2023-02-14 PROCEDURE — 25000003 PHARM REV CODE 250

## 2023-02-14 RX ORDER — ONDANSETRON 4 MG/1
4 TABLET, ORALLY DISINTEGRATING ORAL EVERY 8 HOURS PRN
Status: DISCONTINUED | OUTPATIENT
Start: 2023-02-14 | End: 2023-03-10 | Stop reason: HOSPADM

## 2023-02-14 RX ADMIN — MICONAZOLE NITRATE 2 % TOPICAL POWDER: at 08:02

## 2023-02-14 RX ADMIN — OXYCODONE HYDROCHLORIDE 10 MG: 10 TABLET ORAL at 05:02

## 2023-02-14 RX ADMIN — PREDNISONE 5 MG: 5 TABLET ORAL at 08:02

## 2023-02-14 RX ADMIN — ONDANSETRON 4 MG: 4 TABLET, ORALLY DISINTEGRATING ORAL at 11:02

## 2023-02-14 RX ADMIN — AMIODARONE HYDROCHLORIDE 200 MG: 200 TABLET ORAL at 08:02

## 2023-02-14 RX ADMIN — ROPINIROLE 0.25 MG: 0.25 TABLET, FILM COATED ORAL at 08:02

## 2023-02-14 RX ADMIN — ZINC OXIDE TOPICAL OINT.: at 08:02

## 2023-02-14 RX ADMIN — FUROSEMIDE 40 MG: 40 TABLET ORAL at 08:02

## 2023-02-14 RX ADMIN — FAMOTIDINE 20 MG: 20 TABLET, FILM COATED ORAL at 08:02

## 2023-02-14 RX ADMIN — WARFARIN SODIUM 4 MG: 1 TABLET ORAL at 04:02

## 2023-02-14 RX ADMIN — LEVOTHYROXINE SODIUM 25 MCG: 0.03 TABLET ORAL at 05:02

## 2023-02-14 RX ADMIN — OXYCODONE HYDROCHLORIDE 10 MG: 10 TABLET ORAL at 01:02

## 2023-02-14 RX ADMIN — METFORMIN HYDROCHLORIDE 1000 MG: 500 TABLET, FILM COATED ORAL at 08:02

## 2023-02-14 RX ADMIN — DIGOXIN 0.12 MG: 125 TABLET ORAL at 08:02

## 2023-02-14 RX ADMIN — ATORVASTATIN CALCIUM 40 MG: 40 TABLET, FILM COATED ORAL at 08:02

## 2023-02-14 RX ADMIN — ONDANSETRON 4 MG: 4 TABLET, ORALLY DISINTEGRATING ORAL at 01:02

## 2023-02-14 RX ADMIN — SENNOSIDES AND DOCUSATE SODIUM 1 TABLET: 8.6; 5 TABLET ORAL at 08:02

## 2023-02-14 NOTE — PLAN OF CARE
[facility] - Medical Surgical Unit - Skilled Nursing Facility  Patient: Karina Rojas     Interdisciplinary Team Meeting     Today's Date: 2/14/2023     Estimated D/C Date: TBD       Primary Physician:  Thalia Bowie NP    Pharmacy: [facility] Unit Director: Sowmya Valdez   Care Management: Andres Middleton Physical/Occupational Therapy: So Alford/Charlotte Lizarraga   Speech Therapy: Yanni Richard Activity Therapy: Angelica Araujo    Dietician: Elizabeth LEVINE  Pharmacist: Mike Nunez  Respiratory therapist: Tere Adame     Nurse  New Symptoms/Problems: noen  Last BM: 2/13 Urine: continent Diarrhea: No   Constipated: No Bowel: continent Davidson: no   Isolation: No     O2: 2L NC     Nutrition: diabetic diet  Speech/Swallowing: not following  Aspiration Precautions: No  Cognition: a&Ox4    Physical Therapy  Gait: ambulating 6ft with contact guard assist ELOS: TBD   Transfers: min assist 1 Range of Motion/Restrictions: WBAT     Occupational Therapy   Eating/Grooming: independent   Toileting: independent Bathing: independent   Dressing (Upper Body): independent Dressing (Lower Body): min assist       Tx Plan/Recommendations reviewed with family and patient on (date) 2/14 by phone.  Additional family Conference/Training: n/a  D/C Plan/Recommendations: home with family and home health    Pharmacy  Medication Changes (see MD orders in chart): No  MD/NP: Jorge Buckley Labs Reviewed: 2/14 New Lab Orders: none     MD/NP Signature: Time:

## 2023-02-14 NOTE — PT/OT/SLP PROGRESS
"Physical Therapy Treatment    Patient Name:  Karina Rojas   MRN:  [id number]    Recommendations:     Discharge Recommendations: home with home health  Discharge Equipment Recommendations: none  Barriers to discharge: Inaccessible home and Decreased caregiver support    Assessment:     Karina Roajs is a 82 y.o. female admitted with a medical diagnosis of Status post hip surgery.  She presents with the following impairments/functional limitations: weakness, pain, impaired endurance, gait instability, impaired balance, decreased lower extremity function, impaired cardiopulmonary response to activity, orthopedic precautions. Patient reported that she felt nauseas after transferring from chair to bed. Patient gagged and was given emesis basin, but she did not vomit. Patient declined any other treatment at this time.     Rehab Prognosis: Good; patient would benefit from acute skilled PT services to address these deficits and reach maximum level of function.    Recent Surgery: * No surgery found *      Plan:     During this hospitalization, patient to be seen BID to address the identified rehab impairments via gait training, therapeutic activities, therapeutic exercises and progress toward the following goals:    Plan of Care Expires:  03/24/23    Subjective     Chief Complaint: Patient seemed agitated that she had to sit up in the chair for so long, and reported that she was hurting all over.    Patient/Family Comments/goals: Pt plans to dc home upon dc from hosp.  Pain/Comfort:  Pain Rating 1: 8/10  Location - Side 1: Bilateral  Location - Orientation 1: generalized  Location 1:  ("All over")  Pain Addressed 1: Reposition      Objective:     Communicated with nursing prior to session. Patient found up in chair with oxygen, peripheral IV upon PTA entry to room.     General Precautions: Standard, fall  Orthopedic Precautions: RLE weight bearing as tolerated, RLE posterior precautions (No AROM right hip abd.)  Braces: "    Respiratory Status: Nasal cannula, flow 2 L/min     Functional Mobility:  Bed Mobility:     Sit to Supine: moderate assistance and for BLE management.  Transfers:     Sit to Stand:  moderate assistance and of 1 persons with rolling walker  Bed to Chair: minimum assistance, moderate assistance, and of 1 persons with  rolling walker  using  Stand Pivot      AM-PAC 6 CLICK MOBILITY  Turning over in bed (including adjusting bedclothes, sheets and blankets)?: 4  Sitting down on and standing up from a chair with arms (e.g., wheelchair, bedside commode, etc.): 3  Moving from lying on back to sitting on the side of the bed?: 3  Moving to and from a bed to a chair (including a wheelchair)?: 3  Need to walk in hospital room?: 3  Climbing 3-5 steps with a railing?: 1  Basic Mobility Total Score: 17       Treatment & Education:  Treatment consisted of transferring from chair to bed and bed mobility. Patient required 2x attempts to fully stand. Patient declined any other treatment at this time.     Patient left HOB elevated with all lines intact and call button in reach.    GOALS:   Multidisciplinary Problems       Physical Therapy Goals          Problem: Physical Therapy    Goal Priority Disciplines Outcome Goal Variances Interventions   Physical Therapy Goal     PT, PT/OT Ongoing, Progressing     Description: STG - 3 weeks  1. Pt min A x 1 with bed mob.  2. Pt min A x 1  with transfers using RW.  3. Pt will amb 25 ft with min A x 1 using RW.  4. Pt will have 3/5 RLE strength.  5. Pt will recall 3/3 SHYANNE precautions.    LTG - 6 weeks  1. Pt SBA with bed mob.  2. Pt SBA with transfers using RW.  3. Pt will amb 50 ft with SBA using RW.  4. Pt will have 4-/5 RLE strength.                       Time Tracking:     PT Received On: 02/14/23  PT Start Time: 1245     PT Stop Time: 1255  PT Total Time (min): 10 min     Billable Minutes: Therapeutic Activity 10    Treatment Type: Treatment  PT/PTA: PTA     PTA Visit Number: 1      02/14/2023

## 2023-02-14 NOTE — NURSING
Pt's son Vladimir asked if we could help get pt in wheelchair and hooked up to portable oxygen tank to go outside to meet her great grandbaby. Pt hooked up to oxygen tank set at 3L NC pt in wheelchair and rolled out with son vladimir and sanju Tinsley. Will wait for pt to return.

## 2023-02-14 NOTE — NURSING
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 BP's taken are as follows:     Right Arm: 132/42 Map 68  Left Arm: 81/42 MAP 61

## 2023-02-14 NOTE — PLAN OF CARE
Problem: Adult Inpatient Plan of Care  Goal: Plan of Care Review  Outcome: Ongoing, Progressing  Goal: Patient-Specific Goal (Individualized)  Outcome: Ongoing, Progressing  Goal: Absence of Hospital-Acquired Illness or Injury  Outcome: Ongoing, Progressing  Goal: Optimal Comfort and Wellbeing  Outcome: Ongoing, Progressing     Problem: Diabetes Comorbidity  Goal: Blood Glucose Level Within Targeted Range  Outcome: Ongoing, Progressing     Problem: Fall Injury Risk  Goal: Absence of Fall and Fall-Related Injury  Outcome: Ongoing, Progressing     Problem: Impaired Wound Healing  Goal: Optimal Wound Healing  Outcome: Ongoing, Progressing     Problem: Skin Injury Risk Increased  Goal: Skin Health and Integrity  Outcome: Ongoing, Progressing     Problem: Gas Exchange Impaired  Goal: Optimal Gas Exchange  Outcome: Ongoing, Progressing

## 2023-02-14 NOTE — PT/OT/SLP PROGRESS
"Physical Therapy Treatment    Patient Name:  Karina Rojas   MRN:  [id number]    Recommendations:     Discharge Recommendations: home with home health  Discharge Equipment Recommendations: none  Barriers to discharge: Inaccessible home and Decreased caregiver support    Assessment:     Karina Rojas is a 82 y.o. female admitted with a medical diagnosis of Status post hip surgery.  She presents with the following impairments/functional limitations: pain, weakness, impaired endurance, gait instability, impaired balance, decreased lower extremity function, impaired cardiopulmonary response to activity, orthopedic precautions . KRISTOPHER Ann PTA, informed PT that pt having cont'd left calf pain and that nsg had messaged Dr. Buckley. PT spoke with Jeny Carter RN, and she informed PT that Dr. Buckley stated that the pt's "left calf pain will not hinder her from participating with therapy. She needs to get up."    Rehab Prognosis: Good; patient would benefit from acute skilled PT services to address these deficits and reach maximum level of function.    Recent Surgery: * No surgery found *      Plan:     During this hospitalization, patient to be seen BID to address the identified rehab impairments via gait training, therapeutic activities, therapeutic exercises and progress toward the following goals:    Plan of Care Expires:  03/24/23    Subjective     Chief Complaint: Pt reported no left calf pain to PT, but did state that she began having difficulty with AROM left DF whereas she could perform this motion with PT the past 2 visits.  Patient/Family Comments/goals: Pt plans to dc home upon dc from hosp.  Pain/Comfort:  Pain Rating 1: 3/10  Location - Side 1: Right  Location - Orientation 1: lower  Location 1: hip  Pain Addressed 1: Pre-medicate for activity  Pain Rating 2: 0/10  Location - Side 2: Left  Location - Orientation 2: lower  Location 2: calf      Objective:     Communicated with PTA, OT and nurse prior to " session.Pt was cleared by Dr. Buckley per Jeny Carter to fully participate with all therapy.  Patient found HOB elevated with oxygen, peripheral IV upon PT entry to room.     General Precautions: Standard, fall  Orthopedic Precautions: RLE weight bearing as tolerated, RLE posterior precautions (No AROM right hip abd.)  Braces:    Respiratory Status: Nasal cannula, flow 2 L/min     Functional Mobility:  Bed Mobility:     Rolling Right: stand by assistance  Scooting: stand by assistance  Supine to Sit: minimum assistance and with RLE  Transfers:     Sit to Stand:  CGA/Min Ax1 with rolling walker  Bed to Chair: minimum assistance with  rolling walker  using  Stand Pivot  Gait: Pt amb'd with RW/O2 fwd x 3ft/ bwd x 3 ft with CGA/Min Ax1 for total of 6 ft before having to sit due to nausea.      AM-PAC 6 CLICK MOBILITY  Turning over in bed (including adjusting bedclothes, sheets and blankets)?: 4  Sitting down on and standing up from a chair with arms (e.g., wheelchair, bedside commode, etc.): 3  Moving from lying on back to sitting on the side of the bed?: 3  Moving to and from a bed to a chair (including a wheelchair)?: 3  Need to walk in hospital room?: 3  Climbing 3-5 steps with a railing?: 1  Basic Mobility Total Score: 17       Treatment & Education:  Bed Ex's RLE: AP with assist x 15 reps, HS x 15, hip add x 10 (PT passively moved RLE into hip abd), hip ER/IR to neutral x 15, bridging x 10, SAQ  x 15, hip ER with knees flexed x 10. Reviewd with pt SHYANNE px's (pt remembered 2/3) and leaning left during STS transfer. Pt then amb 6 ft until nauseated as, stated above, and had to sit on EOB. Pt then performed SPT bed to bedside chair with min A and left sitting up with instructions to sit for an hour. Nsg informed pt sitting up in chair. Pt instructed to call nsg when she is ready to get back in bed.    Patient left up in chair with all lines intact and RN notified..    GOALS:   Multidisciplinary Problems       Physical  Therapy Goals          Problem: Physical Therapy    Goal Priority Disciplines Outcome Goal Variances Interventions   Physical Therapy Goal     PT, PT/OT Ongoing, Progressing     Description: STG - 3 weeks  1. Pt min A x 1 with bed mob.  2. Pt min A x 1  with transfers using RW.  3. Pt will amb 25 ft with min A x 1 using RW.  4. Pt will have 3/5 RLE strength.  5. Pt will recall 3/3 SHYANNE precautions.    LTG - 6 weeks  1. Pt SBA with bed mob.  2. Pt SBA with transfers using RW.  3. Pt will amb 50 ft with SBA using RW.  4. Pt will have 4-/5 RLE strength.                       Time Tracking:     PT Received On: 02/14/23  PT Start Time: 0957     PT Stop Time: 1015  PT Total Time (min): 18 min     Billable Minutes: Gait Training 2, Therapeutic Activity 5, Therapeutic Exercise 11, and Total Time 18 min    Treatment Type: Treatment  PT/PTA: PT     PTA Visit Number: 0     02/14/2023

## 2023-02-14 NOTE — NURSING
Nurses Note -- 4 Eyes      2/13/2023   7:55 PM      Skin assessed during: Q Shift Change      [] No Pressure Injuries Present    []Prevention Measures Documented      [] Yes- Altered Skin Integrity Present or Discovered   [] LDA Added if Not in Epic (Describe Wound)   [x] New Altered Skin Integrity was Present on Admit and Documented in LDA   [] Wound Image Taken    Wound Care Consulted? No    Attending Nurse:  Teofilo Lizarraga LPN     Second RN/Staff Member:  Lisa Davidson RN

## 2023-02-14 NOTE — PROGRESS NOTES
"[facility] - [facility] Surgical Unit  Adult Nutrition  First Assessment Note         Reason for Assessment  Reason For Assessment: consult (Assess/educate regarding nutritional needs)  Nutrition Risk Screen: no indicators present    RD consult received and appreciated for initial assessment.    Recommend continue current Diabetic diet order as medically appropriate and tolerated. Recommend adding Glucerna oral nutrition supplement TID to promote adequate oral intake. Encourage good PO intakes.    Per MD note:  "HPI: Patient is a 82-year-old ill-appearing  female who is admitted to [facility] for skilled nursing services status post right intratrochanteric for mole nail removal and subsequent right total hip replacement with ORIF.  Patient is here for physical and occupational therapy services.  Patient's surgical procedure took place [facility] in [place] on February 9, 2023 by Dr. Teofilo Lewis."     Patient currently receiving Diabetic Diet with 0-50% PO intake documented. PO intake inadequate to meet nutritional needs. Recommend continue current diet order as medically appropriate and tolerated. Recommend adding Glucerna oral nutrition supplement TID to promote adequate oral intake. Glucerna provides 220kcal, 10g protein each. Encourage good PO intakes.    Patient CBW and BMI are within IBW range and WNL. Will continue to monitor weight trends.     Last BM 2/13 per flowsheets - pt receiving senna-docusate PRN per med list. Will continue to monitor PO intakes, weight trend, meds, labs, updates in patient condition. RD following.    Malnutrition  Is Patient Malnourished: No    Nutrition Diagnosis  Altered Nutrition Related Lab Values   related to Diabetes Mellitus as evidenced by pt with elevated GLU    Nutrition Diagnosis Status: Chronic/ continues    Nutrition Risk  Level of Risk/Frequency of Follow-up: moderate - high   Chewing or Swallowing " Difficulty?: No Chewing or swallowing difficulty    Estimated/Assessed Needs    Temp: 97.6 °F (36.4 °C)Oral  Weight Used For Calorie Calculations: 66.2 kg (145 lb 15.1 oz)   Energy Need Method: Kcal/kg (25-30kcal/kg) Energy Calorie Requirements (kcal): 1655-1986kcal  Weight Used For Protein Calculations: 66.2 kg (145 lb 15.1 oz)  Protein Requirements: 66-80g pro (1.0-1.2g pro/kg)       RDA Method (mL): 1655     Nutrition Prescription / Recommendations  Recommendation/Intervention: Recommend continue current Diabetic diet order as medically appropriate and tolerated. Recommend adding Glucerna oral nutrition supplement TID to promote adequate oral intake. Encourage good PO intakes.  Goals: PO intakes improve, weight maintenance  Nutrition Goal Status: new  Current Diet Order: Diabetic Diet  Nutrition Order Comments: Appropriate  Recommended Diet: Consistent Carbohydrate 1800 (60g Carbs)  Recommended Oral Supplement: Glucerna Shake [220 kcals, 10g Protein, 26g Carbs(4g Fiber, 7g Sugar), 9g Fat] three times daily  Is Nutrition Support Recommended: No  Is Education Recommended: No    Monitor and Evaluation  % current Intake: Other: PO intake 0-50%  % intake to meet estimated needs: 75 - 100 %  Food and Nutrient Intake: food and beverage intake, energy intake  Food and Nutrient Adminstration: diet order  Anthropometric Measurements: weight, weight change, body mass index  Biochemical Data, Medical Tests and Procedures: electrolyte and renal panel, gastrointestinal profile, glucose/endocrine profile, inflammatory profile, lipid profile  Nutrition-Focused Physical Findings: overall appearance, extremities, muscles and bones, head and eyes, skin     Current Medical Diagnosis and Past Medical History  Diagnosis: other (see comments) (s/p hip surgery)  Past Medical History:   Diagnosis Date    Carotid artery stenosis     Chronic respiratory failure with hypoxia     COPD (chronic obstructive pulmonary disease)     Diabetes  "mellitus     DVT (deep venous thrombosis)     Heart failure     High cholesterol     Hypertension     Hypothyroidism     Paroxysmal atrial fibrillation      Nutrition/Diet History  Spiritual, Cultural Beliefs, Yazdanism Practices, Values that Affect Care: no  Food Allergies: NKFA  Factors Affecting Nutritional Intake: nausea/vomiting    Lab/Procedures/Meds  No results for input(s): NA, K, BUN, CREATININE, GLU, CALCIUM, ALBUMIN, CL, ALT, AST, PHOS in the last 72 hours.  Last A1c:   Lab Results   Component Value Date    HGBA1C 6.7 (H) 02/06/2023     Lab Results   Component Value Date    RBC 2.76 (L) 02/11/2023    HGB 7.9 (L) 02/11/2023    HCT 26.1 (L) 02/11/2023    MCV 94.6 02/11/2023    MCH 28.6 02/11/2023    MCHC 30.3 (L) 02/11/2023     Pertinent Labs Reviewed: reviewed  Na 133 (L) - replete to WNL as needed; CO2 34 (H) - pt with PMH of COPD; Anion gap 5 (L),  (H) - pt with DM  Pertinent Medications Reviewed: reviewed  Amiodarone, atorvastatin, digoxin, famotidine, furosemide, levothyroxine, ropinirole, coumadin, oxycodone PRN, senna-docusate PRN    Anthropometrics  Temp: 97.6 °F (36.4 °C)  Height Method: Estimated  Height: 5' 7" (170.2 cm)  Height (inches): 67 in  Weight Method: Standard Scale  Weight: 66.2 kg (145 lb 15.1 oz)  Weight (lb): 145.95 lb  Ideal Body Weight (IBW), Female: 135 lb  % Ideal Body Weight, Female (lb): 108.11 %  BMI (Calculated): 22.9     Nutrition by Nursing  Diet/Nutrition Received: consistent carb/diabetic diet  Intake (%): 50%  Diet/Feeding Assistance: none  Diet/Feeding Tolerance: nausea, no appetite  Last Bowel Movement: 02/13/23    Nutrition Follow-Up  RD Follow-up?: Yes  "

## 2023-02-14 NOTE — HOSPITAL COURSE
2/13 2000 Called by RICARDO Galdamez LPN that Ms Rojas is having N/V and  BP 81/40.  Ms Rojas reports she has had intermittent NV since surg last week.  She is able to take sips of PO fluids but continues to have nausea.  Ms Rojas states her BP is always low 100s/40-50s.  She denies pain.  She denies diarrhea.  Will treat with 250 ml bolus, Zofran and continue to monitor.     2/15 Nausea seems better today, not vomiting but just nauseated.  Did eat lunch. Pain seems controlled.      2/20 Overall seems better, however, she has now developed R foot drop with decreased dorsiflexion.  Otherwise nausea seems better    2/27 Doing good today, still with foot drop, but working with therapy with brace. Arterial US done showing severe PAD which is known.     3/2 Went to surgeon for follow ended up with wound vac on incision site.  Otherwise doing well.     3/9 Had follow up yesterday for wound vac and surgery eval, continue wound vac, otherwise improving with therapy     3/10 better, doing well.  DC today, has follow up.  Has reached max benefit from sWB.

## 2023-02-14 NOTE — CONSULTS
RD consult addressed - please see 2/14 1:15PM file time note. Thank you!    Arianne Funes MS-MPH, RDN, LD

## 2023-02-14 NOTE — PLAN OF CARE
Problem: Adult Inpatient Plan of Care  Goal: Plan of Care Review  Outcome: Ongoing, Progressing  Goal: Patient-Specific Goal (Individualized)  Outcome: Ongoing, Progressing  Goal: Absence of Hospital-Acquired Illness or Injury  Outcome: Ongoing, Progressing  Goal: Optimal Comfort and Wellbeing  Outcome: Ongoing, Progressing  Goal: Readiness for Transition of Care  Outcome: Ongoing, Progressing     Problem: Diabetes Comorbidity  Goal: Blood Glucose Level Within Targeted Range  Outcome: Ongoing, Progressing     Problem: Fall Injury Risk  Goal: Absence of Fall and Fall-Related Injury  Outcome: Ongoing, Progressing     Problem: Pain Acute  Goal: Acceptable Pain Control and Functional Ability  Outcome: Ongoing, Progressing     Problem: Impaired Wound Healing  Goal: Optimal Wound Healing  Outcome: Ongoing, Progressing     Problem: Skin Injury Risk Increased  Goal: Skin Health and Integrity  Outcome: Ongoing, Progressing

## 2023-02-14 NOTE — PT/OT/SLP PROGRESS
"Occupational Therapy   Treatment    Name: Karina Rojas  MRN: [id number]  Admitting Diagnosis:  Status post hip surgery       Recommendations:     Discharge Recommendations: home with home health, home health PT, home health OT  Discharge Equipment Recommendations:  hip kit  Barriers to discharge:  None    Assessment:     Karina Rojas is a 82 y.o. female with a medical diagnosis of Status post hip surgery.  She presents with "I had a terrible night.  And I just still am not able to keep anything down much."  Performance deficits affecting function are weakness, impaired endurance, impaired self care skills, impaired functional mobility, gait instability, impaired balance, decreased lower extremity function, pain, impaired skin, impaired cardiopulmonary response to activity, orthopedic precautions.     Rehab Prognosis:  Fair; patient would benefit from acute skilled OT services to address these deficits and reach maximum level of function.       Plan:     Patient to be seen 5 x/week to address the above listed problems via self-care/home management, therapeutic activities, therapeutic exercises, wheelchair management/training  Plan of Care Expires: 03/10/23  Plan of Care Reviewed with: patient, daughter    Subjective     Pain/Comfort:       Objective:     Communicated with: pt, PT, LPTA prior to session.  Patient found HOB elevated with SCD upon OT entry to room.    General Precautions: Standard, diabetic, fall    Orthopedic Precautions:RLE weight bearing as tolerated  Braces: N/A  Respiratory Status: Nasal cannula, flow 3 L/min     Occupational Performance:     Bed Mobility:    Pt needing min a with overall    Functional Mobility/Transfers:  See PT    Activities of Daily Living:  Lower Body Dressing: minimum assistance discussed LB dressing needs with pt and need to progress toward d/c level; pt states, "Oh, I want to go home as soon as I can, I promise."      [facility] 6 Click ADL:      Treatment & Education:  OT " educated pt again on HOME EXERCISE PROGRAM with red tubing.  Pt did not wish to get out of bed.  She reports not feeling well again.  She states it will be ok if OT comes by to see how her dressing/bathing skills are progressing.  Pt reports she has a reacher, sock aid, and long handled sponge at home and knows how to use them.    Patient left HOB elevated with call button in reach and red tubing for pt to complete HOME EXERCISE PROGRAM on her own as pt demonstrated 100% correct how to complete with tubing exercises.    GOALS:   Multidisciplinary Problems       Occupational Therapy Goals          Problem: Occupational Therapy    Goal Priority Disciplines Outcome Interventions   Occupational Therapy Goal     OT, PT/OT Ongoing, Progressing    Description: STG: within 2 weeks  Pt will perform grooming with setup at sinkside  Pt will bathe with min a at sinkside  Pt will perform UE dressing with svn at sinkside  Pt will perform LE dressing with min a sinkside  Pt will sit EOB x 30 min with min assistance  Pt will transfer bed/chair/bsc with SBA and RW  Pt will perform standing task x 3 min with CGA/SBA assistance  Pt will tolerate 35 minutes of tx without fatigue      LT.Restore to max I with self care and mobility.                          Time Tracking:     OT Date of Treatment: 23  OT Start Time: 1600  OT Stop Time: 1610  OT Total Time (min): 10 min    Billable Minutes:Therapeutic Activity 10    OT/HELENA: OT          2023

## 2023-02-15 PROBLEM — R11.0 NAUSEA: Status: ACTIVE | Noted: 2023-02-15

## 2023-02-15 LAB
DIGOXIN SERPL-MCNC: 1.6 NG/ML (ref 0.8–2)
GLUCOSE SERPL-MCNC: 115 MG/DL (ref 70–105)
GLUCOSE SERPL-MCNC: 173 MG/DL (ref 70–105)
GLUCOSE SERPL-MCNC: 197 MG/DL (ref 70–105)
GLUCOSE SERPL-MCNC: 204 MG/DL (ref 70–105)

## 2023-02-15 PROCEDURE — 25000003 PHARM REV CODE 250

## 2023-02-15 PROCEDURE — 97530 THERAPEUTIC ACTIVITIES: CPT | Mod: CQ

## 2023-02-15 PROCEDURE — 97110 THERAPEUTIC EXERCISES: CPT

## 2023-02-15 PROCEDURE — 27000221 HC OXYGEN, UP TO 24 HOURS

## 2023-02-15 PROCEDURE — 25000003 PHARM REV CODE 250: Performed by: HOSPITALIST

## 2023-02-15 PROCEDURE — 11000004 HC SNF PRIVATE

## 2023-02-15 PROCEDURE — 80162 ASSAY OF DIGOXIN TOTAL: CPT | Performed by: HOSPITALIST

## 2023-02-15 PROCEDURE — 97110 THERAPEUTIC EXERCISES: CPT | Mod: CQ

## 2023-02-15 PROCEDURE — 82962 GLUCOSE BLOOD TEST: CPT

## 2023-02-15 PROCEDURE — 63600175 PHARM REV CODE 636 W HCPCS

## 2023-02-15 PROCEDURE — 99308 SBSQ NF CARE LOW MDM 20: CPT | Mod: ,,, | Performed by: HOSPITALIST

## 2023-02-15 PROCEDURE — 99308 PR NURSING FAC CARE, SUBSEQ, MINOR COMPLIC: ICD-10-PCS | Mod: ,,, | Performed by: HOSPITALIST

## 2023-02-15 PROCEDURE — 97530 THERAPEUTIC ACTIVITIES: CPT

## 2023-02-15 RX ADMIN — MICONAZOLE NITRATE 2 % TOPICAL POWDER: at 08:02

## 2023-02-15 RX ADMIN — INSULIN ASPART 2 UNITS: 100 INJECTION, SOLUTION INTRAVENOUS; SUBCUTANEOUS at 11:02

## 2023-02-15 RX ADMIN — LEVOTHYROXINE SODIUM 25 MCG: 0.03 TABLET ORAL at 06:02

## 2023-02-15 RX ADMIN — OXYCODONE HYDROCHLORIDE 10 MG: 10 TABLET ORAL at 03:02

## 2023-02-15 RX ADMIN — AMIODARONE HYDROCHLORIDE 200 MG: 200 TABLET ORAL at 08:02

## 2023-02-15 RX ADMIN — DIGOXIN 0.12 MG: 125 TABLET ORAL at 08:02

## 2023-02-15 RX ADMIN — MELATONIN 6 MG: at 08:02

## 2023-02-15 RX ADMIN — ROPINIROLE 0.25 MG: 0.25 TABLET, FILM COATED ORAL at 08:02

## 2023-02-15 RX ADMIN — ZINC OXIDE TOPICAL OINT.: at 08:02

## 2023-02-15 RX ADMIN — SENNOSIDES AND DOCUSATE SODIUM 1 TABLET: 8.6; 5 TABLET ORAL at 03:02

## 2023-02-15 RX ADMIN — FUROSEMIDE 40 MG: 40 TABLET ORAL at 08:02

## 2023-02-15 RX ADMIN — ATORVASTATIN CALCIUM 40 MG: 40 TABLET, FILM COATED ORAL at 08:02

## 2023-02-15 RX ADMIN — FAMOTIDINE 20 MG: 20 TABLET, FILM COATED ORAL at 08:02

## 2023-02-15 RX ADMIN — PREDNISONE 5 MG: 5 TABLET ORAL at 08:02

## 2023-02-15 RX ADMIN — OXYCODONE HYDROCHLORIDE 10 MG: 10 TABLET ORAL at 10:02

## 2023-02-15 RX ADMIN — OXYCODONE HYDROCHLORIDE 10 MG: 10 TABLET ORAL at 01:02

## 2023-02-15 NOTE — PLAN OF CARE
Problem: Adult Inpatient Plan of Care  Goal: Plan of Care Review  Outcome: Ongoing, Progressing  Goal: Patient-Specific Goal (Individualized)  Outcome: Ongoing, Progressing  Goal: Absence of Hospital-Acquired Illness or Injury  Outcome: Ongoing, Progressing  Goal: Optimal Comfort and Wellbeing  Outcome: Ongoing, Progressing     Problem: Diabetes Comorbidity  Goal: Blood Glucose Level Within Targeted Range  Outcome: Ongoing, Progressing     Problem: Fall Injury Risk  Goal: Absence of Fall and Fall-Related Injury  Outcome: Ongoing, Progressing     Problem: Pain Acute  Goal: Acceptable Pain Control and Functional Ability  Outcome: Ongoing, Progressing     Problem: Skin Injury Risk Increased  Goal: Skin Health and Integrity  Outcome: Ongoing, Progressing

## 2023-02-15 NOTE — PT/OT/SLP PROGRESS
Physical Therapy Treatment    Patient Name:  Karina Rojas   MRN:  [id number]    Recommendations:     Discharge Recommendations: home with home health  Discharge Equipment Recommendations: none  Barriers to discharge: Inaccessible home and Decreased caregiver support    Assessment:     Karina Rojas is a 82 y.o. female admitted with a medical diagnosis of Status post hip surgery.  She presents with the following impairments/functional limitations: weakness, impaired endurance, impaired balance, impaired functional mobility, gait instability, pain, decreased lower extremity function, impaired cardiopulmonary response to activity, orthopedic precautions. Patient stated that she felt dizzy after moving from supine with HOB elevated to sitting EOB, but she stated that she felt better after sitting for a short time. Patient able to perform sit to stand transfer with mod A, but she stated that she did not think she could perform bed to chair transfer this treatment. Patient reported that she felt nauseas after one sit to stand transfer and she was given emesis basin, but she did not vomit. Patient was able to recall 2/3 hip posterior hip precautions, but she forgot precaution regarding crossing legs. She also was not able to verbally recall that she is not able to actively perform R hip ABD. After completion of treatment patient had no reports of adverse effects.     Rehab Prognosis: Good; patient would benefit from acute skilled PT services to address these deficits and reach maximum level of function.    Recent Surgery: * No surgery found *      Plan:     During this hospitalization, patient to be seen BID to address the identified rehab impairments via gait training, therapeutic activities, therapeutic exercises and progress toward the following goals:    Plan of Care Expires:  03/24/23    Subjective     Chief Complaint: Patient reported that her bilateral feet were hurting. Patient also stated that she was tired  this date.   Patient/Family Comments/goals: Pt plans to dc home upon dc from hosp.  Pain/Comfort:  Pain Rating 1:  (Pt stated that she had pain in her bilateral feet, but she did not give pain rating.)  Location - Side 1: Bilateral  Location - Orientation 1: generalized  Location 1: foot  Pain Addressed 1: Reposition      Objective:     Communicated with nursing prior to session. Patient found HOB elevated with oxygen upon PTA entry to room.     General Precautions: Standard, fall  Orthopedic Precautions: RLE weight bearing as tolerated, RLE posterior precautions (No AROM right hip abd.)  Braces:    Respiratory Status: Nasal cannula, flow 2 L/min     Functional Mobility:  Bed Mobility:     Scooting: supervision  Supine to Sit: minimum assistance and for RLE  Transfers:     Sit to Stand:  moderate assistance and of 1 persons with rolling walker      AM-PAC 6 CLICK MOBILITY  Turning over in bed (including adjusting bedclothes, sheets and blankets)?: 4  Sitting down on and standing up from a chair with arms (e.g., wheelchair, bedside commode, etc.): 3  Moving from lying on back to sitting on the side of the bed?: 3  Moving to and from a bed to a chair (including a wheelchair)?: 2  Need to walk in hospital room?: 2  Climbing 3-5 steps with a railing?: 1  Basic Mobility Total Score: 15       Treatment & Education:  Patient performed sit to stand transfer 4x with approx. 45 to 1 minute and 30 second hold each time so CNA could change bedding. Patient also performed the following therapeutic exercises: SLRs 5 x 2 with Mod A for RLE, ankle pumps 15x with max A for L foot, glute sets 10x, quad sets 10x     Patient left HOB elevated with all lines intact and call button in reach.    GOALS:   Multidisciplinary Problems       Physical Therapy Goals          Problem: Physical Therapy    Goal Priority Disciplines Outcome Goal Variances Interventions   Physical Therapy Goal     PT, PT/OT Ongoing, Progressing     Description: STG -  3 weeks  1. Pt min A x 1 with bed mob.  2. Pt min A x 1  with transfers using RW.  3. Pt will amb 25 ft with min A x 1 using RW.  4. Pt will have 3/5 RLE strength.  5. Pt will recall 3/3 SHYANNE precautions.    LTG - 6 weeks  1. Pt SBA with bed mob.  2. Pt SBA with transfers using RW.  3. Pt will amb 50 ft with SBA using RW.  4. Pt will have 4-/5 RLE strength.                       Time Tracking:     PT Received On: 02/15/23  PT Start Time: 1057     PT Stop Time: 1129  PT Total Time (min): 32 min     Billable Minutes: Therapeutic Activity 22 and Therapeutic Exercise 10    Treatment Type: Treatment  PT/PTA: PTA     PTA Visit Number: 2     02/15/2023

## 2023-02-15 NOTE — PROGRESS NOTES
[facility] Hospital Medicine  Progress Note    Patient Name: Karina Rojas  MRN: [id number]  Patient Class: IP- Swing   Admission Date: 2/10/2023  Length of Stay: 5 days  Attending Physician: Jorge Buckley DO  Primary Care Provider: Thalia Bowie NP        Subjective:     Principal Problem:Status post hip surgery    [facility] Hospital Admission Certification    I certify that skilled nursing facility services are required to be given on an inpatient basis due to the following required skilled nursing and/or skilled rehabilitation services on a continuing basis:    management & evaluation of a patient plan of care that requires skilled nursing or rehabilitation services    I estimate that the additional period of SNF inpatient care will be 1-21 days.    Plans for post SNF care are: Home Care  ______________________________________________________________________        HPI:  Patient is a 82-year-old ill-appearing  female who is admitted to [facility] for skilled nursing services status post right intratrochanteric for mole nail removal and subsequent right total hip replacement with ORIF.  Patient is here for physical and occupational therapy services.  Patient's surgical procedure took place [facility] in [place] on February 9, 2023 by Dr. Teofilo Lewis.      Overview/Hospital Course:  2/13 2000 Called by RICARDO Galdamez LPN that Ms Rojas is having N/V and  BP 81/40.  Ms Rojas reports she has had intermittent NV since surg last week.  She is able to take sips of PO fluids but continues to have nausea.  Ms Rojas states her BP is always low 100s/40-50s.  She denies pain.  She denies diarrhea.  Will treat with 250 ml bolus, Zofran and continue to monitor.     2/15 Nausea seems better today, not vomiting but just nauseated.  Did eat lunch. Pain seems controlled.        Interval History: some  nausea, better with zofran    Review of Systems   Respiratory:  Negative for shortness of breath.    Cardiovascular:  Negative for chest pain.   Gastrointestinal:  Positive for nausea. Negative for abdominal pain and vomiting.   Musculoskeletal:  Positive for arthralgias and back pain.   Psychiatric/Behavioral:  Negative for agitation and confusion.    All other systems reviewed and are negative.  Objective:     Vital Signs (Most Recent):  Temp: 98 °F (36.7 °C) (02/15/23 0800)  Pulse: 64 (02/15/23 1258)  Resp: 20 (02/15/23 0800)  BP: (!) 131/44 (02/15/23 1258)  SpO2: 95 % (02/15/23 0800)   Vital Signs (24h Range):  Temp:  [98 °F (36.7 °C)] 98 °F (36.7 °C)  Pulse:  [60-64] 64  Resp:  [15-20] 20  SpO2:  [95 %-98 %] 95 %  BP: (131-141)/(40-44) 131/44     Weight: 66.2 kg (145 lb 15.1 oz)  Body mass index is 22.86 kg/m².    Intake/Output Summary (Last 24 hours) at 2/15/2023 1304  Last data filed at 2/15/2023 0747  Gross per 24 hour   Intake 840 ml   Output --   Net 840 ml      Physical Exam  Vitals reviewed.   Constitutional:       General: She is not in acute distress.     Appearance: Normal appearance.   HENT:      Head: Normocephalic and atraumatic.   Eyes:      General: No scleral icterus.     Extraocular Movements: Extraocular movements intact.      Conjunctiva/sclera: Conjunctivae normal.      Pupils: Pupils are equal, round, and reactive to light.   Cardiovascular:      Rate and Rhythm: Normal rate and regular rhythm.      Heart sounds: No murmur heard.    No friction rub. No gallop.   Pulmonary:      Effort: Pulmonary effort is normal. No respiratory distress.      Breath sounds: Normal breath sounds. No wheezing or rales.   Abdominal:      General: Abdomen is flat. Bowel sounds are normal. There is no distension.      Palpations: Abdomen is soft.      Tenderness: There is no abdominal tenderness. There is no guarding.   Musculoskeletal:         General: No swelling.      Comments: Hip pain ok    Skin:      General: Skin is warm and dry.      Coloration: Skin is not jaundiced.      Findings: No rash.   Neurological:      General: No focal deficit present.      Mental Status: She is alert and oriented to person, place, and time.      Sensory: No sensory deficit.      Motor: Weakness present.   Psychiatric:         Mood and Affect: Mood normal.         Behavior: Behavior normal.         Thought Content: Thought content normal.       Significant Labs: All pertinent labs within the past 24 hours have been reviewed.  Recent Lab Results  (Last 5 results in the past 24 hours)        02/15/23  1125   02/15/23  0548   02/15/23  0541   02/14/23  1933   02/14/23  1630        Digoxin Lvl   1.6             POC Glucose 204     115   200   200                              Significant Imaging: I have reviewed all pertinent imaging results/findings within the past 24 hours.      Assessment/Plan:      * Status post hip surgery  Monitor pain and progress with therapy       Nausea  Better with zofran.  Monitor PO intake.       Chronic pain syndrome  Monitor while on pain meds.  Will not escalate at this time.       Diabetes  Patient's FSGs are controlled on current medication regimen.  Last A1c reviewed-   Lab Results   Component Value Date    HGBA1C 6.7 (H) 02/06/2023     Most recent fingerstick glucose reviewed- No results for input(s): POCTGLUCOSE in the last 24 hours.  Current correctional scale  Low  Maintain anti-hyperglycemic dose as follows-   Antihyperglycemics (From admission, onward)    Start     Stop Route Frequency Ordered    02/11/23 0745  metFORMIN tablet 1,000 mg         -- Oral With breakfast 02/10/23 1317    02/10/23 1514  insulin aspart U-100 injection 0-5 Units         -- SubQ Before meals & nightly PRN 02/10/23 1415        Hold Oral hypoglycemics while patient is in the hospital.    Atrial fibrillation  Patient with Long standing persistent (>12 months) atrial fibrillation which is controlled currently with Beta  Blocker. Patient is currently in sinus rhythm.[id number] Score: 4. HASBLED Score: . Anticoagulation         HTN (hypertension)  Continue home meds and adjust as needed.         VTE Risk Mitigation (From admission, onward)         Ordered     warfarin (COUMADIN) tablet 4 mg  Every Tues, Thurs, Sat, Sun         02/10/23 1317     IP VTE HIGH RISK PATIENT  Once         02/10/23 1311     Place sequential compression device  Until discontinued         02/10/23 1311                Discharge Planning   CHANTAL:      Code Status: Full Code   Is the patient medically ready for discharge?:     Reason for patient still in hospital (select all that apply): Patient trending condition and PT / OT recommendations                     Jorge Buckley DO  Department of Hospital Medicine   [facility]

## 2023-02-15 NOTE — PT/OT/SLP PROGRESS
"Occupational Therapy   Treatment    Name: Karina Rojas  MRN: [id number]  Admitting Diagnosis:  Status post hip surgery       Recommendations:     Discharge Recommendations: home with home health, home health PT, home health OT  Discharge Equipment Recommendations:  hip kit  Barriers to discharge:  None    Assessment:     Karina Rojas is a 82 y.o. female with a medical diagnosis of Status post hip surgery.  She presents with "I couldn't even stand up earlier today; no, honey, I can't get out of bed, but I'll do some exercises laying here.". Performance deficits affecting function are weakness, impaired endurance, impaired self care skills, impaired functional mobility, gait instability, impaired balance, decreased lower extremity function, pain, impaired skin, impaired cardiopulmonary response to activity, orthopedic precautions.     Pt barriers to progression to PLOF are nausea, pain, poor tolerance of upright out of bed sitting.      Rehab Prognosis:  Poor and within this environment, pt may possibly do better at home with home health therapy but will continue to try to encourage pt cooperation here ; patient would benefit from acute skilled OT services to address these deficits and reach maximum level of function.       Plan:     Patient to be seen 5 x/week to address the above listed problems via self-care/home management, therapeutic activities, therapeutic exercises, wheelchair management/training  Plan of Care Expires: 03/10/23  Plan of Care Reviewed with: patient, daughter    Subjective     Pain/Comfort:       Objective:     Communicated with: pt prior to session.  Patient found HOB elevated with SCD upon OT entry to room.    General Precautions: Standard, diabetic, fall    Orthopedic Precautions:RLE weight bearing as tolerated  Braces: N/A  Respiratory Status: Nasal cannula, flow 3 L/min     Occupational Performance:     Bed Mobility:    Pt refused    Functional Mobility/Transfers:  Pt " "refused    Activities of Daily Living:  None completed with OT today      [facility] 6 Click ADL:      Treatment & Education:  Pt was agreeable to bedside tx with the following exercises/activity: red putty x 10 minutes, 1# dowel in ch press, sh flexion, and horiz abd/add x 15 reps x 2 sets from supine HOB elevated position.  Provided education to pt and reviewed hip precautions as well as activity importance to avoid DU's, DVT's, pneumonia, and deconditioning.  Pt states, "I know, I know,  I need to get up out of this bed...maybe tomorrow"    Patient left HOB elevated with call button in reach    GOALS:   Multidisciplinary Problems       Occupational Therapy Goals          Problem: Occupational Therapy    Goal Priority Disciplines Outcome Interventions   Occupational Therapy Goal     OT, PT/OT Ongoing, Progressing    Description: STG: within 2 weeks  Pt will perform grooming with setup at sinkside  Pt will bathe with min a at sinkside  Pt will perform UE dressing with svn at sinkside  Pt will perform LE dressing with min a sinkside  Pt will sit EOB x 30 min with min assistance  Pt will transfer bed/chair/bsc with SBA and RW  Pt will perform standing task x 3 min with CGA/SBA assistance  Pt will tolerate 35 minutes of tx without fatigue      LT.Restore to max I with self care and mobility.                          Time Tracking:     OT Date of Treatment: 02/15/23  OT Start Time: 1441  OT Stop Time: 1504  OT Total Time (min): 23 min    Billable Minutes:Therapeutic Activity 8  Therapeutic Exercise 15    OT/HELENA: OT          2/15/2023  "

## 2023-02-15 NOTE — NURSING
Nurses Note -- 4 Eyes      2/14/2023   7:19 PM      Skin assessed during: Q Shift Change      [] No Pressure Injuries Present    []Prevention Measures Documented      [] Yes- Altered Skin Integrity Present or Discovered   [] LDA Added if Not in Epic (Describe Wound)   [x] New Altered Skin Integrity was Present on Admit and Documented in LDA   [] Wound Image Taken    Wound Care Consulted? No    Attending Nurse:  Gaby Galdamez LPN     Second RN/Staff Member:  Rosangela MARKHAM

## 2023-02-15 NOTE — PROGRESS NOTES
Patient requested to hold off on pm PT treatment due to reports of heel pain and fatigue. Will follow up tomorrow.

## 2023-02-15 NOTE — SUBJECTIVE & OBJECTIVE
Interval History: some nausea, better with zofran    Review of Systems   Respiratory:  Negative for shortness of breath.    Cardiovascular:  Negative for chest pain.   Gastrointestinal:  Positive for nausea. Negative for abdominal pain and vomiting.   Musculoskeletal:  Positive for arthralgias and back pain.   Psychiatric/Behavioral:  Negative for agitation and confusion.    All other systems reviewed and are negative.  Objective:     Vital Signs (Most Recent):  Temp: 98 °F (36.7 °C) (02/15/23 0800)  Pulse: 64 (02/15/23 1258)  Resp: 20 (02/15/23 0800)  BP: (!) 131/44 (02/15/23 1258)  SpO2: 95 % (02/15/23 0800)   Vital Signs (24h Range):  Temp:  [98 °F (36.7 °C)] 98 °F (36.7 °C)  Pulse:  [60-64] 64  Resp:  [15-20] 20  SpO2:  [95 %-98 %] 95 %  BP: (131-141)/(40-44) 131/44     Weight: 66.2 kg (145 lb 15.1 oz)  Body mass index is 22.86 kg/m².    Intake/Output Summary (Last 24 hours) at 2/15/2023 1304  Last data filed at 2/15/2023 0747  Gross per 24 hour   Intake 840 ml   Output --   Net 840 ml      Physical Exam  Vitals reviewed.   Constitutional:       General: She is not in acute distress.     Appearance: Normal appearance.   HENT:      Head: Normocephalic and atraumatic.   Eyes:      General: No scleral icterus.     Extraocular Movements: Extraocular movements intact.      Conjunctiva/sclera: Conjunctivae normal.      Pupils: Pupils are equal, round, and reactive to light.   Cardiovascular:      Rate and Rhythm: Normal rate and regular rhythm.      Heart sounds: No murmur heard.    No friction rub. No gallop.   Pulmonary:      Effort: Pulmonary effort is normal. No respiratory distress.      Breath sounds: Normal breath sounds. No wheezing or rales.   Abdominal:      General: Abdomen is flat. Bowel sounds are normal. There is no distension.      Palpations: Abdomen is soft.      Tenderness: There is no abdominal tenderness. There is no guarding.   Musculoskeletal:         General: No swelling.      Comments: Hip pain  ok    Skin:     General: Skin is warm and dry.      Coloration: Skin is not jaundiced.      Findings: No rash.   Neurological:      General: No focal deficit present.      Mental Status: She is alert and oriented to person, place, and time.      Sensory: No sensory deficit.      Motor: Weakness present.   Psychiatric:         Mood and Affect: Mood normal.         Behavior: Behavior normal.         Thought Content: Thought content normal.       Significant Labs: All pertinent labs within the past 24 hours have been reviewed.  Recent Lab Results  (Last 5 results in the past 24 hours)        02/15/23  1125   02/15/23  0548   02/15/23  0541   02/14/23  1933   02/14/23  1630        Digoxin Lvl   1.6             POC Glucose 204     115   200   200                              Significant Imaging: I have reviewed all pertinent imaging results/findings within the past 24 hours.

## 2023-02-16 ENCOUNTER — CLINICAL SUPPORT (OUTPATIENT)
Dept: WOUND CARE | Facility: HOSPITAL | Age: 83
DRG: 561 | End: 2023-02-16
Attending: HOSPITALIST
Payer: MEDICARE

## 2023-02-16 DIAGNOSIS — I73.9 PAD (PERIPHERAL ARTERY DISEASE): ICD-10-CM

## 2023-02-16 DIAGNOSIS — L89.626 PRESSURE INJURY OF DEEP TISSUE OF LEFT HEEL: ICD-10-CM

## 2023-02-16 DIAGNOSIS — L89.616 PRESSURE INJURY OF DEEP TISSUE OF RIGHT HEEL: Primary | ICD-10-CM

## 2023-02-16 LAB
GLUCOSE SERPL-MCNC: 154 MG/DL (ref 70–105)
GLUCOSE SERPL-MCNC: 159 MG/DL (ref 70–105)
GLUCOSE SERPL-MCNC: 173 MG/DL (ref 70–105)
GLUCOSE SERPL-MCNC: 229 MG/DL (ref 70–105)

## 2023-02-16 PROCEDURE — 25000003 PHARM REV CODE 250

## 2023-02-16 PROCEDURE — 63600175 PHARM REV CODE 636 W HCPCS

## 2023-02-16 PROCEDURE — 97110 THERAPEUTIC EXERCISES: CPT | Mod: CQ

## 2023-02-16 PROCEDURE — 27000221 HC OXYGEN, UP TO 24 HOURS

## 2023-02-16 PROCEDURE — 25000003 PHARM REV CODE 250: Performed by: HOSPITALIST

## 2023-02-16 PROCEDURE — 11000004 HC SNF PRIVATE

## 2023-02-16 PROCEDURE — 99212 OFFICE O/P EST SF 10 MIN: CPT

## 2023-02-16 PROCEDURE — 82962 GLUCOSE BLOOD TEST: CPT

## 2023-02-16 PROCEDURE — 97530 THERAPEUTIC ACTIVITIES: CPT | Mod: CQ

## 2023-02-16 RX ORDER — MUPIROCIN 20 MG/G
OINTMENT TOPICAL DAILY
Status: DISCONTINUED | OUTPATIENT
Start: 2023-02-16 | End: 2023-03-08

## 2023-02-16 RX ADMIN — ROPINIROLE 0.25 MG: 0.25 TABLET, FILM COATED ORAL at 08:02

## 2023-02-16 RX ADMIN — ATORVASTATIN CALCIUM 40 MG: 40 TABLET, FILM COATED ORAL at 08:02

## 2023-02-16 RX ADMIN — MUPIROCIN: 20 OINTMENT TOPICAL at 12:02

## 2023-02-16 RX ADMIN — AMIODARONE HYDROCHLORIDE 200 MG: 200 TABLET ORAL at 08:02

## 2023-02-16 RX ADMIN — MELATONIN 6 MG: at 08:02

## 2023-02-16 RX ADMIN — MICONAZOLE NITRATE 2 % TOPICAL POWDER: at 09:02

## 2023-02-16 RX ADMIN — PREDNISONE 5 MG: 5 TABLET ORAL at 08:02

## 2023-02-16 RX ADMIN — AMIODARONE HYDROCHLORIDE 200 MG: 200 TABLET ORAL at 09:02

## 2023-02-16 RX ADMIN — MICONAZOLE NITRATE 2 % TOPICAL POWDER: at 08:02

## 2023-02-16 RX ADMIN — OXYCODONE HYDROCHLORIDE 10 MG: 10 TABLET ORAL at 08:02

## 2023-02-16 RX ADMIN — ZINC OXIDE TOPICAL OINT.: at 09:02

## 2023-02-16 RX ADMIN — DIGOXIN 0.12 MG: 125 TABLET ORAL at 08:02

## 2023-02-16 RX ADMIN — FAMOTIDINE 20 MG: 20 TABLET, FILM COATED ORAL at 08:02

## 2023-02-16 RX ADMIN — LEVOTHYROXINE SODIUM 25 MCG: 0.03 TABLET ORAL at 04:02

## 2023-02-16 RX ADMIN — WARFARIN SODIUM 4 MG: 1 TABLET ORAL at 04:02

## 2023-02-16 RX ADMIN — INSULIN ASPART 2 UNITS: 100 INJECTION, SOLUTION INTRAVENOUS; SUBCUTANEOUS at 05:02

## 2023-02-16 RX ADMIN — OXYCODONE HYDROCHLORIDE 10 MG: 10 TABLET ORAL at 04:02

## 2023-02-16 NOTE — PROGRESS NOTES
"NEW WOUND CARE CONSULT          Patient Name: Karina Rojas is a 82 y.o. female       Chief Complaint:  New Wound Care Consult    Review of patient's allergies indicates:   Allergen Reactions    Tylenol [acetaminophen] Anaphylaxis and Shortness Of Breath        I have reviewed the patient's medical, surgical, family and social history.      Subjective:    HPI     Wound Location: bilateral heels, left dorsal foot    Wound Duration: onset 2/7/23    Wound Context: DTIs (pressure)    Wound Pain: moderate  83 YO WF currently in swing bed following right total hip arthroplasty. Pt has h/o PAD and is established with Dr. Bazan, but has not had recent follow up. In the past, the patient reports he did not want to intervene below the knee because she "wasn't having any problems." Pt smoked for over 60 years, but quit last September.         Review of Systems   Constitutional:  Negative for appetite change and fever.   Respiratory:  Positive for shortness of breath.    Cardiovascular:  Positive for claudication. Negative for chest pain and leg swelling.   Musculoskeletal:  Positive for leg pain.   Integumentary:  Positive for color change and wound. Negative for mole/lesion.   Neurological:  Positive for weakness. Negative for numbness.   Hematological:  Bruises/bleeds easily (coumadin).   Psychiatric/Behavioral:  The patient is not nervous/anxious.       Medications:    Current Facility-Administered Medications on File Prior to Visit   Medication Dose Route Frequency Provider Last Rate Last Admin    amiodarone tablet 200 mg  200 mg Oral BID PREETHI Henson   200 mg at 02/16/23 0915    atorvastatin tablet 40 mg  40 mg Oral QHS PREETHI Henson   40 mg at 02/15/23 2024    calcium carbonate 200 mg calcium (500 mg) chewable tablet 500 mg  500 mg Oral BID PRN Jorge Buckley,         dextrose 50% injection 12.5 g  12.5 g Intravenous PRN PREETHI Henson        dextrose 50% injection 25 g  25 g Intravenous PRN " Sean Corley, PREETHI        digoxin tablet 0.125 mg  0.125 mg Oral Daily Sean Corley FNP   0.125 mg at 02/16/23 0817    famotidine tablet 20 mg  20 mg Oral BID Sean Corley FNP   20 mg at 02/16/23 0817    furosemide tablet 40 mg  40 mg Oral Daily Sean Corley, FNP   40 mg at 02/15/23 0825    glucagon (human recombinant) injection 1 mg  1 mg Intramuscular PRN PREETHI Henson        glucose chewable tablet 16 g  16 g Oral PRN PREETHI Henson        glucose chewable tablet 24 g  24 g Oral PRN PREETHI Henson        insulin aspart U-100 injection 0-5 Units  0-5 Units Subcutaneous QID (AC + HS) PRN PADDY HensonP   2 Units at 02/15/23 1137    levothyroxine tablet 25 mcg  25 mcg Oral Before breakfast Sean Corley FNP   25 mcg at 02/16/23 0450    magnesium hydroxide 400 mg/5 ml suspension 2,400 mg  30 mL Oral Daily PRN Jorge Buckley DO   2,400 mg at 02/13/23 1634    melatonin tablet 6 mg  6 mg Oral Nightly PRN Jorge Buckley DO   6 mg at 02/15/23 2024    miconazole NITRATE 2 % top powder   Topical (Top) BID Jorge Buckley DO   Given at 02/16/23 0914    ondansetron disintegrating tablet 4 mg  4 mg Oral Q8H PRN Jorge Buckley DO   4 mg at 02/14/23 2332    oxyCODONE immediate release tablet Tab 10 mg  10 mg Oral Q6H PRN PADDY HensonP   10 mg at 02/16/23 0450    predniSONE tablet 5 mg  5 mg Oral Daily PADDY HensonP   5 mg at 02/16/23 0817    prochlorperazine injection Soln 5 mg  5 mg Intramuscular Q6H PRN PADDY HensonP   5 mg at 02/13/23 1740    rOPINIRole tablet 0.25 mg  0.25 mg Oral BID Jorge Buckley DO   0.25 mg at 02/16/23 0817    senna-docusate 8.6-50 mg per tablet 1 tablet  1 tablet Oral BID PRN Jorge Buckley DO   1 tablet at 02/15/23 1520    warfarin (COUMADIN) tablet 4 mg  4 mg Oral Every Tues, Que Iglesias, Tayla Corley, PADDYP   4 mg at 02/14/23 1235    zinc oxide 16% (NUNO'S BUTT PASTE) topical ointment   Topical (Top) Daily Jorge  DO Marcio   Given at 02/16/23 0915    [DISCONTINUED] GENERIC EXTERNAL MEDICATION     Generic External Data Provider         Current Outpatient Medications on File Prior to Visit   Medication Sig Dispense Refill    amiodarone (PACERONE) 200 MG Tab Take 200 mg by mouth 2 (two) times daily. Take 1 tablet by mouth in the morning and 1 tablet before bedtime.      atorvastatin (LIPITOR) 40 MG tablet Take 40 mg by mouth.      digoxin (LANOXIN) 125 mcg tablet Take 125 mcg by mouth.      fluticasone/umeclidin/vilanter (TRELEGY ELLIPTA INHL) Inhale into the lungs daily as needed.      furosemide (LASIX) 40 MG tablet Take 40 mg by mouth once daily.      levothyroxine (SYNTHROID) 50 MCG tablet Take 1 tablet (50 mcg total) by mouth before breakfast. (Patient taking differently: Take 25 mcg by mouth before breakfast.) 30 tablet 11    metFORMIN (GLUCOPHAGE) 1000 MG tablet Take 1,000 mg by mouth daily with breakfast.      oxyCODONE (OXYCONTIN) 10 mg 12 hr tablet Take 10 mg by mouth every 6 (six) hours as needed for Pain.      predniSONE (DELTASONE) 5 MG tablet Take 5 mg by mouth.      warfarin (COUMADIN) 4 MG tablet Take 4 mg by mouth. Take 1 tablet by mouth 4 times a week.      warfarin (COUMADIN) 5 MG tablet Take by mouth.            Physical Exam  Vitals reviewed.   Constitutional:       Appearance: She is ill-appearing.      Interventions: Nasal cannula in place.   HENT:      Head: Normocephalic and atraumatic.      Right Ear: External ear normal.      Left Ear: External ear normal.   Cardiovascular:      Rate and Rhythm: Normal rate.      Pulses:           Dorsalis pedis pulses are 0 on the right side and 0 on the left side.        Posterior tibial pulses are detected w/ Doppler on the right side and detected w/ Doppler on the left side.   Pulmonary:      Effort: Pulmonary effort is normal.      Breath sounds: Decreased air movement present.   Musculoskeletal:      Right lower leg: No edema.      Left lower leg: No edema.       Right foot: Foot drop present.        Feet:    Feet:      Right foot:      Skin integrity: Ulcer (DTI heel) present.      Left foot:      Skin integrity: Ulcer (DTI heel) present.   Skin:     General: Skin is warm and dry.      Findings: Wound present.   Neurological:      Mental Status: She is alert.        Please see Wound Docs for complete Wound Assessment and lower extremity assessment when applicable.    Documentation of any procedures can be viewed in Wound Docs if applicable.         Assessment and Plan:    1. Pressure injury of deep tissue of right heel  Betadine daily  Offload  Cover with foam  OK to ambulate with therapy    2. Pressure injury of deep tissue of left heel  As above    3. PAD (peripheral artery disease)  Pt needs follow up with vascular surgery. We will set up appt with Dr. Bazan         Please see Wound Docs for complete plan including patient instructions.     Follow Up & Goals:     Follow up in about 1 week (around 2/23/2023).     Short term goals  Reduction of devitalized tissue  Reduction of bacterial burden  Stimulate and/or maintain acute phases of wound healing  Promote granulation formation  Promote epithelization  Promote compliance with plan of care  Address factors affecting wound healing  Optimize nutritional status  Optimize vascular status    Long term goals  Prevent loss of limb  Prevent infection  Conservative Wound Treatment  Prevent recurrent ulcerations  Resolve wounds

## 2023-02-16 NOTE — PLAN OF CARE
Problem: Adult Inpatient Plan of Care  Goal: Plan of Care Review  Outcome: Ongoing, Progressing  Goal: Patient-Specific Goal (Individualized)  Outcome: Ongoing, Progressing  Goal: Absence of Hospital-Acquired Illness or Injury  Outcome: Ongoing, Progressing  Goal: Optimal Comfort and Wellbeing  Outcome: Ongoing, Progressing  Goal: Readiness for Transition of Care  Outcome: Ongoing, Progressing     Problem: Diabetes Comorbidity  Goal: Blood Glucose Level Within Targeted Range  Outcome: Ongoing, Progressing     Problem: Fall Injury Risk  Goal: Absence of Fall and Fall-Related Injury  Outcome: Ongoing, Progressing     Problem: Impaired Wound Healing  Goal: Optimal Wound Healing  Outcome: Ongoing, Progressing     Problem: Skin Injury Risk Increased  Goal: Skin Health and Integrity  Outcome: Ongoing, Progressing     Problem: Gas Exchange Impaired  Goal: Optimal Gas Exchange  Outcome: Ongoing, Progressing

## 2023-02-16 NOTE — PT/OT/SLP PROGRESS
"Occupational Therapy      Patient Name:  Karina Rojas   MRN:  [id number]    Patient not seen today secondary to Patient unwilling to participate (pt states, "I'm not doing anything this morning.  I've got wound care coming, too."). Will follow-up tomorrow.    2/16/2023  "

## 2023-02-16 NOTE — NURSING
Nurses Note -- 4 Eyes      2/15/2023   1901 PM      Skin assessed during: Q Shift Change      [] No Pressure Injuries Present    []Prevention Measures Documented      [x] Yes- Altered Skin Integrity Present or Discovered   [] LDA Added if Not in Epic (Describe Wound)   [] New Altered Skin Integrity was Present on Admit and Documented in LDA   [] Wound Image Taken    Wound Care Consulted? Yes (Wound care consulted today on dayshift)    Attending Nurse:  Gaby Galdamez LPN     Second RN/Staff Member:  Kaleigh Woods RN

## 2023-02-16 NOTE — PT/OT/SLP PROGRESS
Physical Therapy Treatment    Patient Name:  Karina Rojas   MRN:  [id number]    Recommendations:     Discharge Recommendations: home with home health  Discharge Equipment Recommendations: none  Barriers to discharge: Inaccessible home and Decreased caregiver support    Assessment:     Karina Rojas is a 82 y.o. female admitted with a medical diagnosis of Status post hip surgery.  She presents with the following impairments/functional limitations: weakness, impaired endurance, impaired balance, impaired functional mobility, gait instability, pain, decreased lower extremity function, impaired cardiopulmonary response to activity, orthopedic precautions. Patient agreeable to bed exercises this date, and she stated that she did not want to stand due to bilateral heel pain when weight bearing. Patient able to recall 2/3 hip precautions this date. Patient also unable to dorsiflex L foot again this date and required assistance.     Rehab Prognosis: Good; patient would benefit from acute skilled PT services to address these deficits and reach maximum level of function.    Recent Surgery: * No surgery found *      Plan:     During this hospitalization, patient to be seen BID to address the identified rehab impairments via gait training, therapeutic activities, therapeutic exercises and progress toward the following goals:    Plan of Care Expires:  03/24/23    Subjective     Chief Complaint: Patient reported that she had pain in her heels when weight bearing. Patient is scheduled to receive wound care later today.   Patient/Family Comments/goals: Pt plans to dc home upon dc from hosp.  Pain/Comfort:  Pain Rating 1: 0/10      Objective:     Communicated with nursing prior to session. Patient found HOB elevated with oxygen upon PTA entry to room.     General Precautions: Standard, fall  Orthopedic Precautions: RLE weight bearing as tolerated, RLE posterior precautions (No AROM right hip abd.)  Braces:    Respiratory  Status: Nasal cannula, flow 2 L/min     Functional Mobility:  Bed Mobility:    Not completed today.       AM-PAC 6 CLICK MOBILITY  Turning over in bed (including adjusting bedclothes, sheets and blankets)?: 4  Sitting down on and standing up from a chair with arms (e.g., wheelchair, bedside commode, etc.): 3  Moving from lying on back to sitting on the side of the bed?: 3  Moving to and from a bed to a chair (including a wheelchair)?: 2  Need to walk in hospital room?: 2  Climbing 3-5 steps with a railing?: 1  Basic Mobility Total Score: 15       Treatment & Education:  Patient also performed the following therapeutic exercises: SLRs 5 x 2 with Mod A for RLE, ankle pumps 20x with max A for L foot, glute sets 20x, quad sets 20x, Hip ADD squeezes 20x    Patient left HOB elevated with all lines intact and call button in reach.    GOALS:   Multidisciplinary Problems       Physical Therapy Goals          Problem: Physical Therapy    Goal Priority Disciplines Outcome Goal Variances Interventions   Physical Therapy Goal     PT, PT/OT Ongoing, Progressing     Description: STG - 3 weeks  1. Pt min A x 1 with bed mob.  2. Pt min A x 1  with transfers using RW.  3. Pt will amb 25 ft with min A x 1 using RW.  4. Pt will have 3/5 RLE strength.  5. Pt will recall 3/3 SHYANNE precautions.    LTG - 6 weeks  1. Pt SBA with bed mob.  2. Pt SBA with transfers using RW.  3. Pt will amb 50 ft with SBA using RW.  4. Pt will have 4-/5 RLE strength.                       Time Tracking:     PT Received On: 02/16/23  PT Start Time: 1016     PT Stop Time: 1033  PT Total Time (min): 17 min     Billable Minutes: Therapeutic Exercise 17    Treatment Type: Treatment  PT/PTA: PTA     PTA Visit Number: 3     02/16/2023

## 2023-02-16 NOTE — PT/OT/SLP PROGRESS
Physical Therapy Treatment    Patient Name:  Karina Rojas   MRN:  [id number]    Recommendations:     Discharge Recommendations: home with home health  Discharge Equipment Recommendations: none  Barriers to discharge: Inaccessible home and Decreased caregiver support    Assessment:     Karina Rojas is a 82 y.o. female admitted with a medical diagnosis of Status post hip surgery.  She presents with the following impairments/functional limitations: impaired endurance, weakness, impaired functional mobility, gait instability, impaired balance, decreased lower extremity function, pain, orthopedic precautions. Patient attempted to stand 2x, but she was unable due to reports of bilateral heel pain. Patient reported R distal calf tenderness when PTA was lifting her leg for bed mobility. Patient stated that she had back pain after completion of treatment, but she declined ice or heat application at this time.      Rehab Prognosis: Good; patient would benefit from acute skilled PT services to address these deficits and reach maximum level of function.    Recent Surgery: * No surgery found *      Plan:     During this hospitalization, patient to be seen BID to address the identified rehab impairments via gait training, therapeutic activities, therapeutic exercises and progress toward the following goals:    Plan of Care Expires:  03/24/23    Subjective     Chief Complaint: Patient reported that she had pain in her heels during stand attempts.   Patient/Family Comments/goals: Pt plans to dc home upon dc from hosp. Patient stated that the nurse practitioner with wound care told her that she has drop foot in her L foot.   Pain/Comfort:  Pain Rating 1: 0/10      Objective:     Communicated with nursing prior to session. Patient found HOB elevated with oxygen upon PTA entry to room.     General Precautions: Standard, fall  Orthopedic Precautions: RLE weight bearing as tolerated, RLE posterior precautions (No AROM right hip  abd.)  Braces:    Respiratory Status: Nasal cannula, flow 3 L/min     Functional Mobility:  Bed Mobility:     Supine to Sit: moderate assistance and for BLE  Sit to Supine: moderate assistance and for BLE  Patient attempted to stand 2x, but she was unable due to reports of heel pain.       AM-PAC 6 CLICK MOBILITY  Turning over in bed (including adjusting bedclothes, sheets and blankets)?: 4  Sitting down on and standing up from a chair with arms (e.g., wheelchair, bedside commode, etc.): 1  Moving from lying on back to sitting on the side of the bed?: 3  Moving to and from a bed to a chair (including a wheelchair)?: 1  Need to walk in hospital room?: 1  Climbing 3-5 steps with a railing?: 1  Basic Mobility Total Score: 11       Treatment & Education:  Patient performed the following therapeutic exercises: SLRs 5 x 2 with Mod A for RLE, ankle pumps 10x with max A for L foot, glute sets 20x, quad sets 20x, Hip ADD squeezes 20x, LAQs 20x BLE    Patient left HOB elevated with all lines intact and call button in reach.    GOALS:   Multidisciplinary Problems       Physical Therapy Goals          Problem: Physical Therapy    Goal Priority Disciplines Outcome Goal Variances Interventions   Physical Therapy Goal     PT, PT/OT Ongoing, Progressing     Description: STG - 3 weeks  1. Pt min A x 1 with bed mob.  2. Pt min A x 1  with transfers using RW.  3. Pt will amb 25 ft with min A x 1 using RW.  4. Pt will have 3/5 RLE strength.  5. Pt will recall 3/3 SHYANNE precautions.    LTG - 6 weeks  1. Pt SBA with bed mob.  2. Pt SBA with transfers using RW.  3. Pt will amb 50 ft with SBA using RW.  4. Pt will have 4-/5 RLE strength.                       Time Tracking:     PT Received On: 02/16/23  PT Start Time: 1312     PT Stop Time: 1338  PT Total Time (min): 26 min     Billable Minutes: Therapeutic Activity 8 and Therapeutic Exercise 18    Treatment Type: Treatment  PT/PTA: PTA     PTA Visit Number: 4     02/16/2023

## 2023-02-17 LAB
ANION GAP SERPL CALCULATED.3IONS-SCNC: 9 MMOL/L (ref 7–16)
BASOPHILS # BLD AUTO: 0.04 K/UL (ref 0–0.2)
BASOPHILS NFR BLD AUTO: 0.3 % (ref 0–1)
BUN SERPL-MCNC: 18 MG/DL (ref 7–18)
BUN/CREAT SERPL: 19 (ref 6–20)
CALCIUM SERPL-MCNC: 8.9 MG/DL (ref 8.5–10.1)
CHLORIDE SERPL-SCNC: 94 MMOL/L (ref 98–107)
CO2 SERPL-SCNC: 38 MMOL/L (ref 21–32)
CREAT SERPL-MCNC: 0.97 MG/DL (ref 0.55–1.02)
DIFFERENTIAL METHOD BLD: ABNORMAL
EGFR (NO RACE VARIABLE) (RUSH/TITUS): 59 ML/MIN/1.73M²
EOSINOPHIL # BLD AUTO: 0.11 K/UL (ref 0–0.5)
EOSINOPHIL NFR BLD AUTO: 0.8 % (ref 1–4)
ERYTHROCYTE [DISTWIDTH] IN BLOOD BY AUTOMATED COUNT: 16.3 % (ref 11.5–14.5)
GLUCOSE SERPL-MCNC: 114 MG/DL (ref 70–105)
GLUCOSE SERPL-MCNC: 190 MG/DL (ref 74–106)
GLUCOSE SERPL-MCNC: 213 MG/DL (ref 70–105)
GLUCOSE SERPL-MCNC: 220 MG/DL (ref 70–105)
GLUCOSE SERPL-MCNC: 224 MG/DL (ref 70–105)
HCT VFR BLD AUTO: 32.2 % (ref 38–47)
HGB BLD-MCNC: 9.4 G/DL (ref 12–16)
INR BLD: 2.59
LYMPHOCYTES # BLD AUTO: 0.8 K/UL (ref 1–4.8)
LYMPHOCYTES NFR BLD AUTO: 6.1 % (ref 27–41)
MCH RBC QN AUTO: 27.6 PG (ref 27–31)
MCHC RBC AUTO-ENTMCNC: 29.2 G/DL (ref 32–36)
MCV RBC AUTO: 94.7 FL (ref 80–96)
MONOCYTES # BLD AUTO: 0.63 K/UL (ref 0–0.8)
MONOCYTES NFR BLD AUTO: 4.8 % (ref 2–6)
MPC BLD CALC-MCNC: 9.3 FL (ref 9.4–12.4)
NEUTROPHILS # BLD AUTO: 11.43 K/UL (ref 1.8–7.7)
NEUTROPHILS NFR BLD AUTO: 88 % (ref 53–65)
PLATELET # BLD AUTO: 482 K/UL (ref 150–400)
POTASSIUM SERPL-SCNC: 4.4 MMOL/L (ref 3.5–5.1)
PROTHROMBIN TIME: 27.7 SECONDS (ref 11.7–14.7)
RBC # BLD AUTO: 3.4 M/UL (ref 4.2–5.4)
SODIUM SERPL-SCNC: 137 MMOL/L (ref 136–145)
WBC # BLD AUTO: 13.01 K/UL (ref 4.5–11)

## 2023-02-17 PROCEDURE — 11000004 HC SNF PRIVATE

## 2023-02-17 PROCEDURE — 94761 N-INVAS EAR/PLS OXIMETRY MLT: CPT

## 2023-02-17 PROCEDURE — 97530 THERAPEUTIC ACTIVITIES: CPT | Mod: CQ

## 2023-02-17 PROCEDURE — 63600175 PHARM REV CODE 636 W HCPCS

## 2023-02-17 PROCEDURE — 80048 BASIC METABOLIC PNL TOTAL CA: CPT | Performed by: NURSE PRACTITIONER

## 2023-02-17 PROCEDURE — 97110 THERAPEUTIC EXERCISES: CPT

## 2023-02-17 PROCEDURE — 27000221 HC OXYGEN, UP TO 24 HOURS

## 2023-02-17 PROCEDURE — 25000003 PHARM REV CODE 250: Performed by: HOSPITALIST

## 2023-02-17 PROCEDURE — 85025 COMPLETE CBC W/AUTO DIFF WBC: CPT | Performed by: NURSE PRACTITIONER

## 2023-02-17 PROCEDURE — 82962 GLUCOSE BLOOD TEST: CPT

## 2023-02-17 PROCEDURE — 85610 PROTHROMBIN TIME: CPT

## 2023-02-17 PROCEDURE — 99900035 HC TECH TIME PER 15 MIN (STAT)

## 2023-02-17 PROCEDURE — 25000003 PHARM REV CODE 250

## 2023-02-17 RX ADMIN — FAMOTIDINE 20 MG: 20 TABLET, FILM COATED ORAL at 09:02

## 2023-02-17 RX ADMIN — INSULIN ASPART 1 UNITS: 100 INJECTION, SOLUTION INTRAVENOUS; SUBCUTANEOUS at 09:02

## 2023-02-17 RX ADMIN — SENNOSIDES AND DOCUSATE SODIUM 1 TABLET: 8.6; 5 TABLET ORAL at 08:02

## 2023-02-17 RX ADMIN — ATORVASTATIN CALCIUM 40 MG: 40 TABLET, FILM COATED ORAL at 09:02

## 2023-02-17 RX ADMIN — ROPINIROLE 0.25 MG: 0.25 TABLET, FILM COATED ORAL at 09:02

## 2023-02-17 RX ADMIN — MUPIROCIN: 20 OINTMENT TOPICAL at 08:02

## 2023-02-17 RX ADMIN — AMIODARONE HYDROCHLORIDE 200 MG: 200 TABLET ORAL at 09:02

## 2023-02-17 RX ADMIN — MICONAZOLE NITRATE 2 % TOPICAL POWDER: at 10:02

## 2023-02-17 RX ADMIN — LEVOTHYROXINE SODIUM 25 MCG: 0.03 TABLET ORAL at 06:02

## 2023-02-17 RX ADMIN — INSULIN ASPART 2 UNITS: 100 INJECTION, SOLUTION INTRAVENOUS; SUBCUTANEOUS at 11:02

## 2023-02-17 RX ADMIN — OXYCODONE HYDROCHLORIDE 10 MG: 10 TABLET ORAL at 05:02

## 2023-02-17 RX ADMIN — ROPINIROLE 0.25 MG: 0.25 TABLET, FILM COATED ORAL at 08:02

## 2023-02-17 RX ADMIN — MICONAZOLE NITRATE 2 % TOPICAL POWDER: at 08:02

## 2023-02-17 RX ADMIN — OXYCODONE HYDROCHLORIDE 10 MG: 10 TABLET ORAL at 06:02

## 2023-02-17 RX ADMIN — FAMOTIDINE 20 MG: 20 TABLET, FILM COATED ORAL at 08:02

## 2023-02-17 RX ADMIN — INSULIN ASPART 2 UNITS: 100 INJECTION, SOLUTION INTRAVENOUS; SUBCUTANEOUS at 05:02

## 2023-02-17 RX ADMIN — PREDNISONE 5 MG: 5 TABLET ORAL at 08:02

## 2023-02-17 RX ADMIN — MAGNESIUM HYDROXIDE 2400 MG: 1200 LIQUID ORAL at 08:02

## 2023-02-17 RX ADMIN — ZINC OXIDE TOPICAL OINT.: at 08:02

## 2023-02-17 NOTE — PT/OT/SLP PROGRESS
Physical Therapy Treatment    Patient Name:  Karina Rojas   MRN:  [id number]    Recommendations:     Discharge Recommendations: home with home health  Discharge Equipment Recommendations: none  Barriers to discharge: Inaccessible home and Decreased caregiver support    Assessment:     Karina Rojas is a 82 y.o. female admitted with a medical diagnosis of Status post hip surgery.  She presents with the following impairments/functional limitations: weakness, impaired functional mobility, impaired balance, gait instability, decreased lower extremity function, pain, impaired skin, impaired cardiopulmonary response to activity, orthopedic precautions .     Pt seen from 1651-8152 and then from 3167-1354 when PT transferred her back to bed and properly positioned her with pressure-relieving pillows.    Rehab Prognosis: Fair; patient would benefit from acute skilled PT services to address these deficits and reach maximum level of function.    Recent Surgery: * No surgery found *      Plan:     During this hospitalization, patient to be seen BID to address the identified rehab impairments via gait training, therapeutic activities, therapeutic exercises, wheelchair management/training and progress toward the following goals:    Plan of Care Expires:  03/24/23    Subjective     Chief Complaint: Pt reports having left foot drop. Has vascular appt next week. Pt declined to amb. States bedside chair uncomfortable, but willing to sit up in wc. After sitting up one hour, pt states she was fatigued and needed to get back in bed which was done.  Patient/Family Comments/goals: DC home with dtr.  Pain/Comfort:  Pain Rating 1: 8/10  Location - Side 1: Bilateral  Location - Orientation 1: lower  Location 1: heel  Pain Addressed 1: Pre-medicate for activity, Reposition  Pain Rating Post-Intervention 1: 8/10  Pain Rating 2: 5/10  Location - Side 2: Bilateral  Location - Orientation 2: midline  Location 2: back  Pain Addressed 2:  "Pre-medicate for activity  Pain Rating Post-Intervention 2: 5/10      Objective:     Communicated with pt prior to session.  Patient found HOB elevated with oxygen upon PT entry to room.     General Precautions: Standard, fall, respiratory  Orthopedic Precautions: RLE weight bearing as tolerated (No active R hip ABD)  Braces:    Respiratory Status: Nasal cannula, flow 3 L/min     Functional Mobility:  Bed Mobility:     Rolling Right: stand by assistance  Scooting: minimum assistance  Supine to Sit: minimum assistance  Sit to Supine: moderate assistance  Transfers:     Sit to Stand:  minimum assistance with rolling walker  Bed to Chair: minimum assistance with  rolling walker  using  Stand Pivot  Gait: Pt was only able to take 2-3 steps from bed to bedside chair. Left forefoot drag.      AM-PAC 6 CLICK MOBILITY  Turning over in bed (including adjusting bedclothes, sheets and blankets)?: 4  Sitting down on and standing up from a chair with arms (e.g., wheelchair, bedside commode, etc.): 3  Moving from lying on back to sitting on the side of the bed?: 3  Moving to and from a bed to a chair (including a wheelchair)?: 3  Need to walk in hospital room?: 3  Climbing 3-5 steps with a railing?: 1  Basic Mobility Total Score: 17       Treatment & Education:  Pt received very gentle passive stretching aleah calves with pressure on forefoot only 1x30" aleah. RLE ther ex: HS X 15, hip add x 15, hip IR to neutral x 15, LAQ, AP (AAROM LLE). Pt stood up from bed with min A and perform SPT bed to  which PT had procurred. Pt sat up x 1 hour in  before requesting to get back in bed. PT positioned BLE on two pillows under calves with heels floating freely.    Patient left HOB elevated with all lines intact..    GOALS:   Multidisciplinary Problems       Physical Therapy Goals          Problem: Physical Therapy    Goal Priority Disciplines Outcome Goal Variances Interventions   Physical Therapy Goal     PT, PT/OT Ongoing, Progressing   "   Description: STG - 3 weeks  1. Pt min A x 1 with bed mob.  2. Pt min A x 1  with transfers using RW.  3. Pt will amb 25 ft with min A x 1 using RW.  4. Pt will have 3/5 RLE strength.  5. Pt will recall 3/3 SHYANNE precautions.    LTG - 6 weeks  1. Pt SBA with bed mob.  2. Pt SBA with transfers using RW.  3. Pt will amb 50 ft with SBA using RW.  4. Pt will have 4-/5 RLE strength.                       Time Tracking:     PT Received On: 02/17/23  PT Start Time: 1028 (6217-4113)     PT Stop Time: 1200 ([id number])  PT Total Time (min): 92 min (33 min billeable)    Billable Minutes: Therapeutic Activity 15, Therapeutic Exercise 16, and Total Time 33 min    Treatment Type: Treatment  PT/PTA: PT     PTA Visit Number: 1     02/17/2023

## 2023-02-17 NOTE — PLAN OF CARE
Problem: Occupational Therapy  Goal: Occupational Therapy Goal  Description: STG: within 2 weeks  Pt will perform grooming with setup at sinkside MET  Pt will bathe with min a at sinkside MET  Pt will perform UE dressing with svn at sinkside MET  Pt will perform LE dressing with min a sinkside MOD A  Pt will sit EOB x 30 min with min assistance MET  Pt will transfer bed/chair/bsc with SBA and RW ONGOING/PROGRESSING with PT  Pt will perform standing task x 3 min with CGA/SBA assistance ONGOING/PROGRESSING with PT  Pt will tolerate 35 minutes of tx without fatigue ONGOING/PROGRESSING with PT      LT.Restore to max I with self care and mobility.    Pt is refusing occupational therapy due to she is independently performing HOME EXERCISE PROGRAM of red theratubing and red theraputty for hand strength and UB strength at bedside.  She is refusing occupational therapy attempts to remediate her LB self care, but this is of old onset, as last episode of care, she refused frequently LB AD's.  Pt reports she is content to continue with PT care, but she would like to discontinue OT unless she decides she needs it at later date.     Outcome: Ongoing, Not Progressing due to max potential with OT at this time; pt to cont PT per PT assessment of necessity.

## 2023-02-17 NOTE — PLAN OF CARE
Problem: Physical Therapy  Goal: Physical Therapy Goal  Description: STG - 3 weeks  1. Pt min A x 1 with bed mob.-MET  2. Pt min A x 1  with transfers using RW.-MET  3. Pt will amb 25 ft with min A x 1 using RW.-PROGRESSING  4. Pt will have 3/5 RLE strength.-PROGRESSING  5. Pt will recall 3/3 SHYANNE precautions.-NOT MET    LTG - 6 weeks  1. Pt SBA with bed mob.  2. Pt SBA with transfers using RW.  3. Pt will amb 50 ft with SBA using RW.  4. Pt will have 4-/5 RLE strength.  Outcome: Ongoing, Progressing

## 2023-02-17 NOTE — PT/OT/SLP PROGRESS
Physical Therapy Treatment    Patient Name:  Karina Rojas   MRN:  [id number]    Recommendations:     Discharge Recommendations: home with home health  Discharge Equipment Recommendations: none  Barriers to discharge: Inaccessible home and Decreased caregiver support    Assessment:     Karina Rojas is a 82 y.o. female admitted with a medical diagnosis of Status post hip surgery.  She presents with the following impairments/functional limitations: weakness, impaired functional mobility, impaired balance, gait instability, decreased lower extremity function, pain, impaired skin, impaired cardiopulmonary response to activity, orthopedic precautions. Patient able to stand this date, but she reported that she did not think she could walk due to heel pain. Patient stated that she felt nauseas after moving from supine to sit. Patient also stated that she had increased back pain after treatment but when asked if she wanted MHP, cold pack, or for PTA to notify nursing for pain medication she declined.     Rehab Prognosis: Good; patient would benefit from acute skilled PT services to address these deficits and reach maximum level of function.    Recent Surgery: * No surgery found *      Plan:     During this hospitalization, patient to be seen BID to address the identified rehab impairments via gait training, therapeutic activities, therapeutic exercises, wheelchair management/training and progress toward the following goals:    Plan of Care Expires:  03/24/23    Subjective     Chief Complaint: Patient reported that she had pain in her heels while standing, and she stated that her back was hurting.   Patient/Family Comments/goals: Pt plans to dc home upon dc from hosp.   Pain/Comfort:  Pain Rating 1: 7/10  Location - Side 1: Bilateral  Location - Orientation 1: generalized  Location 1: back  Pain Addressed 1: Reposition      Objective:     Communicated with pt prior to session. Patient found HOB elevated with oxygen upon  PTA entry to room.     General Precautions: Standard, fall, respiratory  Orthopedic Precautions: RLE weight bearing as tolerated (No active R hip ABD)  Braces:    Respiratory Status: Nasal cannula, flow 3 L/min     Functional Mobility:  Bed Mobility:     Supine to Sit: minimum assistance and for RLE  Sit to Supine: moderate assistance and for BLE  Transfers:     Sit to Stand:  moderate assistance and of 1 persons with rolling walker  Patient performed static standing balance at EOB with RW for approx. 20-30 seconds.      AM-PAC 6 CLICK MOBILITY  Turning over in bed (including adjusting bedclothes, sheets and blankets)?: 4  Sitting down on and standing up from a chair with arms (e.g., wheelchair, bedside commode, etc.): 2  Moving from lying on back to sitting on the side of the bed?: 3  Moving to and from a bed to a chair (including a wheelchair)?: 3  Need to walk in hospital room?: 1 (Pt reported that she did not think she could due to heel pain.)  Climbing 3-5 steps with a railing?: 1  Basic Mobility Total Score: 14       Treatment & Education:  Patient performed the following therapeutic exercises: SLRs 10x with Mod A for RLE, ankle pumps 20x with max A for L foot, glute sets 20x, Hip ADD squeezes 20x, LAQs 20x BLE    Patient left HOB elevated with all lines intact and call button in reach.    GOALS:   Multidisciplinary Problems       Physical Therapy Goals          Problem: Physical Therapy    Goal Priority Disciplines Outcome Goal Variances Interventions   Physical Therapy Goal     PT, PT/OT Ongoing, Progressing     Description: STG - 3 weeks  1. Pt min A x 1 with bed mob.  2. Pt min A x 1  with transfers using RW.  3. Pt will amb 25 ft with min A x 1 using RW.  4. Pt will have 3/5 RLE strength.  5. Pt will recall 3/3 SHYANNE precautions.    LTG - 6 weeks  1. Pt SBA with bed mob.  2. Pt SBA with transfers using RW.  3. Pt will amb 50 ft with SBA using RW.  4. Pt will have 4-/5 RLE strength.                        Time Tracking:     PT Received On: 02/17/23  PT Start Time: 1240     PT Stop Time: 1304  PT Total Time (min): 24 min     Billable Minutes: Therapeutic Activity 8 and Therapeutic Exercise 16    Treatment Type: Treatment  PT/PTA: PTA     PTA Visit Number: 1     02/17/2023

## 2023-02-18 LAB
GLUCOSE SERPL-MCNC: 147 MG/DL (ref 70–105)
GLUCOSE SERPL-MCNC: 154 MG/DL (ref 70–105)
GLUCOSE SERPL-MCNC: 183 MG/DL (ref 70–105)
GLUCOSE SERPL-MCNC: 216 MG/DL (ref 70–105)

## 2023-02-18 PROCEDURE — 25000003 PHARM REV CODE 250

## 2023-02-18 PROCEDURE — 25000003 PHARM REV CODE 250: Performed by: HOSPITALIST

## 2023-02-18 PROCEDURE — 63600175 PHARM REV CODE 636 W HCPCS

## 2023-02-18 PROCEDURE — 27000221 HC OXYGEN, UP TO 24 HOURS

## 2023-02-18 PROCEDURE — 11000004 HC SNF PRIVATE

## 2023-02-18 PROCEDURE — 82962 GLUCOSE BLOOD TEST: CPT

## 2023-02-18 RX ADMIN — FUROSEMIDE 40 MG: 40 TABLET ORAL at 08:02

## 2023-02-18 RX ADMIN — OXYCODONE HYDROCHLORIDE 10 MG: 10 TABLET ORAL at 04:02

## 2023-02-18 RX ADMIN — ATORVASTATIN CALCIUM 40 MG: 40 TABLET, FILM COATED ORAL at 08:02

## 2023-02-18 RX ADMIN — MUPIROCIN: 20 OINTMENT TOPICAL at 08:02

## 2023-02-18 RX ADMIN — AMIODARONE HYDROCHLORIDE 200 MG: 200 TABLET ORAL at 08:02

## 2023-02-18 RX ADMIN — MICONAZOLE NITRATE 2 % TOPICAL POWDER: at 08:02

## 2023-02-18 RX ADMIN — ROPINIROLE 0.25 MG: 0.25 TABLET, FILM COATED ORAL at 08:02

## 2023-02-18 RX ADMIN — OXYCODONE HYDROCHLORIDE 10 MG: 10 TABLET ORAL at 01:02

## 2023-02-18 RX ADMIN — FAMOTIDINE 20 MG: 20 TABLET, FILM COATED ORAL at 08:02

## 2023-02-18 RX ADMIN — WARFARIN SODIUM 4 MG: 1 TABLET ORAL at 04:02

## 2023-02-18 RX ADMIN — OXYCODONE HYDROCHLORIDE 10 MG: 10 TABLET ORAL at 08:02

## 2023-02-18 RX ADMIN — LEVOTHYROXINE SODIUM 25 MCG: 0.03 TABLET ORAL at 06:02

## 2023-02-18 RX ADMIN — ZINC OXIDE TOPICAL OINT.: at 08:02

## 2023-02-18 RX ADMIN — PREDNISONE 5 MG: 5 TABLET ORAL at 08:02

## 2023-02-18 RX ADMIN — INSULIN ASPART 2 UNITS: 100 INJECTION, SOLUTION INTRAVENOUS; SUBCUTANEOUS at 06:02

## 2023-02-18 NOTE — PLAN OF CARE
Problem: Adult Inpatient Plan of Care  Goal: Optimal Comfort and Wellbeing  Outcome: Ongoing, Progressing   Patient's pain will be controlled by discharge.

## 2023-02-18 NOTE — NURSING
Nurses Note -- 4 Eyes      2/17/2023   10:13 PM      Skin assessed during: Q Shift Change      [] No Pressure Injuries Present    []Prevention Measures Documented      [] Yes- Altered Skin Integrity Present or Discovered   [] LDA Added if Not in Epic (Describe Wound)   [x] New Altered Skin Integrity was Present on Admit and Documented in LDA   [] Wound Image Taken    Wound Care Consulted? No    Attending Nurse:  Lisa Davidson RN     Second RN/Staff Member: Kaleigh Woods RN

## 2023-02-19 LAB
GLUCOSE SERPL-MCNC: 177 MG/DL (ref 70–105)
GLUCOSE SERPL-MCNC: 200 MG/DL (ref 70–105)
GLUCOSE SERPL-MCNC: 204 MG/DL (ref 70–105)
GLUCOSE SERPL-MCNC: 229 MG/DL (ref 70–105)

## 2023-02-19 PROCEDURE — 94761 N-INVAS EAR/PLS OXIMETRY MLT: CPT

## 2023-02-19 PROCEDURE — 25000003 PHARM REV CODE 250: Performed by: HOSPITALIST

## 2023-02-19 PROCEDURE — 27000221 HC OXYGEN, UP TO 24 HOURS

## 2023-02-19 PROCEDURE — 11000004 HC SNF PRIVATE

## 2023-02-19 PROCEDURE — 82962 GLUCOSE BLOOD TEST: CPT

## 2023-02-19 PROCEDURE — 63600175 PHARM REV CODE 636 W HCPCS

## 2023-02-19 PROCEDURE — 25000003 PHARM REV CODE 250

## 2023-02-19 RX ADMIN — MICONAZOLE NITRATE 2 % TOPICAL POWDER: at 08:02

## 2023-02-19 RX ADMIN — MELATONIN 6 MG: at 09:02

## 2023-02-19 RX ADMIN — OXYCODONE HYDROCHLORIDE 10 MG: 10 TABLET ORAL at 03:02

## 2023-02-19 RX ADMIN — OXYCODONE HYDROCHLORIDE 10 MG: 10 TABLET ORAL at 11:02

## 2023-02-19 RX ADMIN — ZINC OXIDE TOPICAL OINT.: at 08:02

## 2023-02-19 RX ADMIN — ROPINIROLE 0.25 MG: 0.25 TABLET, FILM COATED ORAL at 08:02

## 2023-02-19 RX ADMIN — PREDNISONE 5 MG: 5 TABLET ORAL at 08:02

## 2023-02-19 RX ADMIN — FAMOTIDINE 20 MG: 20 TABLET, FILM COATED ORAL at 09:02

## 2023-02-19 RX ADMIN — LEVOTHYROXINE SODIUM 25 MCG: 0.03 TABLET ORAL at 06:02

## 2023-02-19 RX ADMIN — INSULIN ASPART 2 UNITS: 100 INJECTION, SOLUTION INTRAVENOUS; SUBCUTANEOUS at 11:02

## 2023-02-19 RX ADMIN — INSULIN ASPART 1 UNITS: 100 INJECTION, SOLUTION INTRAVENOUS; SUBCUTANEOUS at 09:02

## 2023-02-19 RX ADMIN — AMIODARONE HYDROCHLORIDE 200 MG: 200 TABLET ORAL at 09:02

## 2023-02-19 RX ADMIN — FUROSEMIDE 40 MG: 40 TABLET ORAL at 08:02

## 2023-02-19 RX ADMIN — DIGOXIN 0.12 MG: 125 TABLET ORAL at 08:02

## 2023-02-19 RX ADMIN — MICONAZOLE NITRATE 2 % TOPICAL POWDER: at 09:02

## 2023-02-19 RX ADMIN — OXYCODONE HYDROCHLORIDE 10 MG: 10 TABLET ORAL at 01:02

## 2023-02-19 RX ADMIN — WARFARIN SODIUM 4 MG: 1 TABLET ORAL at 04:02

## 2023-02-19 RX ADMIN — FAMOTIDINE 20 MG: 20 TABLET, FILM COATED ORAL at 08:02

## 2023-02-19 RX ADMIN — AMIODARONE HYDROCHLORIDE 200 MG: 200 TABLET ORAL at 08:02

## 2023-02-19 RX ADMIN — MUPIROCIN: 20 OINTMENT TOPICAL at 08:02

## 2023-02-19 RX ADMIN — ROPINIROLE 0.25 MG: 0.25 TABLET, FILM COATED ORAL at 09:02

## 2023-02-19 RX ADMIN — ATORVASTATIN CALCIUM 40 MG: 40 TABLET, FILM COATED ORAL at 09:02

## 2023-02-19 NOTE — NURSING
Nurses Note -- 4 Eyes      2/19/2023   12:30 PM      Skin assessed during: Q Shift Change      [] No Pressure Injuries Present    [x]Prevention Measures Documented      [x] Yes- Altered Skin Integrity Present or Discovered   [] LDA Added if Not in Epic (Describe Wound)   [] New Altered Skin Integrity was Present on Admit and Documented in LDA   [] Wound Image Taken    Wound Care Consulted? No    Attending Nurse:  Claudean M Mitchell, LPN     Second RN/Staff Member:  Jose Valdez RN       Pt is already getting wound care and dressing changes.

## 2023-02-19 NOTE — PLAN OF CARE
Problem: Adult Inpatient Plan of Care  Goal: Optimal Comfort and Wellbeing  2/19/2023 0531 by Melody Burden RN  Outcome: Ongoing, Progressing

## 2023-02-20 PROBLEM — M21.371 FOOT DROP, RIGHT FOOT: Status: ACTIVE | Noted: 2023-02-20

## 2023-02-20 LAB
GLUCOSE SERPL-MCNC: 128 MG/DL (ref 70–105)
GLUCOSE SERPL-MCNC: 146 MG/DL (ref 70–105)
GLUCOSE SERPL-MCNC: 246 MG/DL (ref 70–105)
GLUCOSE SERPL-MCNC: 307 MG/DL (ref 70–105)

## 2023-02-20 PROCEDURE — 27000221 HC OXYGEN, UP TO 24 HOURS

## 2023-02-20 PROCEDURE — 99900035 HC TECH TIME PER 15 MIN (STAT)

## 2023-02-20 PROCEDURE — 11000004 HC SNF PRIVATE

## 2023-02-20 PROCEDURE — 97110 THERAPEUTIC EXERCISES: CPT

## 2023-02-20 PROCEDURE — 82962 GLUCOSE BLOOD TEST: CPT

## 2023-02-20 PROCEDURE — 94761 N-INVAS EAR/PLS OXIMETRY MLT: CPT

## 2023-02-20 PROCEDURE — 25000003 PHARM REV CODE 250: Performed by: HOSPITALIST

## 2023-02-20 PROCEDURE — 99308 SBSQ NF CARE LOW MDM 20: CPT | Mod: ,,, | Performed by: HOSPITALIST

## 2023-02-20 PROCEDURE — 25000003 PHARM REV CODE 250

## 2023-02-20 PROCEDURE — 63600175 PHARM REV CODE 636 W HCPCS

## 2023-02-20 PROCEDURE — 99308 PR NURSING FAC CARE, SUBSEQ, MINOR COMPLIC: ICD-10-PCS | Mod: ,,, | Performed by: HOSPITALIST

## 2023-02-20 RX ORDER — FAMOTIDINE 20 MG/1
20 TABLET, FILM COATED ORAL DAILY
Status: DISCONTINUED | OUTPATIENT
Start: 2023-02-21 | End: 2023-03-10 | Stop reason: HOSPADM

## 2023-02-20 RX ADMIN — LEVOTHYROXINE SODIUM 25 MCG: 0.03 TABLET ORAL at 05:02

## 2023-02-20 RX ADMIN — MICONAZOLE NITRATE 2 % TOPICAL POWDER: at 08:02

## 2023-02-20 RX ADMIN — PREDNISONE 5 MG: 5 TABLET ORAL at 08:02

## 2023-02-20 RX ADMIN — AMIODARONE HYDROCHLORIDE 200 MG: 200 TABLET ORAL at 08:02

## 2023-02-20 RX ADMIN — SENNOSIDES AND DOCUSATE SODIUM 1 TABLET: 8.6; 5 TABLET ORAL at 08:02

## 2023-02-20 RX ADMIN — OXYCODONE HYDROCHLORIDE 10 MG: 10 TABLET ORAL at 08:02

## 2023-02-20 RX ADMIN — INSULIN ASPART 2 UNITS: 100 INJECTION, SOLUTION INTRAVENOUS; SUBCUTANEOUS at 05:02

## 2023-02-20 RX ADMIN — MUPIROCIN: 20 OINTMENT TOPICAL at 08:02

## 2023-02-20 RX ADMIN — DIGOXIN 0.12 MG: 125 TABLET ORAL at 08:02

## 2023-02-20 RX ADMIN — ROPINIROLE 0.25 MG: 0.25 TABLET, FILM COATED ORAL at 08:02

## 2023-02-20 RX ADMIN — ATORVASTATIN CALCIUM 40 MG: 40 TABLET, FILM COATED ORAL at 09:02

## 2023-02-20 RX ADMIN — MAGNESIUM HYDROXIDE 2400 MG: 1200 LIQUID ORAL at 08:02

## 2023-02-20 RX ADMIN — ZINC OXIDE TOPICAL OINT.: at 08:02

## 2023-02-20 RX ADMIN — OXYCODONE HYDROCHLORIDE 10 MG: 10 TABLET ORAL at 05:02

## 2023-02-20 RX ADMIN — FAMOTIDINE 20 MG: 20 TABLET, FILM COATED ORAL at 08:02

## 2023-02-20 RX ADMIN — MICONAZOLE NITRATE 2 % TOPICAL POWDER: at 09:02

## 2023-02-20 RX ADMIN — INSULIN ASPART 4 UNITS: 100 INJECTION, SOLUTION INTRAVENOUS; SUBCUTANEOUS at 11:02

## 2023-02-20 RX ADMIN — ROPINIROLE 0.25 MG: 0.25 TABLET, FILM COATED ORAL at 09:02

## 2023-02-20 NOTE — SUBJECTIVE & OBJECTIVE
Interval History: decreased dorsiflexion in R foot has developed    Review of Systems   Respiratory:  Negative for shortness of breath.    Cardiovascular:  Negative for chest pain.   Gastrointestinal:  Negative for abdominal pain, nausea and vomiting.   Neurological:  Positive for weakness.   Psychiatric/Behavioral:  Negative for agitation and confusion.    All other systems reviewed and are negative.  Objective:     Vital Signs (Most Recent):  Temp: 98.3 °F (36.8 °C) (02/20/23 0744)  Pulse: 67 (02/20/23 0811)  Resp: 20 (02/20/23 0811)  BP: (!) 117/48 (02/20/23 0744)  SpO2: 96 % (02/20/23 0811)   Vital Signs (24h Range):  Temp:  [98 °F (36.7 °C)-98.3 °F (36.8 °C)] 98.3 °F (36.8 °C)  Pulse:  [64-71] 67  Resp:  [17-20] 20  SpO2:  [96 %-98 %] 96 %  BP: (117-152)/(42-48) 117/48     Weight: 66.2 kg (145 lb 15.1 oz)  Body mass index is 22.86 kg/m².    Intake/Output Summary (Last 24 hours) at 2/20/2023 1348  Last data filed at 2/19/2023 1715  Gross per 24 hour   Intake 357 ml   Output --   Net 357 ml      Physical Exam  Vitals reviewed.   Constitutional:       General: She is not in acute distress.     Appearance: Normal appearance.   HENT:      Head: Normocephalic and atraumatic.   Eyes:      General: No scleral icterus.     Extraocular Movements: Extraocular movements intact.      Conjunctiva/sclera: Conjunctivae normal.      Pupils: Pupils are equal, round, and reactive to light.   Cardiovascular:      Rate and Rhythm: Normal rate and regular rhythm.      Heart sounds: No murmur heard.    No friction rub. No gallop.   Pulmonary:      Effort: Pulmonary effort is normal. No respiratory distress.      Breath sounds: Normal breath sounds. No wheezing or rales.   Abdominal:      General: Abdomen is flat. Bowel sounds are normal. There is no distension.      Palpations: Abdomen is soft.      Tenderness: There is no abdominal tenderness. There is no guarding.   Musculoskeletal:         General: No swelling.      Comments: Hip  pain ok    Skin:     General: Skin is warm and dry.      Coloration: Skin is not jaundiced.      Findings: No rash.   Neurological:      General: No focal deficit present.      Mental Status: She is alert and oriented to person, place, and time.      Sensory: No sensory deficit.      Motor: Weakness present.   Psychiatric:         Mood and Affect: Mood normal.         Behavior: Behavior normal.         Thought Content: Thought content normal.       Significant Labs: All pertinent labs within the past 24 hours have been reviewed.  Recent Lab Results         02/20/23  1114   02/20/23  0516   02/19/23  1934   02/19/23  1616        POC Glucose 307   128   204   177               Significant Imaging: I have reviewed all pertinent imaging results/findings within the past 24 hours.

## 2023-02-20 NOTE — ASSESSMENT & PLAN NOTE
She has extensive back issues and neurological issues, consider MRI.  Therapy working on Dorsiflexion.  May need boot.

## 2023-02-20 NOTE — PROGRESS NOTES
[facility]  Hospital Medicine  Progress Note    Patient Name: Karina Rojas  MRN: [id number]  Patient Class: IP- Swing   Admission Date: 2/10/2023  Length of Stay: 10 days  Attending Physician: Jorge Buckley DO  Primary Care Provider: Thalia Bowie NP        Subjective:     Principal Problem:Status post hip surgery        HPI:  Patient is a 82-year-old ill-appearing  female who is admitted to [facility] for skilled nursing services status post right intratrochanteric for mole nail removal and subsequent right total hip replacement with ORIF.  Patient is here for physical and occupational therapy services.  Patient's surgical procedure took place [facility] in [place] on February 9, 2023 by Dr. Teofilo Lewis.      Overview/Hospital Course:  2/13 2000 Called by RICARDO Galdamez LPN that Ms Rojas is having N/V and  BP 81/40.  Ms Rojas reports she has had intermittent NV since surg last week.  She is able to take sips of PO fluids but continues to have nausea.  Ms Rojas states her BP is always low 100s/40-50s.  She denies pain.  She denies diarrhea.  Will treat with 250 ml bolus, Zofran and continue to monitor.     2/15 Nausea seems better today, not vomiting but just nauseated.  Did eat lunch. Pain seems controlled.      2/20 Overall seems better, however, she has now developed R foot drop with decreased dorsiflexion.  Otherwise nausea seems better      Interval History: decreased dorsiflexion in R foot has developed    Review of Systems   Respiratory:  Negative for shortness of breath.    Cardiovascular:  Negative for chest pain.   Gastrointestinal:  Negative for abdominal pain, nausea and vomiting.   Neurological:  Positive for weakness.   Psychiatric/Behavioral:  Negative for agitation and confusion.    All other systems reviewed and are negative.  Objective:     Vital Signs (Most Recent):  Temp: 98.3 °F (36.8 °C) (02/20/23  0744)  Pulse: 67 (02/20/23 0811)  Resp: 20 (02/20/23 0811)  BP: (!) 117/48 (02/20/23 0744)  SpO2: 96 % (02/20/23 0811)   Vital Signs (24h Range):  Temp:  [98 °F (36.7 °C)-98.3 °F (36.8 °C)] 98.3 °F (36.8 °C)  Pulse:  [64-71] 67  Resp:  [17-20] 20  SpO2:  [96 %-98 %] 96 %  BP: (117-152)/(42-48) 117/48     Weight: 66.2 kg (145 lb 15.1 oz)  Body mass index is 22.86 kg/m².    Intake/Output Summary (Last 24 hours) at 2/20/2023 1348  Last data filed at 2/19/2023 1715  Gross per 24 hour   Intake 357 ml   Output --   Net 357 ml      Physical Exam  Vitals reviewed.   Constitutional:       General: She is not in acute distress.     Appearance: Normal appearance.   HENT:      Head: Normocephalic and atraumatic.   Eyes:      General: No scleral icterus.     Extraocular Movements: Extraocular movements intact.      Conjunctiva/sclera: Conjunctivae normal.      Pupils: Pupils are equal, round, and reactive to light.   Cardiovascular:      Rate and Rhythm: Normal rate and regular rhythm.      Heart sounds: No murmur heard.    No friction rub. No gallop.   Pulmonary:      Effort: Pulmonary effort is normal. No respiratory distress.      Breath sounds: Normal breath sounds. No wheezing or rales.   Abdominal:      General: Abdomen is flat. Bowel sounds are normal. There is no distension.      Palpations: Abdomen is soft.      Tenderness: There is no abdominal tenderness. There is no guarding.   Musculoskeletal:         General: No swelling.      Comments: Hip pain ok    Skin:     General: Skin is warm and dry.      Coloration: Skin is not jaundiced.      Findings: No rash.   Neurological:      General: No focal deficit present.      Mental Status: She is alert and oriented to person, place, and time.      Sensory: No sensory deficit.      Motor: Weakness present.   Psychiatric:         Mood and Affect: Mood normal.         Behavior: Behavior normal.         Thought Content: Thought content normal.       Significant Labs: All  pertinent labs within the past 24 hours have been reviewed.  Recent Lab Results         02/20/23  1114   02/20/23  0516   02/19/23  1934   02/19/23  1616        POC Glucose 307   128   204   177               Significant Imaging: I have reviewed all pertinent imaging results/findings within the past 24 hours.      Assessment/Plan:      * Status post hip surgery  Monitor pain and progress with therapy       Foot drop, right foot    She has extensive back issues and neurological issues, consider MRI.  Therapy working on Dorsiflexion.  May need boot.     Nausea  Better with zofran.  Monitor PO intake.       Chronic pain syndrome  Monitor while on pain meds.  Will not escalate at this time.       Diabetes  Patient's FSGs are controlled on current medication regimen.  Last A1c reviewed-   Lab Results   Component Value Date    HGBA1C 6.7 (H) 02/06/2023     Most recent fingerstick glucose reviewed- No results for input(s): POCTGLUCOSE in the last 24 hours.  Current correctional scale  Low  Maintain anti-hyperglycemic dose as follows-   Antihyperglycemics (From admission, onward)    Start     Stop Route Frequency Ordered    02/11/23 0745  metFORMIN tablet 1,000 mg         -- Oral With breakfast 02/10/23 1317    02/10/23 1514  insulin aspart U-100 injection 0-5 Units         -- SubQ Before meals & nightly PRN 02/10/23 1415        Hold Oral hypoglycemics while patient is in the hospital.    Atrial fibrillation  Patient with Long standing persistent (>12 months) atrial fibrillation which is controlled currently with Beta Blocker. Patient is currently in sinus rhythm.[id number] Score: 4. HASBLED Score: . Anticoagulation         HTN (hypertension)  Continue home meds and adjust as needed.         VTE Risk Mitigation (From admission, onward)         Ordered     warfarin (COUMADIN) tablet 4 mg  Every Tues, Thurs, Sat, Sun         02/10/23 1317     IP VTE HIGH RISK PATIENT  Once         02/10/23 1311     Place sequential  compression device  Until discontinued         02/10/23 1311                Discharge Planning   CHANTAL:      Code Status: Full Code   Is the patient medically ready for discharge?:     Reason for patient still in hospital (select all that apply): Patient trending condition and PT / OT recommendations                     Jorge Buckley DO  Department of Hospital Medicine   [facility]

## 2023-02-20 NOTE — PROGRESS NOTES
Clinical Pharmacy Chart Review Note      Admit Date: 2/10/2023   LOS: 10 days       Karina Rojas is a 82 y.o. female admitted to SNF for PT/OT after hospitalization for right total hip replacement.     Active Hospital Problems    Diagnosis  POA    *Status post hip surgery [Z98.890]  Not Applicable    Foot drop, right foot [M21.371]  No            Nausea [R11.0]  No    Chronic pain syndrome [G89.4]  Yes    Atrial fibrillation [I48.91]  Yes    Diabetes [E11.9]  Yes    HTN (hypertension) [I10]  Yes      Resolved Hospital Problems   No resolved problems to display.     Review of patient's allergies indicates:   Allergen Reactions    Tylenol [acetaminophen] Anaphylaxis and Shortness Of Breath     Patient Active Problem List    Diagnosis Date Noted    Foot drop, right foot 02/20/2023    Nausea 02/15/2023    Status post hip surgery 02/13/2023    Chronic pain syndrome 02/13/2023    HTN (hypertension) 01/04/2023    Atrial fibrillation 01/04/2023    Diabetes 01/04/2023       Scheduled Meds:    amiodarone  200 mg Oral BID    atorvastatin  40 mg Oral QHS    digoxin  0.125 mg Oral Daily    [START ON 2/21/2023] famotidine  20 mg Oral Daily    furosemide  40 mg Oral Daily    levothyroxine  25 mcg Oral Before breakfast    miconazole NITRATE 2 %   Topical (Top) BID    mupirocin   Topical (Top) Daily    predniSONE  5 mg Oral Daily    rOPINIRole  0.25 mg Oral BID    warfarin  4 mg Oral Every Tues, Thurs, Sat, Sun    zinc oxide   Topical (Top) Daily     Continuous Infusions:   PRN Meds: calcium carbonate, dextrose 50%, dextrose 50%, glucagon (human recombinant), glucose, glucose, insulin aspart U-100, magnesium hydroxide 400 mg/5 ml, melatonin, ondansetron, oxyCODONE, prochlorperazine, senna-docusate 8.6-50 mg    OBJECTIVE:     Vital Signs (Last 24H)  Temp:  [98 °F (36.7 °C)-98.3 °F (36.8 °C)]   Pulse:  [64-71]   Resp:  [17-20]   BP: (117-152)/(42-48)   SpO2:  [96 %-98 %]     Laboratory:  CBC:   Recent Labs   Lab 02/17/23  1311    WBC 13.01*   RBC 3.40*   HGB 9.4*   HCT 32.2*   *   MCV 94.7   MCH 27.6   MCHC 29.2*     BMP:   Recent Labs   Lab 02/17/23  1311   *      K 4.4   CL 94*   CO2 38*   BUN 18   CREATININE 0.97   CALCIUM 8.9         ASSESSMENT/PLAN:     Estimated Creatinine Clearance: 45 mL/min (based on SCr of 0.97 mg/dL).  Medications reviewed. Famotidine frequency adjusted based on crcl. Weekly swing bed medication regiment review completed. Will continue to  monitor.

## 2023-02-20 NOTE — PROGRESS NOTES
Pt requested to hold off on afternoon PT treatment due to constipation. Will follow up tomorrow.

## 2023-02-20 NOTE — PT/OT/SLP PROGRESS
Physical Therapy Treatment    Patient Name:  Karina Rojas   MRN:  [id number]    Recommendations:     Discharge Recommendations: home with home health  Discharge Equipment Recommendations: none  Barriers to discharge: Inaccessible home and Decreased caregiver support    Assessment:     Karina Rojas is a 82 y.o. female admitted with a medical diagnosis of Status post hip surgery.  She presents with the following impairments/functional limitations: pain, weakness, impaired endurance, gait instability, impaired balance, decreased lower extremity function, orthopedic precautions, other (comment) (left foot drop) .    Pt cont't to have left foot drop, but does have some abd/add of toes 2-4. Foot drop significantly impacting ability to amb.    Rehab Prognosis: Fair; patient would benefit from acute skilled PT services to address these deficits and reach maximum level of function.    Recent Surgery: * No surgery found *      Plan:     During this hospitalization, patient to be seen BID to address the identified rehab impairments via gait training, therapeutic activities, therapeutic exercises and progress toward the following goals:    Plan of Care Expires:  03/24/23    Subjective     Chief Complaint: Pt states she is now able to transfer supine to sit on EOB without assist, but she is having the nurses lift RLE onto bed surface to avoid actively abducting right hip. Pt expressed FOF due to left foot drop.  Patient/Family Comments/goals: Pt to dc home with home health, when able. Pt is being scheduled for vasular appt.  Pain/Comfort:  Pain Rating 1: 5/10  Location - Side 1: Bilateral  Location - Orientation 1: midline  Location 1: back  Pain Addressed 1: Pre-medicate for activity  Pain Rating Post-Intervention 1: 5/10      Objective:     Communicated with pt prior to session.  Patient found HOB elevated with oxygen upon PT entry to room.     General Precautions: Standard, fall  Orthopedic Precautions: RLE weight  "bearing as tolerated, RLE posterior precautions (no AROM right hip abd.)  Braces:    Respiratory Status: Room air     Functional Mobility:  Bed Mobility:     Rolling Left:  supervision  Scooting: stand by assistance  Supine to Sit: stand by assistance  Sit to Supine: minimum assistance  Transfers:     Sit to Stand:  minimum assistance with rolling walker  Gait: Pt amb'd laterally left along EOB using RW/O2 x 2 ft with min Ax1, but then stated she had to sit down due to FOF from left foot drop.She also stated that she was scare her left knee would buckle.      AM-PAC 6 CLICK MOBILITY  Turning over in bed (including adjusting bedclothes, sheets and blankets)?: 4  Sitting down on and standing up from a chair with arms (e.g., wheelchair, bedside commode, etc.): 3  Moving from lying on back to sitting on the side of the bed?: 3  Moving to and from a bed to a chair (including a wheelchair)?: 3  Need to walk in hospital room?: 3  Climbing 3-5 steps with a railing?: 1  Basic Mobility Total Score: 17       Treatment & Education:  Pt recalled 3/4 hip precautions (she forgets about not IR hip, but does remember to not actively abduct the hip). Bed ex's x 20 reps RLE: AP, passive stretching left calf 2x30", hip ER/IR to neutral, bridging, supine marching, hip ER with knees flexed. Pt transferred supine to sit with SBA, but needed min VC to avoid IR right hip. Pt came to  front of bed and was able to amb 2 ft to the left with RW with Angelito, but then she suddenly sat down on the bed without warning because she stated she was afraid of left knee buckling. PT assisted pt with transfer sit to supine and 2 pillows placed from posterior knee to distal calf with heels floating in air.    Patient left HOB elevated with all lines intact and Dr. Buckley present..PT discussed with Dr. Buckley that exactly one week ago today the pt was able to actively perform left ankle DF.    GOALS:   Multidisciplinary Problems       Physical " Therapy Goals          Problem: Physical Therapy    Goal Priority Disciplines Outcome Goal Variances Interventions   Physical Therapy Goal     PT, PT/OT Ongoing, Progressing     Description: STG - 3 weeks  1. Pt min A x 1 with bed mob.-MET  2. Pt min A x 1  with transfers using RW.-MET  3. Pt will amb 25 ft with min A x 1 using RW.-PROGRESSING  4. Pt will have 3/5 RLE strength.-PROGRESSING  5. Pt will recall 3/3 SHYANNE precautions.-NOT MET    LTG - 6 weeks  1. Pt SBA with bed mob.  2. Pt SBA with transfers using RW.  3. Pt will amb 50 ft with SBA using RW.  4. Pt will have 4-/5 RLE strength.                       Time Tracking:     PT Received On: 02/20/23  PT Start Time: 0952     PT Stop Time: 1010  PT Total Time (min): 18 min     Billable Minutes: Gait Training 2, Therapeutic Activity 5, Therapeutic Exercise 11, and Total Time 18 min    Treatment Type: Treatment  PT/PTA: PT     PTA Visit Number: 0     02/20/2023

## 2023-02-21 LAB
GLUCOSE SERPL-MCNC: 141 MG/DL (ref 70–105)
GLUCOSE SERPL-MCNC: 150 MG/DL (ref 70–105)
GLUCOSE SERPL-MCNC: 217 MG/DL (ref 70–105)
GLUCOSE SERPL-MCNC: 223 MG/DL (ref 70–105)

## 2023-02-21 PROCEDURE — 27000221 HC OXYGEN, UP TO 24 HOURS

## 2023-02-21 PROCEDURE — 25000003 PHARM REV CODE 250

## 2023-02-21 PROCEDURE — 82962 GLUCOSE BLOOD TEST: CPT

## 2023-02-21 PROCEDURE — 97110 THERAPEUTIC EXERCISES: CPT | Mod: CQ

## 2023-02-21 PROCEDURE — 11000004 HC SNF PRIVATE

## 2023-02-21 PROCEDURE — 97530 THERAPEUTIC ACTIVITIES: CPT | Mod: CQ

## 2023-02-21 PROCEDURE — 63600175 PHARM REV CODE 636 W HCPCS

## 2023-02-21 PROCEDURE — 25000003 PHARM REV CODE 250: Performed by: HOSPITALIST

## 2023-02-21 RX ADMIN — ZINC OXIDE TOPICAL OINT.: at 08:02

## 2023-02-21 RX ADMIN — MAGNESIUM HYDROXIDE 2400 MG: 1200 LIQUID ORAL at 08:02

## 2023-02-21 RX ADMIN — OXYCODONE HYDROCHLORIDE 10 MG: 10 TABLET ORAL at 08:02

## 2023-02-21 RX ADMIN — MICONAZOLE NITRATE 2 % TOPICAL POWDER: at 08:02

## 2023-02-21 RX ADMIN — FAMOTIDINE 20 MG: 20 TABLET, FILM COATED ORAL at 08:02

## 2023-02-21 RX ADMIN — MELATONIN 6 MG: at 08:02

## 2023-02-21 RX ADMIN — SENNOSIDES AND DOCUSATE SODIUM 1 TABLET: 8.6; 5 TABLET ORAL at 08:02

## 2023-02-21 RX ADMIN — ATORVASTATIN CALCIUM 40 MG: 40 TABLET, FILM COATED ORAL at 08:02

## 2023-02-21 RX ADMIN — INSULIN ASPART 1 UNITS: 100 INJECTION, SOLUTION INTRAVENOUS; SUBCUTANEOUS at 09:02

## 2023-02-21 RX ADMIN — AMIODARONE HYDROCHLORIDE 200 MG: 200 TABLET ORAL at 08:02

## 2023-02-21 RX ADMIN — ROPINIROLE 0.25 MG: 0.25 TABLET, FILM COATED ORAL at 08:02

## 2023-02-21 RX ADMIN — LEVOTHYROXINE SODIUM 25 MCG: 0.03 TABLET ORAL at 05:02

## 2023-02-21 RX ADMIN — WARFARIN SODIUM 4 MG: 1 TABLET ORAL at 05:02

## 2023-02-21 RX ADMIN — OXYCODONE HYDROCHLORIDE 10 MG: 10 TABLET ORAL at 11:02

## 2023-02-21 RX ADMIN — INSULIN ASPART 2 UNITS: 100 INJECTION, SOLUTION INTRAVENOUS; SUBCUTANEOUS at 05:02

## 2023-02-21 RX ADMIN — MUPIROCIN: 20 OINTMENT TOPICAL at 08:02

## 2023-02-21 RX ADMIN — FUROSEMIDE 40 MG: 40 TABLET ORAL at 08:02

## 2023-02-21 RX ADMIN — PREDNISONE 5 MG: 5 TABLET ORAL at 08:02

## 2023-02-21 RX ADMIN — OXYCODONE HYDROCHLORIDE 10 MG: 10 TABLET ORAL at 03:02

## 2023-02-21 NOTE — PLAN OF CARE
Problem: Adult Inpatient Plan of Care  Goal: Absence of Hospital-Acquired Illness or Injury  Outcome: Ongoing, Progressing     Problem: Fall Injury Risk  Goal: Absence of Fall and Fall-Related Injury  Outcome: Ongoing, Progressing     Problem: Skin Injury Risk Increased  Goal: Skin Health and Integrity  Outcome: Ongoing, Progressing

## 2023-02-21 NOTE — PROGRESS NOTES
"[facility] - Medical Surgical Unit  Adult Nutrition  First Assessment Note         Reason for Assessment  Reason For Assessment: consult (Assess/educate regarding nutritional needs)  Nutrition Risk Screen: no indicators present    RD follow up note.     Recommend continue current Diabetic diet order as medically appropriate and tolerated. Recommend continue Glucerna oral nutrition supplement TID to promote adequate oral intake. Encourage good PO intakes.    Per MD note:  "HPI: Patient is a 82-year-old ill-appearing  female who is admitted to [facility] for skilled nursing services status post right intratrochanteric for mole nail removal and subsequent right total hip replacement with ORIF.  Patient is here for physical and occupational therapy services.  Patient's surgical procedure took place [facility] in [place] on February 9, 2023 by Dr. Teofilo Lewis."    Overview/Hospital Course:  2/13 2000 Called by RICARDO Galdamez LPN that Ms Rojas is having N/V and  BP 81/40.  Ms Rojas reports she has had intermittent NV since surg last week.  She is able to take sips of PO fluids but continues to have nausea.  Ms Rojas states her BP is always low 100s/40-50s.  She denies pain.  She denies diarrhea.  Will treat with 250 ml bolus, Zofran and continue to monitor.      2/15 Nausea seems better today, not vomiting but just nauseated.  Did eat lunch. Pain seems controlled.       2/20 Overall seems better, however, she has now developed R foot drop with decreased dorsiflexion.  Otherwise nausea seems better        Patient currently receiving Diabetic Diet with 25% PO intake documented. PO intake inadequate to meet nutritional needs. She is receiving Glucerna with meals and accepting them @ %.     Recommend continue current diet order as medically appropriate and tolerated.    Glucerna provides 220kcal, 10g protein each. Encourage good PO intakes.    Patient CBW and " BMI are within IBW range and WNL. Will continue to monitor weight trends.     Last BM 2/20 per flowsheets - pt receiving senna-docusate PRN per med list. Will continue to monitor PO intakes, weight trend, meds, labs, updates in patient condition. RD following.    Malnutrition  Is Patient Malnourished: No    Nutrition Diagnosis  Altered Nutrition Related Lab Values   related to Diabetes Mellitus as evidenced by pt with elevated GLU    Nutrition Diagnosis Status: Chronic/ continues    Nutrition Risk  Level of Risk/Frequency of Follow-up: moderate - high   Chewing or Swallowing Difficulty?: No Chewing or swallowing difficulty    Estimated/Assessed Needs    Temp: 98.1 °F (36.7 °C)Oral  Weight Used For Calorie Calculations: 66.2 kg (145 lb 15.1 oz)   Energy Need Method: Kcal/kg (25-30kcal/kg) Energy Calorie Requirements (kcal): 1655-1986kcal  Weight Used For Protein Calculations: 66.2 kg (145 lb 15.1 oz)  Protein Requirements: 66-80g pro (1.0-1.2g pro/kg)       RDA Method (mL): 1655     Nutrition Prescription / Recommendations  Recommendation/Intervention: Recommend continue current Diabetic diet order as medically appropriate and tolerated. Recommend adding Glucerna oral nutrition supplement TID to promote adequate oral intake. Encourage good PO intakes.  Goals: PO intakes improve, weight maintenance  Nutrition Goal Status: new  Current Diet Order: Diabetic Diet  Nutrition Order Comments: Appropriate  Recommended Diet: Consistent Carbohydrate 1800 (60g Carbs)  Recommended Oral Supplement: Glucerna Shake [220 kcals, 10g Protein, 26g Carbs(4g Fiber, 7g Sugar), 9g Fat] three times daily  Is Nutrition Support Recommended: No  Is Education Recommended: No    Monitor and Evaluation  % current Intake: Other: PO intake 0-50%  % intake to meet estimated needs: 75 - 100 %  Food and Nutrient Intake: food and beverage intake, energy intake  Food and Nutrient Adminstration: diet order  Anthropometric Measurements: weight, weight  "change, body mass index  Biochemical Data, Medical Tests and Procedures: electrolyte and renal panel, gastrointestinal profile, glucose/endocrine profile, inflammatory profile, lipid profile  Nutrition-Focused Physical Findings: overall appearance, extremities, muscles and bones, head and eyes, skin     Current Medical Diagnosis and Past Medical History  Diagnosis: other (see comments) (s/p hip surgery)  Past Medical History:   Diagnosis Date    Carotid artery stenosis     Chronic respiratory failure with hypoxia     COPD (chronic obstructive pulmonary disease)     Diabetes mellitus     DVT (deep venous thrombosis)     Heart failure     High cholesterol     Hypertension     Hypothyroidism     Paroxysmal atrial fibrillation      Nutrition/Diet History  Spiritual, Cultural Beliefs, Hinduism Practices, Values that Affect Care: no  Food Allergies: NKFA  Factors Affecting Nutritional Intake: nausea/vomiting    Lab/Procedures/Meds  No results for input(s): NA, K, BUN, CREATININE, GLU, CALCIUM, ALBUMIN, CL, ALT, AST, PHOS in the last 72 hours.  Last A1c:   Lab Results   Component Value Date    HGBA1C 6.7 (H) 02/06/2023     Lab Results   Component Value Date    RBC 3.40 (L) 02/17/2023    HGB 9.4 (L) 02/17/2023    HCT 32.2 (L) 02/17/2023    MCV 94.7 02/17/2023    MCH 27.6 02/17/2023    MCHC 29.2 (L) 02/17/2023     Pertinent Labs Reviewed: reviewed  Na 133 (L) - replete to WNL as needed; CO2 34 (H) - pt with PMH of COPD; Anion gap 5 (L),  (H) - pt with DM  Pertinent Medications Reviewed: reviewed  Amiodarone, atorvastatin, digoxin, famotidine, furosemide, levothyroxine, ropinirole, coumadin, oxycodone PRN, senna-docusate PRN    Anthropometrics  Temp: 98.1 °F (36.7 °C)  Height Method: Estimated  Height: 5' 7" (170.2 cm)  Height (inches): 67 in  Weight Method: Standard Scale  Weight: 66.2 kg (145 lb 15.1 oz)  Weight (lb): 145.95 lb  Ideal Body Weight (IBW), Female: 135 lb  % Ideal Body Weight, Female (lb): 108.11 %  BMI " (Calculated): 22.9     Nutrition by Nursing  Diet/Nutrition Received: consistent carb/diabetic diet  Intake (%): 25%  Diet/Feeding Assistance: tray set-up  Diet/Feeding Tolerance: poor  Last Bowel Movement: 02/20/23    Nutrition Follow-Up  RD Follow-up?: Yes

## 2023-02-21 NOTE — PT/OT/SLP PROGRESS
Physical Therapy Treatment    Patient Name:  Karina Rojas   MRN:  [id number]    Recommendations:     Discharge Recommendations: home with home health  Discharge Equipment Recommendations: none  Barriers to discharge: Inaccessible home and Decreased caregiver support    Assessment:     Karina Rojas is a 82 y.o. female admitted with a medical diagnosis of Status post hip surgery.  She presents with the following impairments/functional limitations: weakness, impaired endurance, gait instability, impaired balance, impaired functional mobility, decreased lower extremity function, pain, orthopedic precautions. Patient continues to have L foot drop, which continues to limit ambulation. Patient required occasional rest breaks due to noted fatigue and SOB. After moving from supine to sitting EOB, patient's bilateral feet were a dark red/purple color after only sitting up for 1-2 minutes. SHAHRZAD Farris was notified; she examined patient's feet and stated that we could continue with treatment. Patient with no reports of adverse effects after completion of treatment.     Rehab Prognosis: Fair; patient would benefit from acute skilled PT services to address these deficits and reach maximum level of function.    Recent Surgery: * No surgery found *      Plan:     During this hospitalization, patient to be seen BID to address the identified rehab impairments via gait training, therapeutic activities, therapeutic exercises and progress toward the following goals:    Plan of Care Expires:  03/24/23    Subjective     Chief Complaint: back pain  Patient/Family Comments/goals: Pt to dc home with home health, when able.   Pain/Comfort:  Pain Rating 1: 7/10  Location - Side 1: Bilateral  Location - Orientation 1: generalized  Location 1: back  Pain Addressed 1: Pre-medicate for activity, Reposition      Objective:     Communicated with pt prior to session. Patient found HOB elevated with oxygen upon PT entry to room.     General  Precautions: Standard, fall  Orthopedic Precautions: RLE weight bearing as tolerated, RLE posterior precautions (No active R hip ABD)  Braces:    Respiratory Status: Nasal cannula, flow 3 L/min     Functional Mobility:  Bed Mobility:     Supine to Sit: stand by assistance  Sit to Supine: moderate assistance and for BLE  Transfers:     Sit to Stand:  minimum assistance with rolling walker  Gait: Pt took two lateral step to the left and two lateral steps to the right at EOB with rw and min A  Balance: Pt performed static standing balance at EOB with rw for approx. 30 seconds.      AM-PAC 6 CLICK MOBILITY  Turning over in bed (including adjusting bedclothes, sheets and blankets)?: 4  Sitting down on and standing up from a chair with arms (e.g., wheelchair, bedside commode, etc.): 3  Moving from lying on back to sitting on the side of the bed?: 4  Moving to and from a bed to a chair (including a wheelchair)?: 2  Need to walk in hospital room?: 2  Climbing 3-5 steps with a railing?: 1  Basic Mobility Total Score: 16       Treatment & Education:  LAQs 20x BLE, Hip ADD squeezes 20x, glute sets 20x, ankle pumps 20x with max A on L foot, SLRs 5x on RLE 10x on LLE    Patient left HOB elevated with all lines intact and call button in reach.    GOALS:   Multidisciplinary Problems       Physical Therapy Goals          Problem: Physical Therapy    Goal Priority Disciplines Outcome Goal Variances Interventions   Physical Therapy Goal     PT, PT/OT Ongoing, Progressing     Description: STG - 3 weeks  1. Pt min A x 1 with bed mob.-MET  2. Pt min A x 1  with transfers using RW.-MET  3. Pt will amb 25 ft with min A x 1 using RW.-PROGRESSING  4. Pt will have 3/5 RLE strength.-PROGRESSING  5. Pt will recall 3/3 SHYANNE precautions.-NOT MET    LTG - 6 weeks  1. Pt SBA with bed mob.  2. Pt SBA with transfers using RW.  3. Pt will amb 50 ft with SBA using RW.  4. Pt will have 4-/5 RLE strength.                       Time Tracking:     PT  Received On: 02/21/23  PT Start Time: 1430     PT Stop Time: 1506  PT Total Time (min): 36 min     Billable Minutes: Gait Training 1, Therapeutic Activity 8, and Therapeutic Exercise 27    Treatment Type: Treatment  PT/PTA: PTA     PTA Visit Number: 1     02/21/2023

## 2023-02-22 LAB
GLUCOSE SERPL-MCNC: 172 MG/DL (ref 70–105)
GLUCOSE SERPL-MCNC: 197 MG/DL (ref 70–105)
GLUCOSE SERPL-MCNC: 217 MG/DL (ref 70–105)
GLUCOSE SERPL-MCNC: 226 MG/DL (ref 70–105)

## 2023-02-22 PROCEDURE — 97110 THERAPEUTIC EXERCISES: CPT

## 2023-02-22 PROCEDURE — 97116 GAIT TRAINING THERAPY: CPT

## 2023-02-22 PROCEDURE — 99900035 HC TECH TIME PER 15 MIN (STAT)

## 2023-02-22 PROCEDURE — 27000221 HC OXYGEN, UP TO 24 HOURS

## 2023-02-22 PROCEDURE — 25000003 PHARM REV CODE 250: Performed by: HOSPITALIST

## 2023-02-22 PROCEDURE — 63600175 PHARM REV CODE 636 W HCPCS

## 2023-02-22 PROCEDURE — 11000004 HC SNF PRIVATE

## 2023-02-22 PROCEDURE — 25000003 PHARM REV CODE 250

## 2023-02-22 PROCEDURE — 82962 GLUCOSE BLOOD TEST: CPT

## 2023-02-22 PROCEDURE — 97530 THERAPEUTIC ACTIVITIES: CPT | Mod: CQ

## 2023-02-22 RX ADMIN — OXYCODONE HYDROCHLORIDE 10 MG: 10 TABLET ORAL at 08:02

## 2023-02-22 RX ADMIN — MAGNESIUM HYDROXIDE 2400 MG: 1200 LIQUID ORAL at 08:02

## 2023-02-22 RX ADMIN — ZINC OXIDE TOPICAL OINT.: at 08:02

## 2023-02-22 RX ADMIN — INSULIN ASPART 2 UNITS: 100 INJECTION, SOLUTION INTRAVENOUS; SUBCUTANEOUS at 11:02

## 2023-02-22 RX ADMIN — ROPINIROLE 0.25 MG: 0.25 TABLET, FILM COATED ORAL at 08:02

## 2023-02-22 RX ADMIN — MUPIROCIN: 20 OINTMENT TOPICAL at 08:02

## 2023-02-22 RX ADMIN — LEVOTHYROXINE SODIUM 25 MCG: 0.03 TABLET ORAL at 05:02

## 2023-02-22 RX ADMIN — ROPINIROLE 0.25 MG: 0.25 TABLET, FILM COATED ORAL at 09:02

## 2023-02-22 RX ADMIN — AMIODARONE HYDROCHLORIDE 200 MG: 200 TABLET ORAL at 08:02

## 2023-02-22 RX ADMIN — ONDANSETRON 4 MG: 4 TABLET, ORALLY DISINTEGRATING ORAL at 11:02

## 2023-02-22 RX ADMIN — SENNOSIDES AND DOCUSATE SODIUM 1 TABLET: 8.6; 5 TABLET ORAL at 08:02

## 2023-02-22 RX ADMIN — FUROSEMIDE 40 MG: 40 TABLET ORAL at 08:02

## 2023-02-22 RX ADMIN — ATORVASTATIN CALCIUM 40 MG: 40 TABLET, FILM COATED ORAL at 09:02

## 2023-02-22 RX ADMIN — MICONAZOLE NITRATE 2 % TOPICAL POWDER: at 08:02

## 2023-02-22 RX ADMIN — FAMOTIDINE 20 MG: 20 TABLET, FILM COATED ORAL at 08:02

## 2023-02-22 RX ADMIN — PREDNISONE 5 MG: 5 TABLET ORAL at 08:02

## 2023-02-22 RX ADMIN — MELATONIN 6 MG: at 09:02

## 2023-02-22 RX ADMIN — OXYCODONE HYDROCHLORIDE 10 MG: 10 TABLET ORAL at 02:02

## 2023-02-22 RX ADMIN — AMIODARONE HYDROCHLORIDE 200 MG: 200 TABLET ORAL at 09:02

## 2023-02-22 NOTE — PLAN OF CARE
Problem: Adult Inpatient Plan of Care  Goal: Optimal Comfort and Wellbeing  Outcome: Ongoing, Progressing     Problem: Fall Injury Risk  Goal: Absence of Fall and Fall-Related Injury  Outcome: Ongoing, Progressing     Problem: Impaired Wound Healing  Goal: Optimal Wound Healing  Outcome: Ongoing, Progressing

## 2023-02-22 NOTE — PT/OT/SLP PROGRESS
"Physical Therapy Treatment    Patient Name:  Karina Rojas   MRN:  [id number]    Recommendations:     Discharge Recommendations: home with home health  Discharge Equipment Recommendations: none  Barriers to discharge: Inaccessible home and Decreased caregiver support    Assessment:     Karina Rojas is a 82 y.o. female admitted with a medical diagnosis of Status post hip surgery.  She presents with the following impairments/functional limitations: weakness, impaired endurance, gait instability, impaired functional mobility, impaired balance, decreased lower extremity function, abnormal tone, orthopedic precautions .    PT wrapped left ankle into DF using 2-2" ace wraps since that is all that was available on nsg unit, but weight of pt's foot overcame the strength of the ace wraps. Pt has not been willing to amb with left foot drop due to FOF. PT demonstrated to her how to compensate with increased hip flexion and pt was able to amb a short distance. PT procurred 2-6" wraps from ER to use next visit which should work better than the 2"    Rehab Prognosis: Fair; patient would benefit from acute skilled PT services to address these deficits and reach maximum level of function.    Recent Surgery: * No surgery found *      Plan:     During this hospitalization, patient to be seen BID to address the identified rehab impairments via gait training, therapeutic activities, therapeutic exercises, wheelchair management/training and progress toward the following goals:    Plan of Care Expires:  03/24/23    Subjective     Chief Complaint: Pt has no c/o right hip pain. 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 She expressed concern that her stent may be clogged again with clots. States she has MDV with vascular MD in April. States her son look on [employer] for an AFO. PT asked her to have him show PT before he orders it. We talked about her getting a plastic AFO from [employer] " [employer] since her granddtr works there.  Patient/Family Comments/goals: DC home when medically stable.  Pain/Comfort:  Pain Rating 1: 5/10  Location - Side 1: Bilateral  Location - Orientation 1: midline  Location 1: back  Pain Addressed 1: Pre-medicate for activity      Objective:     Communicated with Kaleigh Woods RN prior to session. States it's OK for pt to participate with PT despite pt c/o of being nauseous. States pt has not vomited, just spitting up. Patient found HOB elevated with oxygen upon PT entry to room.     General Precautions: Standard, fall  Orthopedic Precautions: RLE weight bearing as tolerated, RLE posterior precautions (No AROM hip abd RLE. Foot drop left ankle.)  Braces:    Respiratory Status: Nasal cannula, flow 3 L/min     Functional Mobility:  Bed Mobility:     Rolling Left:  supervision  Scooting: supervision  Supine to Sit: minimum assistance  Sit to Supine: minimum assistance  Transfers:     Sit to Stand:  stand by assistance with rolling walker  Gait: PT wrapped left ankle into DF using 2-2" ace wraps. Ace wraps were too weak to overcome the weight of her foot. She sat down on the bed stating she could not walk due to foot drop on left. PT demonstrated how she could compensate with increased hip flexion. She tried this method and was able to amb 3 ft laterally along EOB with min Ax1 using RW.      AM-PAC 6 CLICK MOBILITY  Turning over in bed (including adjusting bedclothes, sheets and blankets)?: 4  Sitting down on and standing up from a chair with arms (e.g., wheelchair, bedside commode, etc.): 3  Moving from lying on back to sitting on the side of the bed?: 3  Moving to and from a bed to a chair (including a wheelchair)?: 3  Need to walk in hospital room?: 3  Climbing 3-5 steps with a railing?: 1  Basic Mobility Total Score: 17       Treatment & Education:  Bed ex's RLE X 20 reps: HS, SAQ 2# x 15, resistive hip add using pillow with knees flexed, hip add with knee extended " "(PT lifting RLE into hip abd), AP, resisted knee flex using green tband. Pt amb'd along EOB as stated above. Pt's feet turned reddish-purple within 5-10" of sitting on EOB, but turned flesh colored upon return to supine with feet elevated.    Patient left  HOB elevated wit6h BLE elevated on 3 pillows with heels floating free and propped against foam wedge to keep blanket off edge of toes.  with all lines intact..    GOALS:   Multidisciplinary Problems       Physical Therapy Goals          Problem: Physical Therapy    Goal Priority Disciplines Outcome Goal Variances Interventions   Physical Therapy Goal     PT, PT/OT Ongoing, Progressing     Description: STG - 3 weeks  1. Pt min A x 1 with bed mob.-MET  2. Pt min A x 1  with transfers using RW.-MET  3. Pt will amb 25 ft with min A x 1 using RW.-PROGRESSING  4. Pt will have 3/5 RLE strength.-PROGRESSING  5. Pt will recall 3/3 SHYANNE precautions.-NOT MET    LTG - 6 weeks  1. Pt SBA with bed mob.  2. Pt SBA with transfers using RW.  3. Pt will amb 50 ft with SBA using RW.  4. Pt will have 4-/5 RLE strength.                       Time Tracking:     PT Received On: 02/22/23  PT Start Time: 1328     PT Stop Time: 1355  PT Total Time (min): 27 min     Billable Minutes: Gait Training 8, Therapeutic Exercise 19, and Total Time 27 min    Treatment Type: Treatment  PT/PTA: PT     PTA Visit Number: 0     02/22/2023  "

## 2023-02-22 NOTE — PT/OT/SLP PROGRESS
Physical Therapy Treatment    Patient Name:  Karina Rojas   MRN:  [id number]    Recommendations:     Discharge Recommendations: home with home health  Discharge Equipment Recommendations: none  Barriers to discharge: Inaccessible home and Decreased caregiver support    Assessment:     Karina Rojas is a 82 y.o. female admitted with a medical diagnosis of Status post hip surgery.  She presents with the following impairments/functional limitations: weakness, impaired endurance, gait instability, impaired balance, impaired functional mobility, decreased lower extremity function, orthopedic precautions. Treatment stopped early today due to frequent reports of nausea and gagging, but patient did not vomit. Patient required occasional rest breaks due to nausea and reports of dizziness while sitting up and standing. PTA noticed yellow colored drainage at R hip incision. After moving from supine to sitting EOB, patient's bilateral feet were a dark red/purple color after only sitting up for about 30 seconds. SHAHRZAD Farris was notified of feet discoloration and drainage.   Rehab Prognosis: Fair; patient would benefit from acute skilled PT services to address these deficits and reach maximum level of function.    Recent Surgery: * No surgery found *      Plan:     During this hospitalization, patient to be seen BID to address the identified rehab impairments via gait training, therapeutic activities, therapeutic exercises and progress toward the following goals:    Plan of Care Expires:  03/24/23    Subjective     Chief Complaint: Patient stated that she had pain all over after completion of treatment, nurse notified.   Patient/Family Comments/goals: Pt to dc home with home health, when able.   Pain/Comfort:  Pain Rating 1: 0/10      Objective:     Communicated with pt prior to session. Patient found HOB elevated with oxygen upon PTA entry to room.     General Precautions: Standard, fall  Orthopedic Precautions: RLE weight  bearing as tolerated, RLE posterior precautions (no active R hip ABD)  Braces:    Respiratory Status: Nasal cannula, flow 3 L/min     Functional Mobility:  Bed Mobility:     Supine to Sit: stand by assistance  Sit to Supine: moderate assistance and for BLE  Transfers:     Sit to Stand:  minimum assistance and moderate assistance with rolling walker  Balance: Pt performed static standing balance at EOB with rw for approx. 30 seconds.        AM-PAC 6 CLICK MOBILITY  Turning over in bed (including adjusting bedclothes, sheets and blankets)?: 4  Sitting down on and standing up from a chair with arms (e.g., wheelchair, bedside commode, etc.): 3  Moving from lying on back to sitting on the side of the bed?: 4  Moving to and from a bed to a chair (including a wheelchair)?: 2  Need to walk in hospital room?: 2  Climbing 3-5 steps with a railing?: 1  Basic Mobility Total Score: 16       Treatment & Education:  LAQs 20x BLE, seated marching 10x2 on LLE, sit to stand 2x one with 30 second stand, but patient quickly sat down on bed after second stand    Patient left HOB elevated with all lines intact and call button in reach.    GOALS:   Multidisciplinary Problems       Physical Therapy Goals          Problem: Physical Therapy    Goal Priority Disciplines Outcome Goal Variances Interventions   Physical Therapy Goal     PT, PT/OT Ongoing, Progressing     Description: STG - 3 weeks  1. Pt min A x 1 with bed mob.-MET  2. Pt min A x 1  with transfers using RW.-MET  3. Pt will amb 25 ft with min A x 1 using RW.-PROGRESSING  4. Pt will have 3/5 RLE strength.-PROGRESSING  5. Pt will recall 3/3 SHYANNE precautions.-NOT MET    LTG - 6 weeks  1. Pt SBA with bed mob.  2. Pt SBA with transfers using RW.  3. Pt will amb 50 ft with SBA using RW.  4. Pt will have 4-/5 RLE strength.                       Time Tracking:     PT Received On: 02/22/23  PT Start Time: 1012     PT Stop Time: 1035  PT Total Time (min): 23 min     Billable Minutes:  Therapeutic Activity 18 and Therapeutic Exercise 5    Treatment Type: Treatment  PT/PTA: PTA     PTA Visit Number: 2     02/22/2023

## 2023-02-22 NOTE — PLAN OF CARE
Problem: Pain Acute  Goal: Acceptable Pain Control and Functional Ability  Outcome: Ongoing, Not Progressing

## 2023-02-23 ENCOUNTER — CLINICAL SUPPORT (OUTPATIENT)
Dept: WOUND CARE | Facility: HOSPITAL | Age: 83
DRG: 561 | End: 2023-02-23
Attending: HOSPITALIST
Payer: MEDICARE

## 2023-02-23 DIAGNOSIS — I73.9 PAD (PERIPHERAL ARTERY DISEASE): ICD-10-CM

## 2023-02-23 DIAGNOSIS — L89.616 PRESSURE INJURY OF DEEP TISSUE OF RIGHT HEEL: Primary | ICD-10-CM

## 2023-02-23 DIAGNOSIS — L97.528 NON-PRESSURE CHRONIC ULCER OF OTHER PART OF LEFT FOOT WITH OTHER SPECIFIED SEVERITY: ICD-10-CM

## 2023-02-23 DIAGNOSIS — L89.626 PRESSURE INJURY OF DEEP TISSUE OF LEFT HEEL: ICD-10-CM

## 2023-02-23 LAB
GLUCOSE SERPL-MCNC: 121 MG/DL (ref 70–105)
GLUCOSE SERPL-MCNC: 211 MG/DL (ref 70–105)
GLUCOSE SERPL-MCNC: 219 MG/DL (ref 70–105)
GLUCOSE SERPL-MCNC: 236 MG/DL (ref 70–105)

## 2023-02-23 PROCEDURE — 94761 N-INVAS EAR/PLS OXIMETRY MLT: CPT

## 2023-02-23 PROCEDURE — 27000221 HC OXYGEN, UP TO 24 HOURS

## 2023-02-23 PROCEDURE — 99213 OFFICE O/P EST LOW 20 MIN: CPT

## 2023-02-23 PROCEDURE — 25000003 PHARM REV CODE 250: Performed by: HOSPITALIST

## 2023-02-23 PROCEDURE — 82962 GLUCOSE BLOOD TEST: CPT

## 2023-02-23 PROCEDURE — 63600175 PHARM REV CODE 636 W HCPCS

## 2023-02-23 PROCEDURE — 25000003 PHARM REV CODE 250

## 2023-02-23 PROCEDURE — 11000004 HC SNF PRIVATE

## 2023-02-23 RX ORDER — BISACODYL 10 MG
10 SUPPOSITORY, RECTAL RECTAL DAILY PRN
Status: DISCONTINUED | OUTPATIENT
Start: 2023-02-23 | End: 2023-03-10 | Stop reason: HOSPADM

## 2023-02-23 RX ADMIN — LEVOTHYROXINE SODIUM 25 MCG: 0.03 TABLET ORAL at 05:02

## 2023-02-23 RX ADMIN — FAMOTIDINE 20 MG: 20 TABLET, FILM COATED ORAL at 09:02

## 2023-02-23 RX ADMIN — ROPINIROLE 0.25 MG: 0.25 TABLET, FILM COATED ORAL at 09:02

## 2023-02-23 RX ADMIN — MELATONIN 6 MG: at 09:02

## 2023-02-23 RX ADMIN — WARFARIN SODIUM 4 MG: 1 TABLET ORAL at 04:02

## 2023-02-23 RX ADMIN — MUPIROCIN: 20 OINTMENT TOPICAL at 09:02

## 2023-02-23 RX ADMIN — OXYCODONE HYDROCHLORIDE 10 MG: 10 TABLET ORAL at 09:02

## 2023-02-23 RX ADMIN — AMIODARONE HYDROCHLORIDE 200 MG: 200 TABLET ORAL at 09:02

## 2023-02-23 RX ADMIN — FUROSEMIDE 40 MG: 40 TABLET ORAL at 09:02

## 2023-02-23 RX ADMIN — ZINC OXIDE TOPICAL OINT.: at 09:02

## 2023-02-23 RX ADMIN — BISACODYL 10 MG: 10 SUPPOSITORY RECTAL at 09:02

## 2023-02-23 RX ADMIN — INSULIN ASPART 2 UNITS: 100 INJECTION, SOLUTION INTRAVENOUS; SUBCUTANEOUS at 10:02

## 2023-02-23 RX ADMIN — INSULIN ASPART 1 UNITS: 100 INJECTION, SOLUTION INTRAVENOUS; SUBCUTANEOUS at 09:02

## 2023-02-23 RX ADMIN — ATORVASTATIN CALCIUM 40 MG: 40 TABLET, FILM COATED ORAL at 09:02

## 2023-02-23 RX ADMIN — PREDNISONE 5 MG: 5 TABLET ORAL at 09:02

## 2023-02-23 RX ADMIN — INSULIN ASPART 2 UNITS: 100 INJECTION, SOLUTION INTRAVENOUS; SUBCUTANEOUS at 04:02

## 2023-02-23 NOTE — PROGRESS NOTES
"PT attempted to see pt twice this afternoon. In the early afternoon, pt states she was on the bedpan having a BM. PT returned later and CNA was just about to clean pt "because she was wet." PT is unable to try a third attempt due to scheduling conflict and staffing issues.  "

## 2023-02-23 NOTE — NURSING
Nurses Note -- 4 Eyes      2/22/2023   10:32 PM      Skin assessed during: Q Shift Change      [] No Pressure Injuries Present    []Prevention Measures Documented      [] Yes- Altered Skin Integrity Present or Discovered   [] LDA Added if Not in Epic (Describe Wound)   [x] New Altered Skin Integrity was Present on Admit and Documented in LDA   [] Wound Image Taken    Wound Care Consulted? Yes    Attending Nurse:  Lisa Davidson RN     Second RN/Staff Member: Kaleigh Woods RN

## 2023-02-23 NOTE — PROGRESS NOTES
"WOUND CARE CONSULT          Patient Name: Karina Rojas is a 82 y.o. female       Chief Complaint:   Wound Care Consult    Review of patient's allergies indicates:   Allergen Reactions    Tylenol [acetaminophen] Anaphylaxis and Shortness Of Breath        I have reviewed the patient's medical, surgical, family and social history.      Subjective:    HPI     Wound Location: bilateral heels, left dorsal foot    Wound Duration: heels onset 2/7/23; foot onset 2/16/23    Wound Context: heels- DTIs (pressure); dorsal foot- arterial    Wound Pain: moderate    83 YO WF currently in swing bed following right total hip arthroplasty. Pt has h/o PAD and is established with Dr. Bazan, but has not had recent follow up. In the past, the patient reports he did not want to intervene below the knee because she "wasn't having any problems." Pt smoked for over 60 years, but quit last September.   Following last week's exam, a follow up appt with vascular surgery was scheduled, but is not for several weeks. Arterial studies remain pending.           Medications:    Current Facility-Administered Medications on File Prior to Visit   Medication Dose Route Frequency Provider Last Rate Last Admin    amiodarone tablet 200 mg  200 mg Oral BID PREETHI Henson   200 mg at 02/23/23 0915    atorvastatin tablet 40 mg  40 mg Oral QHS PREETHI Henson   40 mg at 02/22/23 2100    bisacodyL suppository 10 mg  10 mg Rectal Daily PRN Jorge Buckley DO   10 mg at 02/23/23 0916    calcium carbonate 200 mg calcium (500 mg) chewable tablet 500 mg  500 mg Oral BID PRN Jorge Buckley DO        dextrose 50% injection 12.5 g  12.5 g Intravenous PRN PREETHI Henson        dextrose 50% injection 25 g  25 g Intravenous PRN PREETHI Henson        digoxin tablet 0.125 mg  0.125 mg Oral Daily PADDY HensonP   0.125 mg at 02/20/23 0829    famotidine tablet 20 mg  20 mg Oral Daily Jorge Buckley DO   20 mg at 02/23/23 0915    " furosemide tablet 40 mg  40 mg Oral Daily PADDY HensonP   40 mg at 02/23/23 0915    glucagon (human recombinant) injection 1 mg  1 mg Intramuscular PRN PREETHI Henson        glucose chewable tablet 16 g  16 g Oral PRN PREETHI Henson        glucose chewable tablet 24 g  24 g Oral PRN PREETHI Henson        insulin aspart U-100 injection 0-5 Units  0-5 Units Subcutaneous QID (AC + HS) PRN PADDY HensonP   2 Units at 02/23/23 1049    levothyroxine tablet 25 mcg  25 mcg Oral Before breakfast PADDY HensonP   25 mcg at 02/23/23 0524    magnesium hydroxide 400 mg/5 ml suspension 2,400 mg  30 mL Oral Daily PRN Jorge Buckley DO   2,400 mg at 02/22/23 0842    melatonin tablet 6 mg  6 mg Oral Nightly PRN Jorge Buckley DO   6 mg at 02/22/23 2100    mupirocin 2 % ointment   Topical (Top) Daily Jorge Buckley DO   Given at 02/23/23 0922    ondansetron disintegrating tablet 4 mg  4 mg Oral Q8H PRN Jorge Buckley DO   4 mg at 02/22/23 1147    oxyCODONE immediate release tablet Tab 10 mg  10 mg Oral Q6H PRN PREETHI Henson   10 mg at 02/23/23 0919    predniSONE tablet 5 mg  5 mg Oral Daily PADDY HensonP   5 mg at 02/23/23 0916    prochlorperazine injection Soln 5 mg  5 mg Intramuscular Q6H PRN PADDY HensonP   5 mg at 02/13/23 1740    rOPINIRole tablet 0.25 mg  0.25 mg Oral BID Jorge Buckley DO   0.25 mg at 02/23/23 0915    senna-docusate 8.6-50 mg per tablet 1 tablet  1 tablet Oral BID PRN Jorge Buckley DO   1 tablet at 02/22/23 0841    warfarin (COUMADIN) tablet 4 mg  4 mg Oral Every Tues, Thurs, Sat, Sun PADDY HensonP   4 mg at 02/21/23 1728    zinc oxide 16% (NUNO'S BUTT PASTE) topical ointment   Topical (Top) Daily Jorge Buckley DO   Given at 02/23/23 0922    [DISCONTINUED] GENERIC EXTERNAL MEDICATION     Generic External Data Provider         Current Outpatient Medications on File Prior to Visit   Medication Sig Dispense Refill    amiodarone  (PACERONE) 200 MG Tab Take 200 mg by mouth 2 (two) times daily. Take 1 tablet by mouth in the morning and 1 tablet before bedtime.      atorvastatin (LIPITOR) 40 MG tablet Take 40 mg by mouth.      digoxin (LANOXIN) 125 mcg tablet Take 125 mcg by mouth.      fluticasone/umeclidin/vilanter (TRELEGY ELLIPTA INHL) Inhale into the lungs daily as needed.      furosemide (LASIX) 40 MG tablet Take 40 mg by mouth once daily.      levothyroxine (SYNTHROID) 50 MCG tablet Take 1 tablet (50 mcg total) by mouth before breakfast. (Patient taking differently: Take 25 mcg by mouth before breakfast.) 30 tablet 11    metFORMIN (GLUCOPHAGE) 1000 MG tablet Take 1,000 mg by mouth daily with breakfast.      predniSONE (DELTASONE) 5 MG tablet Take 5 mg by mouth.      warfarin (COUMADIN) 4 MG tablet Take 4 mg by mouth. Take 1 tablet by mouth 4 times a week.      warfarin (COUMADIN) 5 MG tablet Take by mouth.            Physical Exam  Vitals reviewed.   Constitutional:       Appearance: She is ill-appearing.      Interventions: Nasal cannula in place.   HENT:      Head: Normocephalic and atraumatic.      Right Ear: External ear normal.      Left Ear: External ear normal.   Cardiovascular:      Pulses:           Dorsalis pedis pulses are 0 on the right side and 0 on the left side.        Posterior tibial pulses are detected w/ Doppler on the right side and detected w/ Doppler on the left side.   Pulmonary:      Effort: Pulmonary effort is normal.      Breath sounds: Decreased air movement present.   Musculoskeletal:      Right lower leg: No edema.      Left lower leg: No edema.      Right foot: Foot drop present.        Feet:    Feet:      Right foot:      Skin integrity: Ulcer (DTI heel) present.      Left foot:      Skin integrity: Ulcer (DTI heel) present.   Skin:     General: Skin is warm and dry.      Findings: Wound present.   Neurological:      Mental Status: She is alert.        Please see Wound Docs for complete Wound Assessment and  lower extremity assessment when applicable.    Documentation of any procedures can be viewed in Wound Docs if applicable.         Assessment and Plan:    1. Pressure injury of deep tissue of right heel  Betadine daily  Offload  Cover with foam  OK to ambulate with therapy    2. Pressure injury of deep tissue of left heel  As above    3. PAD (peripheral artery disease)  F/U with Dr. Bazan as scheduled     4. 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     Follow Up & Goals:     Follow up in about 1 week (around 3/2/2023).     Short term goals  Reduction of devitalized tissue  Reduction of bacterial burden  Stimulate and/or maintain acute phases of wound healing  Promote granulation formation  Promote epithelization  Promote compliance with plan of care  Address factors affecting wound healing  Optimize nutritional status  Optimize vascular status    Long term goals  Prevent loss of limb  Prevent infection  Conservative Wound Treatment  Prevent recurrent ulcerations  Resolve wounds

## 2023-02-23 NOTE — PLAN OF CARE
Multidisciplinary meeting done.   PT suggests getting patient an AFO- states she has some ordered for the facility and her son is working to get her one. Will reach out to medical supply store to see if we can get one for patient sooner than several weeks.     Will reassess discharge next week. Patient and family updated on plan of care.

## 2023-02-24 LAB
ANION GAP SERPL CALCULATED.3IONS-SCNC: 9 MMOL/L (ref 7–16)
BASOPHILS # BLD AUTO: 0.05 K/UL (ref 0–0.2)
BASOPHILS NFR BLD AUTO: 0.6 % (ref 0–1)
BUN SERPL-MCNC: 16 MG/DL (ref 7–18)
BUN/CREAT SERPL: 21 (ref 6–20)
CALCIUM SERPL-MCNC: 8.4 MG/DL (ref 8.5–10.1)
CHLORIDE SERPL-SCNC: 103 MMOL/L (ref 98–107)
CO2 SERPL-SCNC: 36 MMOL/L (ref 21–32)
CREAT SERPL-MCNC: 0.75 MG/DL (ref 0.55–1.02)
DIFFERENTIAL METHOD BLD: ABNORMAL
EGFR (NO RACE VARIABLE) (RUSH/TITUS): 81 ML/MIN/1.73M²
EOSINOPHIL # BLD AUTO: 0.18 K/UL (ref 0–0.5)
EOSINOPHIL NFR BLD AUTO: 2 % (ref 1–4)
ERYTHROCYTE [DISTWIDTH] IN BLOOD BY AUTOMATED COUNT: 16.7 % (ref 11.5–14.5)
GLUCOSE SERPL-MCNC: 103 MG/DL (ref 74–106)
GLUCOSE SERPL-MCNC: 195 MG/DL (ref 70–105)
GLUCOSE SERPL-MCNC: 202 MG/DL (ref 70–105)
GLUCOSE SERPL-MCNC: 215 MG/DL (ref 70–105)
HCT VFR BLD AUTO: 31.5 % (ref 38–47)
HGB BLD-MCNC: 9 G/DL (ref 12–16)
INR BLD: 2.23
LYMPHOCYTES # BLD AUTO: 1.67 K/UL (ref 1–4.8)
LYMPHOCYTES NFR BLD AUTO: 18.7 % (ref 27–41)
MCH RBC QN AUTO: 27.4 PG (ref 27–31)
MCHC RBC AUTO-ENTMCNC: 28.6 G/DL (ref 32–36)
MCV RBC AUTO: 96 FL (ref 80–96)
MONOCYTES # BLD AUTO: 0.79 K/UL (ref 0–0.8)
MONOCYTES NFR BLD AUTO: 8.8 % (ref 2–6)
MPC BLD CALC-MCNC: 9.2 FL (ref 9.4–12.4)
NEUTROPHILS # BLD AUTO: 6.25 K/UL (ref 1.8–7.7)
NEUTROPHILS NFR BLD AUTO: 69.9 % (ref 53–65)
PLATELET # BLD AUTO: 453 K/UL (ref 150–400)
POTASSIUM SERPL-SCNC: 4.9 MMOL/L (ref 3.5–5.1)
PROTHROMBIN TIME: 24.6 SECONDS (ref 11.7–14.7)
RBC # BLD AUTO: 3.28 M/UL (ref 4.2–5.4)
SODIUM SERPL-SCNC: 143 MMOL/L (ref 136–145)
WBC # BLD AUTO: 8.94 K/UL (ref 4.5–11)

## 2023-02-24 PROCEDURE — 82962 GLUCOSE BLOOD TEST: CPT

## 2023-02-24 PROCEDURE — 97116 GAIT TRAINING THERAPY: CPT | Mod: CQ

## 2023-02-24 PROCEDURE — 63600175 PHARM REV CODE 636 W HCPCS

## 2023-02-24 PROCEDURE — 11000004 HC SNF PRIVATE

## 2023-02-24 PROCEDURE — 85025 COMPLETE CBC W/AUTO DIFF WBC: CPT | Performed by: NURSE PRACTITIONER

## 2023-02-24 PROCEDURE — 99900035 HC TECH TIME PER 15 MIN (STAT)

## 2023-02-24 PROCEDURE — 27000221 HC OXYGEN, UP TO 24 HOURS

## 2023-02-24 PROCEDURE — 25000003 PHARM REV CODE 250

## 2023-02-24 PROCEDURE — 25000003 PHARM REV CODE 250: Performed by: HOSPITALIST

## 2023-02-24 PROCEDURE — 85610 PROTHROMBIN TIME: CPT

## 2023-02-24 PROCEDURE — 97110 THERAPEUTIC EXERCISES: CPT | Mod: CQ

## 2023-02-24 PROCEDURE — 80048 BASIC METABOLIC PNL TOTAL CA: CPT | Performed by: NURSE PRACTITIONER

## 2023-02-24 RX ORDER — POLYETHYLENE GLYCOL 3350 17 G/17G
17 POWDER, FOR SOLUTION ORAL DAILY
Status: DISCONTINUED | OUTPATIENT
Start: 2023-02-25 | End: 2023-03-10 | Stop reason: HOSPADM

## 2023-02-24 RX ADMIN — ATORVASTATIN CALCIUM 40 MG: 40 TABLET, FILM COATED ORAL at 08:02

## 2023-02-24 RX ADMIN — FAMOTIDINE 20 MG: 20 TABLET, FILM COATED ORAL at 09:02

## 2023-02-24 RX ADMIN — PREDNISONE 5 MG: 5 TABLET ORAL at 09:02

## 2023-02-24 RX ADMIN — MUPIROCIN: 20 OINTMENT TOPICAL at 09:02

## 2023-02-24 RX ADMIN — ROPINIROLE 0.25 MG: 0.25 TABLET, FILM COATED ORAL at 08:02

## 2023-02-24 RX ADMIN — MELATONIN 6 MG: at 09:02

## 2023-02-24 RX ADMIN — OXYCODONE HYDROCHLORIDE 10 MG: 10 TABLET ORAL at 09:02

## 2023-02-24 RX ADMIN — OXYCODONE HYDROCHLORIDE 10 MG: 10 TABLET ORAL at 02:02

## 2023-02-24 RX ADMIN — FUROSEMIDE 40 MG: 40 TABLET ORAL at 09:02

## 2023-02-24 RX ADMIN — INSULIN ASPART 1 UNITS: 100 INJECTION, SOLUTION INTRAVENOUS; SUBCUTANEOUS at 09:02

## 2023-02-24 RX ADMIN — AMIODARONE HYDROCHLORIDE 200 MG: 200 TABLET ORAL at 09:02

## 2023-02-24 RX ADMIN — AMIODARONE HYDROCHLORIDE 200 MG: 200 TABLET ORAL at 08:02

## 2023-02-24 RX ADMIN — ROPINIROLE 0.25 MG: 0.25 TABLET, FILM COATED ORAL at 09:02

## 2023-02-24 RX ADMIN — OXYCODONE HYDROCHLORIDE 10 MG: 10 TABLET ORAL at 04:02

## 2023-02-24 RX ADMIN — SENNOSIDES AND DOCUSATE SODIUM 1 TABLET: 8.6; 5 TABLET ORAL at 09:02

## 2023-02-24 RX ADMIN — ZINC OXIDE TOPICAL OINT.: at 09:02

## 2023-02-24 RX ADMIN — LEVOTHYROXINE SODIUM 25 MCG: 0.03 TABLET ORAL at 06:02

## 2023-02-24 NOTE — NURSING
Nurses Note -- 4 Eyes      2/24/2023   7:21 AM      Skin assessed during: Q Shift Change      [] No Pressure Injuries Present    [x]Prevention Measures Documented      [x] Yes- Altered Skin Integrity Present or Discovered   [x] LDA Added if Not in Epic (Describe Wound)   [x] New Altered Skin Integrity was Present on Admit and Documented in LDA   [] Wound Image Taken    Wound Care Consulted? Yes    Attending Nurse:  Rosangela Carter RN     Second RN/Staff Member:  Lisa Davidson RN

## 2023-02-24 NOTE — PROGRESS NOTES
Patient not seen for first treatment due to patient receiving wound care during PT attempt. Will follow up later.

## 2023-02-24 NOTE — PT/OT/SLP PROGRESS
Physical Therapy Treatment    Patient Name:  Karina Rojas   MRN:  [id number]    Recommendations:     Discharge Recommendations: home with home health  Discharge Equipment Recommendations: none  Barriers to discharge: Inaccessible home and Decreased caregiver support    Assessment:     Karina Rojas is a 82 y.o. female admitted with a medical diagnosis of Status post hip surgery.  She presents with the following impairments/functional limitations: weakness, impaired endurance, gait instability, impaired functional mobility, impaired balance, decreased lower extremity function, orthopedic precautions. Patient had ankle brace on LLE this date. She was able to perform gait training on EOB with frequent seated rest breaks. Patient expressed fear of falling prior to gait training, but she was agreeable to attempt. Patient with no reports of adverse effects after completion of treatment.   Rehab Prognosis: Fair; patient would benefit from acute skilled PT services to address these deficits and reach maximum level of function.    Recent Surgery: * No surgery found *      Plan:     During this hospitalization, patient to be seen BID to address the identified rehab impairments via gait training, therapeutic activities, therapeutic exercises, wheelchair management/training and progress toward the following goals:    Plan of Care Expires:  03/24/23    Subjective     Chief Complaint: Back pain  Patient/Family Comments/goals: DC home when medically stable.  Pain/Comfort:  Pain Rating 1: 7/10  Location - Side 1: Bilateral  Location - Orientation 1: generalized  Location 1: back  Pain Addressed 1: Reposition      Objective:     Communicated with pt prior to session. Patient found HOB elevated with oxygen upon PTA entry to room.     General Precautions: Standard, fall  Orthopedic Precautions: RLE weight bearing as tolerated, RLE posterior precautions (No active R hip ABD)  Braces:    Respiratory Status: Nasal cannula, flow 3  L/min     Functional Mobility:  Bed Mobility:     Supine to Sit: stand by assistance  Sit to Supine: minimum assistance  Transfers:     Sit to Stand:  contact guard assistance with rolling walker  Gait: Patient ambulated 3 feet to the R then sat on EOB to rest then 3 feet to the L with CGA/Min A and RW. She performed this 2x.      AM-PAC 6 CLICK MOBILITY  Turning over in bed (including adjusting bedclothes, sheets and blankets)?: 4  Sitting down on and standing up from a chair with arms (e.g., wheelchair, bedside commode, etc.): 3  Moving from lying on back to sitting on the side of the bed?: 4  Moving to and from a bed to a chair (including a wheelchair)?: 3  Need to walk in hospital room?: 3  Climbing 3-5 steps with a railing?: 1  Basic Mobility Total Score: 18       Treatment & Education:  LAQs BLE 20x, seated marching LLE 20x, Hip ADD 20x, ankle pumps 20x with max A on LLE    Patient left HOB elevated with heels floating and all lines intact, call button in reach, and daughter and staff member present.    GOALS:   Multidisciplinary Problems       Physical Therapy Goals          Problem: Physical Therapy    Goal Priority Disciplines Outcome Goal Variances Interventions   Physical Therapy Goal     PT, PT/OT Ongoing, Progressing     Description: STG - 3 weeks  1. Pt min A x 1 with bed mob.-MET  2. Pt min A x 1  with transfers using RW.-MET  3. Pt will amb 25 ft with min A x 1 using RW.-PROGRESSING  4. Pt will have 3/5 RLE strength.-PROGRESSING  5. Pt will recall 3/3 SHYANNE precautions.-NOT MET    LTG - 6 weeks  1. Pt SBA with bed mob.  2. Pt SBA with transfers using RW.  3. Pt will amb 50 ft with SBA using RW.  4. Pt will have 4-/5 RLE strength.                       Time Tracking:     PT Received On: 02/24/23  PT Start Time: 1433     PT Stop Time: 1500  PT Total Time (min): 27 min     Billable Minutes: Gait Training 10, Therapeutic Activity 5, and Therapeutic Exercise 12    Treatment Type: Treatment  PT/PTA: PTA      PTA Visit Number: 1 02/24/2023

## 2023-02-24 NOTE — NURSING
Nurses Note -- 4 Eyes      2/24/2023   12:17 AM      Skin assessed during: Q Shift Change      [] No Pressure Injuries Present    []Prevention Measures Documented      [] Yes- Altered Skin Integrity Present or Discovered   [] LDA Added if Not in Epic (Describe Wound)   [x] New Altered Skin Integrity was Present on Admit and Documented in LDA   [] Wound Image Taken    Wound Care Consulted? Yes    Attending Nurse:  Lisa Davidson RN     Second RN/Staff Member: Rosangela Fallon

## 2023-02-24 NOTE — PLAN OF CARE
Problem: Adult Inpatient Plan of Care  Goal: Patient-Specific Goal (Individualized)  Outcome: Ongoing, Progressing   Patient will be able to sustain lungs on 2 lpm O2

## 2023-02-25 LAB
GLUCOSE SERPL-MCNC: 140 MG/DL (ref 70–105)
GLUCOSE SERPL-MCNC: 166 MG/DL (ref 70–105)
GLUCOSE SERPL-MCNC: 229 MG/DL (ref 70–105)
GLUCOSE SERPL-MCNC: 238 MG/DL (ref 70–105)

## 2023-02-25 PROCEDURE — 27000221 HC OXYGEN, UP TO 24 HOURS

## 2023-02-25 PROCEDURE — 11000004 HC SNF PRIVATE

## 2023-02-25 PROCEDURE — 94761 N-INVAS EAR/PLS OXIMETRY MLT: CPT

## 2023-02-25 PROCEDURE — 99900035 HC TECH TIME PER 15 MIN (STAT)

## 2023-02-25 PROCEDURE — 25000003 PHARM REV CODE 250: Performed by: HOSPITALIST

## 2023-02-25 PROCEDURE — 25000003 PHARM REV CODE 250

## 2023-02-25 PROCEDURE — 82962 GLUCOSE BLOOD TEST: CPT

## 2023-02-25 PROCEDURE — 63600175 PHARM REV CODE 636 W HCPCS

## 2023-02-25 RX ADMIN — SENNOSIDES AND DOCUSATE SODIUM 1 TABLET: 8.6; 5 TABLET ORAL at 08:02

## 2023-02-25 RX ADMIN — BISACODYL 10 MG: 10 SUPPOSITORY RECTAL at 10:02

## 2023-02-25 RX ADMIN — OXYCODONE HYDROCHLORIDE 10 MG: 10 TABLET ORAL at 08:02

## 2023-02-25 RX ADMIN — ZINC OXIDE TOPICAL OINT.: at 08:02

## 2023-02-25 RX ADMIN — INSULIN ASPART 2 UNITS: 100 INJECTION, SOLUTION INTRAVENOUS; SUBCUTANEOUS at 10:02

## 2023-02-25 RX ADMIN — PREDNISONE 5 MG: 5 TABLET ORAL at 08:02

## 2023-02-25 RX ADMIN — MUPIROCIN: 20 OINTMENT TOPICAL at 08:02

## 2023-02-25 RX ADMIN — AMIODARONE HYDROCHLORIDE 200 MG: 200 TABLET ORAL at 09:02

## 2023-02-25 RX ADMIN — FAMOTIDINE 20 MG: 20 TABLET, FILM COATED ORAL at 08:02

## 2023-02-25 RX ADMIN — ROPINIROLE 0.25 MG: 0.25 TABLET, FILM COATED ORAL at 08:02

## 2023-02-25 RX ADMIN — MELATONIN 6 MG: at 09:02

## 2023-02-25 RX ADMIN — POLYETHYLENE GLYCOL 3350 17 G: 17 POWDER, FOR SOLUTION ORAL at 08:02

## 2023-02-25 RX ADMIN — WARFARIN SODIUM 4 MG: 1 TABLET ORAL at 04:02

## 2023-02-25 RX ADMIN — FUROSEMIDE 40 MG: 40 TABLET ORAL at 08:02

## 2023-02-25 RX ADMIN — LEVOTHYROXINE SODIUM 25 MCG: 0.03 TABLET ORAL at 06:02

## 2023-02-25 RX ADMIN — INSULIN ASPART 2 UNITS: 100 INJECTION, SOLUTION INTRAVENOUS; SUBCUTANEOUS at 05:02

## 2023-02-25 RX ADMIN — OXYCODONE HYDROCHLORIDE 10 MG: 10 TABLET ORAL at 04:02

## 2023-02-25 RX ADMIN — ROPINIROLE 0.25 MG: 0.25 TABLET, FILM COATED ORAL at 09:02

## 2023-02-25 RX ADMIN — ATORVASTATIN CALCIUM 40 MG: 40 TABLET, FILM COATED ORAL at 09:02

## 2023-02-25 RX ADMIN — AMIODARONE HYDROCHLORIDE 200 MG: 200 TABLET ORAL at 08:02

## 2023-02-25 RX ADMIN — OXYCODONE HYDROCHLORIDE 10 MG: 10 TABLET ORAL at 01:02

## 2023-02-25 NOTE — PLAN OF CARE
Problem: Adult Inpatient Plan of Care  Goal: Plan of Care Review  Outcome: Ongoing, Progressing  Goal: Patient-Specific Goal (Individualized)  Outcome: Ongoing, Progressing  Goal: Absence of Hospital-Acquired Illness or Injury  Outcome: Ongoing, Progressing  Goal: Optimal Comfort and Wellbeing  Outcome: Ongoing, Progressing     Problem: Fall Injury Risk  Goal: Absence of Fall and Fall-Related Injury  Outcome: Ongoing, Progressing     Problem: Impaired Wound Healing  Goal: Optimal Wound Healing  Outcome: Ongoing, Progressing     Problem: Skin Injury Risk Increased  Goal: Skin Health and Integrity  Outcome: Ongoing, Progressing     Problem: Gas Exchange Impaired  Goal: Optimal Gas Exchange  Outcome: Ongoing, Progressing

## 2023-02-25 NOTE — PLAN OF CARE
Problem: Adult Inpatient Plan of Care  Goal: Plan of Care Review  Outcome: Ongoing, Progressing  Goal: Patient-Specific Goal (Individualized)  Outcome: Ongoing, Progressing  Goal: Absence of Hospital-Acquired Illness or Injury  Outcome: Ongoing, Progressing  Goal: Optimal Comfort and Wellbeing  Outcome: Ongoing, Progressing  Goal: Readiness for Transition of Care  Outcome: Ongoing, Progressing     Problem: Diabetes Comorbidity  Goal: Blood Glucose Level Within Targeted Range  Outcome: Ongoing, Progressing  Intervention: Monitor and Manage Glycemia  Flowsheets (Taken 2/25/2023 5971)  Glycemic Management: blood glucose monitored     Problem: Fall Injury Risk  Goal: Absence of Fall and Fall-Related Injury  Outcome: Ongoing, Progressing     Problem: Pain Acute  Goal: Acceptable Pain Control and Functional Ability  Outcome: Ongoing, Not Progressing     Problem: Impaired Wound Healing  Goal: Optimal Wound Healing  Outcome: Ongoing, Progressing     Problem: Skin Injury Risk Increased  Goal: Skin Health and Integrity  Outcome: Ongoing, Progressing

## 2023-02-25 NOTE — NURSING
Nurses Note -- 4 Eyes      2/25/2023   7:37 AM      Skin assessed during: Q Shift Change      [] No Pressure Injuries Present    [x]Prevention Measures Documented      [x] Yes- Altered Skin Integrity Present or Discovered   [x] LDA Added if Not in Epic (Describe Wound)   [x] New Altered Skin Integrity was Present on Admit and Documented in LDA   [] Wound Image Taken    Wound Care Consulted? Yes    Attending Nurse:  Rosangela Carter RN     Second RN/Staff Member:  Nikolas Negron RN

## 2023-02-25 NOTE — NURSING
Nurses Note -- 4 Eyes      2/24/2023   11:47 PM      Skin assessed during: Q Shift Change      [x] No Pressure Injuries Present    []Prevention Measures Documented      [] Yes- Altered Skin Integrity Present or Discovered   [] LDA Added if Not in Epic (Describe Wound)   [] New Altered Skin Integrity was Present on Admit and Documented in LDA   [] Wound Image Taken    Wound Care Consulted? No    Attending Nurse:  IVELISSE CHILDS RN     Second RN/Staff Member:  HARRIET

## 2023-02-26 LAB
GLUCOSE SERPL-MCNC: 148 MG/DL (ref 70–105)
GLUCOSE SERPL-MCNC: 170 MG/DL (ref 70–105)
GLUCOSE SERPL-MCNC: 226 MG/DL (ref 70–105)
GLUCOSE SERPL-MCNC: 243 MG/DL (ref 70–105)

## 2023-02-26 PROCEDURE — 25000003 PHARM REV CODE 250

## 2023-02-26 PROCEDURE — 63600175 PHARM REV CODE 636 W HCPCS

## 2023-02-26 PROCEDURE — 27000221 HC OXYGEN, UP TO 24 HOURS

## 2023-02-26 PROCEDURE — 94761 N-INVAS EAR/PLS OXIMETRY MLT: CPT

## 2023-02-26 PROCEDURE — 25000003 PHARM REV CODE 250: Performed by: HOSPITALIST

## 2023-02-26 PROCEDURE — 11000004 HC SNF PRIVATE

## 2023-02-26 PROCEDURE — 82962 GLUCOSE BLOOD TEST: CPT

## 2023-02-26 PROCEDURE — 99900035 HC TECH TIME PER 15 MIN (STAT)

## 2023-02-26 RX ADMIN — OXYCODONE HYDROCHLORIDE 10 MG: 10 TABLET ORAL at 09:02

## 2023-02-26 RX ADMIN — POLYETHYLENE GLYCOL 3350 17 G: 17 POWDER, FOR SOLUTION ORAL at 09:02

## 2023-02-26 RX ADMIN — AMIODARONE HYDROCHLORIDE 200 MG: 200 TABLET ORAL at 09:02

## 2023-02-26 RX ADMIN — ROPINIROLE 0.25 MG: 0.25 TABLET, FILM COATED ORAL at 09:02

## 2023-02-26 RX ADMIN — PREDNISONE 5 MG: 5 TABLET ORAL at 09:02

## 2023-02-26 RX ADMIN — WARFARIN SODIUM 4 MG: 1 TABLET ORAL at 04:02

## 2023-02-26 RX ADMIN — FAMOTIDINE 20 MG: 20 TABLET, FILM COATED ORAL at 09:02

## 2023-02-26 RX ADMIN — DIGOXIN 0.12 MG: 125 TABLET ORAL at 09:02

## 2023-02-26 RX ADMIN — OXYCODONE HYDROCHLORIDE 10 MG: 10 TABLET ORAL at 04:02

## 2023-02-26 RX ADMIN — INSULIN ASPART 2 UNITS: 100 INJECTION, SOLUTION INTRAVENOUS; SUBCUTANEOUS at 04:02

## 2023-02-26 RX ADMIN — ATORVASTATIN CALCIUM 40 MG: 40 TABLET, FILM COATED ORAL at 09:02

## 2023-02-26 RX ADMIN — OXYCODONE HYDROCHLORIDE 10 MG: 10 TABLET ORAL at 12:02

## 2023-02-26 RX ADMIN — ZINC OXIDE TOPICAL OINT.: at 09:02

## 2023-02-26 RX ADMIN — MELATONIN 6 MG: at 09:02

## 2023-02-26 RX ADMIN — FUROSEMIDE 40 MG: 40 TABLET ORAL at 09:02

## 2023-02-26 RX ADMIN — MUPIROCIN: 20 OINTMENT TOPICAL at 09:02

## 2023-02-26 RX ADMIN — LEVOTHYROXINE SODIUM 25 MCG: 0.03 TABLET ORAL at 05:02

## 2023-02-26 RX ADMIN — INSULIN ASPART 2 UNITS: 100 INJECTION, SOLUTION INTRAVENOUS; SUBCUTANEOUS at 10:02

## 2023-02-26 NOTE — NURSING
Nurses Note -- 4 Eyes      2/25/2023   11:57 PM      Skin assessed during: Q Shift Change      [] No Pressure Injuries Present    []Prevention Measures Documented      [] Yes- Altered Skin Integrity Present or Discovered   [] LDA Added if Not in Epic (Describe Wound)   [x] New Altered Skin Integrity was Present on Admit and Documented in LDA   [] Wound Image Taken    Wound Care Consulted? Yes    Attending Nurse:  Lisa Davidson RN     Second RN/Staff Member: Rosangela Carter RN

## 2023-02-26 NOTE — NURSING
Around @1340 walked into pt's room to do wound care to pt's present wounds. Pt's R surgical incision covered with gauze was completely saturated with serosanguinous drainage that had leaked/drained so much the back of her clothing gown was soaked, her top sheet was soaked, as well as the absorbent  pads that were located under pt. Drainage amount today compared to previous drsg changes were a significant increase. I immediately took pictures of the saturated drsg before and after I removed drsg to change it and clean incision. Pt had surgery on 2/6. I will contact her surgeon Monday morning to make aware of this and monitor until then. Pt states she has an appt this coming up Wednesday (3/1)

## 2023-02-26 NOTE — NURSING
Nurses Note -- 4 Eyes      2/26/2023   7:47 AM      Skin assessed during: Q Shift Change      [] No Pressure Injuries Present    [x]Prevention Measures Documented      [x] Yes- Altered Skin Integrity Present or Discovered   [x] LDA Added if Not in Epic (Describe Wound)   [x] New Altered Skin Integrity was Present on Admit and Documented in LDA   [x] Wound Image Taken    Wound Care Consulted? Yes    Attending Nurse:  Rosangela Carter RN     Second RN/Staff Member:  Lisa Davidson RN

## 2023-02-27 PROBLEM — I73.9 PERIPHERAL ARTERIAL DISEASE: Status: ACTIVE | Noted: 2023-02-27

## 2023-02-27 LAB
GLUCOSE SERPL-MCNC: 109 MG/DL (ref 70–105)
GLUCOSE SERPL-MCNC: 134 MG/DL (ref 70–105)
GLUCOSE SERPL-MCNC: 232 MG/DL (ref 70–105)
GLUCOSE SERPL-MCNC: 263 MG/DL (ref 70–105)

## 2023-02-27 PROCEDURE — 99900035 HC TECH TIME PER 15 MIN (STAT)

## 2023-02-27 PROCEDURE — 97116 GAIT TRAINING THERAPY: CPT

## 2023-02-27 PROCEDURE — 99308 SBSQ NF CARE LOW MDM 20: CPT | Mod: ,,, | Performed by: HOSPITALIST

## 2023-02-27 PROCEDURE — 82962 GLUCOSE BLOOD TEST: CPT

## 2023-02-27 PROCEDURE — 27000988

## 2023-02-27 PROCEDURE — 97110 THERAPEUTIC EXERCISES: CPT | Mod: CQ

## 2023-02-27 PROCEDURE — 63600175 PHARM REV CODE 636 W HCPCS

## 2023-02-27 PROCEDURE — 27000221 HC OXYGEN, UP TO 24 HOURS

## 2023-02-27 PROCEDURE — 25000003 PHARM REV CODE 250: Performed by: HOSPITALIST

## 2023-02-27 PROCEDURE — 97530 THERAPEUTIC ACTIVITIES: CPT | Mod: CQ

## 2023-02-27 PROCEDURE — 25000003 PHARM REV CODE 250

## 2023-02-27 PROCEDURE — 99308 PR NURSING FAC CARE, SUBSEQ, MINOR COMPLIC: ICD-10-PCS | Mod: ,,, | Performed by: HOSPITALIST

## 2023-02-27 PROCEDURE — 11000004 HC SNF PRIVATE

## 2023-02-27 RX ADMIN — AMIODARONE HYDROCHLORIDE 200 MG: 200 TABLET ORAL at 08:02

## 2023-02-27 RX ADMIN — INSULIN ASPART 3 UNITS: 100 INJECTION, SOLUTION INTRAVENOUS; SUBCUTANEOUS at 06:02

## 2023-02-27 RX ADMIN — LEVOTHYROXINE SODIUM 25 MCG: 0.03 TABLET ORAL at 05:02

## 2023-02-27 RX ADMIN — OXYCODONE HYDROCHLORIDE 10 MG: 10 TABLET ORAL at 10:02

## 2023-02-27 RX ADMIN — ZINC OXIDE TOPICAL OINT.: at 08:02

## 2023-02-27 RX ADMIN — FAMOTIDINE 20 MG: 20 TABLET, FILM COATED ORAL at 08:02

## 2023-02-27 RX ADMIN — OXYCODONE HYDROCHLORIDE 10 MG: 10 TABLET ORAL at 03:02

## 2023-02-27 RX ADMIN — SENNOSIDES AND DOCUSATE SODIUM 1 TABLET: 8.6; 5 TABLET ORAL at 08:02

## 2023-02-27 RX ADMIN — OXYCODONE HYDROCHLORIDE 10 MG: 10 TABLET ORAL at 02:02

## 2023-02-27 RX ADMIN — ONDANSETRON 4 MG: 4 TABLET, ORALLY DISINTEGRATING ORAL at 09:02

## 2023-02-27 RX ADMIN — FUROSEMIDE 40 MG: 40 TABLET ORAL at 08:02

## 2023-02-27 RX ADMIN — ROPINIROLE 0.25 MG: 0.25 TABLET, FILM COATED ORAL at 08:02

## 2023-02-27 RX ADMIN — POLYETHYLENE GLYCOL 3350 17 G: 17 POWDER, FOR SOLUTION ORAL at 08:02

## 2023-02-27 RX ADMIN — PREDNISONE 5 MG: 5 TABLET ORAL at 08:02

## 2023-02-27 RX ADMIN — INSULIN ASPART 2 UNITS: 100 INJECTION, SOLUTION INTRAVENOUS; SUBCUTANEOUS at 11:02

## 2023-02-27 RX ADMIN — OXYCODONE HYDROCHLORIDE 10 MG: 10 TABLET ORAL at 08:02

## 2023-02-27 RX ADMIN — MUPIROCIN: 20 OINTMENT TOPICAL at 08:02

## 2023-02-27 NOTE — PROGRESS NOTES
[facility]  Hospital Medicine  Progress Note    Patient Name: Karina Rojas  MRN: [id number]  Patient Class: IP- Swing   Admission Date: 2/10/2023  Length of Stay: 17 days  Attending Physician: Jorge Buckley DO  Primary Care Provider: Thalia Bowie NP        Subjective:     Principal Problem:Status post hip surgery        HPI:  Patient is a 82-year-old ill-appearing  female who is admitted to [facility] for skilled nursing services status post right intratrochanteric for mole nail removal and subsequent right total hip replacement with ORIF.  Patient is here for physical and occupational therapy services.  Patient's surgical procedure took place [facility] in [place] on February 9, 2023 by Dr. Teofilo Lewis.      Overview/Hospital Course:  2/13 2000 Called by RICARDO Galdamez LPN that Ms Rojas is having N/V and  BP 81/40.  Ms Rojas reports she has had intermittent NV since surg last week.  She is able to take sips of PO fluids but continues to have nausea.  Ms Rojas states her BP is always low 100s/40-50s.  She denies pain.  She denies diarrhea.  Will treat with 250 ml bolus, Zofran and continue to monitor.     2/15 Nausea seems better today, not vomiting but just nauseated.  Did eat lunch. Pain seems controlled.      2/20 Overall seems better, however, she has now developed R foot drop with decreased dorsiflexion.  Otherwise nausea seems better    2/27 Doing good today, still with foot drop, but working with therapy with brace. Arterial US done showing severe PAD which is known.       Interval History: working with therapy, slowly improving.  Monitor progress of foot drop.     Review of Systems   Respiratory:  Negative for shortness of breath.    Cardiovascular:  Negative for chest pain.   Gastrointestinal:  Negative for abdominal pain, nausea and vomiting.   Musculoskeletal:  Positive for gait problem.   Neurological:  Positive  for weakness.   Psychiatric/Behavioral:  Negative for agitation and confusion.    All other systems reviewed and are negative.  Objective:     Vital Signs (Most Recent):  Temp: 97.5 °F (36.4 °C) (02/27/23 0803)  Pulse: (!) 58 (02/27/23 0803)  Resp: 18 (02/27/23 0854)  BP: (!) 159/49 (02/27/23 0803)  SpO2: 100 % (02/27/23 0803)   Vital Signs (24h Range):  Temp:  [97.5 °F (36.4 °C)-98.8 °F (37.1 °C)] 97.5 °F (36.4 °C)  Pulse:  [58-89] 58  Resp:  [17-20] 18  SpO2:  [97 %-100 %] 100 %  BP: (139-159)/(41-49) 159/49     Weight:  (unable to weight)  Body mass index is 22.86 kg/m².    Intake/Output Summary (Last 24 hours) at 2/27/2023 1055  Last data filed at 2/27/2023 0528  Gross per 24 hour   Intake 800 ml   Output 100 ml   Net 700 ml      Physical Exam  Vitals reviewed.   Constitutional:       General: She is not in acute distress.     Appearance: Normal appearance.   HENT:      Head: Normocephalic and atraumatic.   Eyes:      General: No scleral icterus.     Extraocular Movements: Extraocular movements intact.      Conjunctiva/sclera: Conjunctivae normal.      Pupils: Pupils are equal, round, and reactive to light.   Cardiovascular:      Rate and Rhythm: Normal rate and regular rhythm.      Heart sounds: No murmur heard.    No friction rub. No gallop.   Pulmonary:      Effort: Pulmonary effort is normal. No respiratory distress.      Breath sounds: Normal breath sounds. No wheezing or rales.   Abdominal:      General: Abdomen is flat. Bowel sounds are normal. There is no distension.      Palpations: Abdomen is soft.      Tenderness: There is no abdominal tenderness. There is no guarding.   Musculoskeletal:         General: No swelling.      Comments: Hip pain ok    Skin:     General: Skin is warm and dry.      Coloration: Skin is not jaundiced.      Findings: No rash.   Neurological:      General: No focal deficit present.      Mental Status: She is alert and oriented to person, place, and time.      Sensory: No sensory  deficit.      Motor: Weakness present.   Psychiatric:         Mood and Affect: Mood normal.         Behavior: Behavior normal.         Thought Content: Thought content normal.       Significant Labs: All pertinent labs within the past 24 hours have been reviewed.  Recent Lab Results         02/27/23  0528   02/26/23  2035 02/26/23  1615        POC Glucose 134   170   243               Significant Imaging: I have reviewed all pertinent imaging results/findings within the past 24 hours.      Assessment/Plan:      * Status post hip surgery  Monitor pain and progress with therapy       Peripheral arterial disease  Known to patient.  Followed by Vascular.       Foot drop, right foot    She has extensive back issues and neurological issues, consider MRI.  Therapy working on Dorsiflexion.  May need boot.     Nausea  Better with zofran.  Monitor PO intake.       Chronic pain syndrome  Monitor while on pain meds.  Will not escalate at this time.       Diabetes  Patient's FSGs are controlled on current medication regimen.  Last A1c reviewed-   Lab Results   Component Value Date    HGBA1C 6.7 (H) 02/06/2023     Most recent fingerstick glucose reviewed- No results for input(s): POCTGLUCOSE in the last 24 hours.  Current correctional scale  Low  Maintain anti-hyperglycemic dose as follows-   Antihyperglycemics (From admission, onward)    Start     Stop Route Frequency Ordered    02/11/23 0745  metFORMIN tablet 1,000 mg         -- Oral With breakfast 02/10/23 1317    02/10/23 1514  insulin aspart U-100 injection 0-5 Units         -- SubQ Before meals & nightly PRN 02/10/23 1415        Hold Oral hypoglycemics while patient is in the hospital.    Atrial fibrillation  Patient with Long standing persistent (>12 months) atrial fibrillation which is controlled currently with Beta Blocker. Patient is currently in sinus rhythm.[id number] Score: 4. HASBLED Score: . Anticoagulation         HTN (hypertension)  Continue home meds and  adjust as needed.         VTE Risk Mitigation (From admission, onward)         Ordered     warfarin (COUMADIN) tablet 4 mg  Every Tues, Thurs, Sat, Sun         02/10/23 1317     IP VTE HIGH RISK PATIENT  Once         02/10/23 1311     Place sequential compression device  Until discontinued         02/10/23 1311                Discharge Planning   CHANTAL:      Code Status: Full Code   Is the patient medically ready for discharge?:     Reason for patient still in hospital (select all that apply): Patient trending condition and PT / OT recommendations                     Jorge Buckley DO  Department of Hospital Medicine   [facility]

## 2023-02-27 NOTE — NURSING
Nurses Note -- 4 Eyes      2/26/2023   10:49 PM      Skin assessed during: Q Shift Change      [] No Pressure Injuries Present    []Prevention Measures Documented      [] Yes- Altered Skin Integrity Present or Discovered   [] LDA Added if Not in Epic (Describe Wound)   [x] New Altered Skin Integrity was Present on Admit and Documented in LDA   [] Wound Image Taken    Wound Care Consulted? Yes    Attending Nurse:  Lisa Davidson RN     Second RN/Staff Member:  Rosangela Carter RN

## 2023-02-27 NOTE — PT/OT/SLP PROGRESS
Physical Therapy Treatment    Patient Name:  Karina Rojas   MRN:  [id number]    Recommendations:     Discharge Recommendations: home with home health  Discharge Equipment Recommendations: none  Barriers to discharge: Inaccessible home and Decreased caregiver support    Assessment:     Karina Rojas is a 82 y.o. female admitted with a medical diagnosis of Status post hip surgery.  She presents with the following impairments/functional limitations: weakness, impaired endurance, gait instability, impaired functional mobility, impaired balance, decreased lower extremity function, orthopedic precautions. PTA attempted to encourage patient to sit up in wc this morning, and patient was agreeable at first. So, PTA put elevating leg rests on to elevated patient's BLEs in wc, but patient stated that she was uncomfortable so she was returned to bed. Nursing notified of patient reports. Patient stated that she had increased back pain after completion of treatment.   Rehab Prognosis: Fair; patient would benefit from acute skilled PT services to address these deficits and reach maximum level of function.    Recent Surgery: * No surgery found *      Plan:     During this hospitalization, patient to be seen BID to address the identified rehab impairments via gait training, therapeutic activities, therapeutic exercises, wheelchair management/training and progress toward the following goals:    Plan of Care Expires:  03/24/23    Subjective     Chief Complaint: Back pain  Patient/Family Comments/goals: DC home when medically stable.  Pain/Comfort:  Pain Rating 1: 5/10  Location - Side 1: Bilateral  Location - Orientation 1: generalized  Location 1: back  Pain Addressed 1: Pre-medicate for activity      Objective:     Communicated with pt prior to session. Patient found HOB elevated with oxygen upon PTA entry to room.     General Precautions: Standard, fall  Orthopedic Precautions: RLE weight bearing as tolerated, RLE posterior  precautions (No active R hip ABD)  Braces:    Respiratory Status: Nasal cannula, flow 3 L/min     Functional Mobility:  Bed Mobility:     Supine to Sit: stand by assistance  Sit to Supine: minimum assistance  Transfers:     Sit to Stand:  CGA for first stand from bed, but min/mod A for second stand from wc with rolling walker  Bed to Chair: contact guard assistance and minimum assistance with  rolling walker  using  Step Transfer  Gait: Patient ambulated 2 feet from bed to chair, and 2 feet from chair to bed with RW and CGA/Min A.       AM-PAC 6 CLICK MOBILITY  Turning over in bed (including adjusting bedclothes, sheets and blankets)?: 4  Sitting down on and standing up from a chair with arms (e.g., wheelchair, bedside commode, etc.): 3  Moving from lying on back to sitting on the side of the bed?: 4  Moving to and from a bed to a chair (including a wheelchair)?: 3  Need to walk in hospital room?: 3  Climbing 3-5 steps with a railing?: 1  Basic Mobility Total Score: 18       Treatment & Education:  LAQs BLE 20x, seated marching LLE 20x, Hip ADD squeezes 20x, seated heel raises 20x, glute sets 20x    Patient left HOB elevated with heels floating and all lines intact, call button in reach, and CNA notified.    GOALS:   Multidisciplinary Problems       Physical Therapy Goals          Problem: Physical Therapy    Goal Priority Disciplines Outcome Goal Variances Interventions   Physical Therapy Goal     PT, PT/OT Ongoing, Progressing     Description: STG - 3 weeks  1. Pt min A x 1 with bed mob.-MET  2. Pt min A x 1  with transfers using RW.-MET  3. Pt will amb 25 ft with min A x 1 using RW.-PROGRESSING  4. Pt will have 3/5 RLE strength.-PROGRESSING  5. Pt will recall 3/3 SHYANNE precautions.-NOT MET    LTG - 6 weeks  1. Pt SBA with bed mob.  2. Pt SBA with transfers using RW.  3. Pt will amb 50 ft with SBA using RW.  4. Pt will have 4-/5 RLE strength.                       Time Tracking:     PT Received On: 02/27/23  PT  Start Time: 1012     PT Stop Time: 1052  PT Total Time (min): 40 min     Billable Minutes: Therapeutic Activity 25 and Therapeutic Exercise 15    Treatment Type: Treatment  PT/PTA: PTA     PTA Visit Number: 2 02/27/2023

## 2023-02-27 NOTE — PLAN OF CARE
Problem: Physical Therapy  Goal: Physical Therapy Goal  Description: STG - 3 weeks  1. Pt min A x 1 with bed mob.-MET  2. Pt min A x 1  with transfers using RW.-MET  3. Pt will amb 25 ft with min A x 1 using RW.-PROGRESSING  4. Pt will have 3/5 RLE strength.-PROGRESSING  5. Pt will recall 3/3 SHYANNE precautions.-MET    LTG - 6 weeks  1. Pt SBA with bed mob.  2. Pt SBA with transfers using RW.  3. Pt will amb 50 ft with SBA using RW.  4. Pt will have 4-/5 RLE strength.  Outcome: Ongoing, Progressing

## 2023-02-27 NOTE — PLAN OF CARE
Problem: Adult Inpatient Plan of Care  Goal: Readiness for Transition of Care  Outcome: Ongoing, Progressing   Patient will be able to sit up in chair for 2 hours a day with out getting tired.

## 2023-02-27 NOTE — PT/OT/SLP PROGRESS
Physical Therapy Treatment    Patient Name:  Karina Rojas   MRN:  [id number]    Recommendations:     Discharge Recommendations: home with home health  Discharge Equipment Recommendations: none  Barriers to discharge: Inaccessible home and Decreased caregiver support    Assessment:     Karina Rojas is a 82 y.o. female admitted with a medical diagnosis of Status post hip surgery.  She presents with the following impairments/functional limitations: weakness, impaired endurance, impaired functional mobility, gait instability, impaired balance, decreased lower extremity function, pain, impaired cardiopulmonary response to activity, orthopedic precautions, other (comment) (aleah heel decubes, left foot drop) .    Pt now using left foot drop orthosis that her son purchased which only partially helps. Pt would benefit from a more rigid AFO. US, per nsg, of BLE showed DVTs aleah. Pt has f/u with ortho surgeon 3/1/23 at 1230. PT to decrease pt from BID sessions to daily as pt does not seem to be benefitting from BID and has only fair to poor rehab potential. She cont's to have left foot drop which has not changed. She cont's to have aleah posterior decube ulcers and is limited by resp condition.    Rehab Prognosis: Fair and Poor; patient would benefit from acute skilled PT services to address these deficits and reach maximum level of function.    Recent Surgery: * No surgery found *      Plan:     During this hospitalization, patient to be seen DAILY to address the identified rehab impairments via gait training, therapeutic activities, therapeutic exercises and progress toward the following goals:    Plan of Care Expires:  03/24/23    Subjective     Chief Complaint: Pt reports have f/u with ortho surgery Wed 3/1/23.   Patient/Family Comments/goals: Pt to dc home with family and home health when stablized.  Pain/Comfort:  Pain Rating 1: 5/10  Location - Side 1: Bilateral  Location - Orientation 1: midline  Location 1:  back      Objective:     Communicated with Kina RN prior to session. Ultrasound results show DVTs in aleah lower legs. Patient found HOB elevated with oxygen upon PT entry to room.     General Precautions: Standard, fall  Orthopedic Precautions: RLE weight bearing as tolerated, RLE posterior precautions (left foot drop. No AROM right hip abd.)  Braces:  (Left foot drop brace)  Respiratory Status: Nasal cannula, flow 3 L/min     Functional Mobility:  Bed Mobility:     Rolling Left:  modified independence  Scooting: supervision  Bridging: minimum assistance  Supine to Sit: stand by assistance  Sit to Supine: minimum assistance  Transfers:     Sit to Stand:  minimum assistance with rolling walker  Bed to Chair: minimum assistance with  rolling walker  using  Stand Pivot  Gait: Pt amb'd with RW/wc trail/O2/left foot drop orthosis x 10 feet with min Ax1.      AM-PAC 6 CLICK MOBILITY  Turning over in bed (including adjusting bedclothes, sheets and blankets)?: 4  Sitting down on and standing up from a chair with arms (e.g., wheelchair, bedside commode, etc.): 3  Moving from lying on back to sitting on the side of the bed?: 4  Moving to and from a bed to a chair (including a wheelchair)?: 3  Need to walk in hospital room?: 3  Climbing 3-5 steps with a railing?: 1  Basic Mobility Total Score: 18       Treatment & Education:  Left foot drop orthosis donned. Pt amb'd fwd x 10 ft as stated above. Pt stood up from wc and performed SPT wc to bed. Pt sat on EOB and performed LAQ 2# X 15 reps aleah f/b hip flex x 15 on RLE while leaning bwd. Pt was transferred to supine and left foot orthosis doffed.Supine ther ex RLE: bridging x 15, SLR X 10, resisted hip add using pillow x 15. Pt positioned with 2 pillows under calves and heels floating. Pt recalled 3/3 SHYANNE px's.    Patient left HOB elevated with all lines intact..    GOALS:   Multidisciplinary Problems       Physical Therapy Goals          Problem: Physical Therapy    Goal  Priority Disciplines Outcome Goal Variances Interventions   Physical Therapy Goal     PT, PT/OT Ongoing, Progressing     Description: STG - 3 weeks  1. Pt min A x 1 with bed mob.-MET  2. Pt min A x 1  with transfers using RW.-MET  3. Pt will amb 25 ft with min A x 1 using RW.-PROGRESSING  4. Pt will have 3/5 RLE strength.-PROGRESSING  5. Pt will recall 3/3 SHYANNE precautions.-NOT MET    LTG - 6 weeks  1. Pt SBA with bed mob.  2. Pt SBA with transfers using RW.  3. Pt will amb 50 ft with SBA using RW.  4. Pt will have 4-/5 RLE strength.                       Time Tracking:     PT Received On: 02/27/23  PT Start Time: 1510     PT Stop Time: 1554  PT Total Time (min): 44 min     Billable Minutes: Gait Training 8, Therapeutic Activity 14, Therapeutic Exercise 22, and Total Time 44 min    Treatment Type: Treatment  PT/PTA: PT     PTA Visit Number: 0     02/27/2023

## 2023-02-27 NOTE — PLAN OF CARE
Problem: Adult Inpatient Plan of Care  Goal: Plan of Care Review  Outcome: Ongoing, Progressing  Goal: Patient-Specific Goal (Individualized)  Outcome: Ongoing, Progressing  Goal: Absence of Hospital-Acquired Illness or Injury  Outcome: Ongoing, Progressing  Goal: Optimal Comfort and Wellbeing  Outcome: Ongoing, Progressing  Goal: Readiness for Transition of Care  Outcome: Ongoing, Progressing     Problem: Diabetes Comorbidity  Goal: Blood Glucose Level Within Targeted Range  Outcome: Ongoing, Progressing     Problem: Fall Injury Risk  Goal: Absence of Fall and Fall-Related Injury  Outcome: Ongoing, Progressing     Problem: Pain Acute  Goal: Acceptable Pain Control and Functional Ability  Outcome: Ongoing, Progressing     Problem: Impaired Wound Healing  Goal: Optimal Wound Healing  Outcome: Ongoing, Progressing     Problem: Skin Injury Risk Increased  Goal: Skin Health and Integrity  Outcome: Ongoing, Progressing  Intervention: Optimize Skin Protection  Flowsheets (Taken 2/27/2023 2857)  Pressure Reduction Techniques:   heels elevated off bed   positioned off wounds   weight shift assistance provided   frequent weight shift encouraged

## 2023-02-27 NOTE — SUBJECTIVE & OBJECTIVE
Interval History: working with therapy, slowly improving.  Monitor progress of foot drop.     Review of Systems   Respiratory:  Negative for shortness of breath.    Cardiovascular:  Negative for chest pain.   Gastrointestinal:  Negative for abdominal pain, nausea and vomiting.   Musculoskeletal:  Positive for gait problem.   Neurological:  Positive for weakness.   Psychiatric/Behavioral:  Negative for agitation and confusion.    All other systems reviewed and are negative.  Objective:     Vital Signs (Most Recent):  Temp: 97.5 °F (36.4 °C) (02/27/23 0803)  Pulse: (!) 58 (02/27/23 0803)  Resp: 18 (02/27/23 0854)  BP: (!) 159/49 (02/27/23 0803)  SpO2: 100 % (02/27/23 0803)   Vital Signs (24h Range):  Temp:  [97.5 °F (36.4 °C)-98.8 °F (37.1 °C)] 97.5 °F (36.4 °C)  Pulse:  [58-89] 58  Resp:  [17-20] 18  SpO2:  [97 %-100 %] 100 %  BP: (139-159)/(41-49) 159/49     Weight:  (unable to weight)  Body mass index is 22.86 kg/m².    Intake/Output Summary (Last 24 hours) at 2/27/2023 1055  Last data filed at 2/27/2023 0528  Gross per 24 hour   Intake 800 ml   Output 100 ml   Net 700 ml      Physical Exam  Vitals reviewed.   Constitutional:       General: She is not in acute distress.     Appearance: Normal appearance.   HENT:      Head: Normocephalic and atraumatic.   Eyes:      General: No scleral icterus.     Extraocular Movements: Extraocular movements intact.      Conjunctiva/sclera: Conjunctivae normal.      Pupils: Pupils are equal, round, and reactive to light.   Cardiovascular:      Rate and Rhythm: Normal rate and regular rhythm.      Heart sounds: No murmur heard.    No friction rub. No gallop.   Pulmonary:      Effort: Pulmonary effort is normal. No respiratory distress.      Breath sounds: Normal breath sounds. No wheezing or rales.   Abdominal:      General: Abdomen is flat. Bowel sounds are normal. There is no distension.      Palpations: Abdomen is soft.      Tenderness: There is no abdominal tenderness. There is  no guarding.   Musculoskeletal:         General: No swelling.      Comments: Hip pain ok    Skin:     General: Skin is warm and dry.      Coloration: Skin is not jaundiced.      Findings: No rash.   Neurological:      General: No focal deficit present.      Mental Status: She is alert and oriented to person, place, and time.      Sensory: No sensory deficit.      Motor: Weakness present.   Psychiatric:         Mood and Affect: Mood normal.         Behavior: Behavior normal.         Thought Content: Thought content normal.       Significant Labs: All pertinent labs within the past 24 hours have been reviewed.  Recent Lab Results         02/27/23  0528   02/26/23  2035   02/26/23  1615        POC Glucose 134   170   243               Significant Imaging: I have reviewed all pertinent imaging results/findings within the past 24 hours.

## 2023-02-28 LAB
GLUCOSE SERPL-MCNC: 116 MG/DL (ref 70–105)
GLUCOSE SERPL-MCNC: 156 MG/DL (ref 70–105)
GLUCOSE SERPL-MCNC: 191 MG/DL (ref 70–105)
GLUCOSE SERPL-MCNC: 202 MG/DL (ref 70–105)

## 2023-02-28 PROCEDURE — 11000004 HC SNF PRIVATE

## 2023-02-28 PROCEDURE — 82962 GLUCOSE BLOOD TEST: CPT

## 2023-02-28 PROCEDURE — 27000958

## 2023-02-28 PROCEDURE — 27000988

## 2023-02-28 PROCEDURE — 27000221 HC OXYGEN, UP TO 24 HOURS

## 2023-02-28 PROCEDURE — 63600175 PHARM REV CODE 636 W HCPCS

## 2023-02-28 PROCEDURE — 25000003 PHARM REV CODE 250

## 2023-02-28 PROCEDURE — 25000003 PHARM REV CODE 250: Performed by: HOSPITALIST

## 2023-02-28 RX ADMIN — PREDNISONE 5 MG: 5 TABLET ORAL at 08:02

## 2023-02-28 RX ADMIN — OXYCODONE HYDROCHLORIDE 10 MG: 10 TABLET ORAL at 06:02

## 2023-02-28 RX ADMIN — ONDANSETRON 4 MG: 4 TABLET, ORALLY DISINTEGRATING ORAL at 08:02

## 2023-02-28 RX ADMIN — FAMOTIDINE 20 MG: 20 TABLET, FILM COATED ORAL at 08:02

## 2023-02-28 RX ADMIN — ROPINIROLE 0.25 MG: 0.25 TABLET, FILM COATED ORAL at 08:02

## 2023-02-28 RX ADMIN — ZINC OXIDE TOPICAL OINT.: at 08:02

## 2023-02-28 RX ADMIN — INSULIN ASPART 2 UNITS: 100 INJECTION, SOLUTION INTRAVENOUS; SUBCUTANEOUS at 10:02

## 2023-02-28 RX ADMIN — WARFARIN SODIUM 4 MG: 1 TABLET ORAL at 04:02

## 2023-02-28 RX ADMIN — OXYCODONE HYDROCHLORIDE 10 MG: 10 TABLET ORAL at 11:02

## 2023-02-28 RX ADMIN — ROPINIROLE 0.25 MG: 0.25 TABLET, FILM COATED ORAL at 09:02

## 2023-02-28 RX ADMIN — FUROSEMIDE 40 MG: 40 TABLET ORAL at 08:02

## 2023-02-28 RX ADMIN — OXYCODONE HYDROCHLORIDE 10 MG: 10 TABLET ORAL at 04:02

## 2023-02-28 RX ADMIN — ATORVASTATIN CALCIUM 40 MG: 40 TABLET, FILM COATED ORAL at 09:02

## 2023-02-28 RX ADMIN — SENNOSIDES AND DOCUSATE SODIUM 1 TABLET: 8.6; 5 TABLET ORAL at 08:02

## 2023-02-28 RX ADMIN — PROCHLORPERAZINE EDISYLATE 5 MG: 5 INJECTION INTRAMUSCULAR; INTRAVENOUS at 04:02

## 2023-02-28 RX ADMIN — MELATONIN 6 MG: at 09:02

## 2023-02-28 RX ADMIN — AMIODARONE HYDROCHLORIDE 200 MG: 200 TABLET ORAL at 09:02

## 2023-02-28 RX ADMIN — POLYETHYLENE GLYCOL 3350 17 G: 17 POWDER, FOR SOLUTION ORAL at 08:02

## 2023-02-28 RX ADMIN — MUPIROCIN: 20 OINTMENT TOPICAL at 08:02

## 2023-02-28 RX ADMIN — LEVOTHYROXINE SODIUM 25 MCG: 0.03 TABLET ORAL at 06:02

## 2023-02-28 NOTE — PROGRESS NOTES
Patient requested to withhold PT treatment this date due to reports of nausea. Will follow up tomorrow.

## 2023-02-28 NOTE — PROGRESS NOTES
"[facility] - Medical Surgical Unit  Adult Nutrition  Follow-Up         Reason for Assessment  Reason For Assessment: RD follow-up  Nutrition Risk Screen: large or nonhealing wound, burn or pressure injury    RD follow up note.     Recommend continue current Diabetic diet order as medically appropriate and tolerated. Recommend continue Glucerna oral nutrition supplement TID to promote adequate oral intake. Encourage good PO intakes.    Per MD note:  "HPI: Patient is a 82-year-old ill-appearing  female who is admitted to [facility] for skilled nursing services status post right intratrochanteric for mole nail removal and subsequent right total hip replacement with ORIF.  Patient is here for physical and occupational therapy services.  Patient's surgical procedure took place [facility] in [place] on February 9, 2023 by Dr. Teofilo Lewis."    Overview/Hospital Course:  2/13 2000 Called by RICARDO Galdamez LPN that Ms Rojas is having N/V and  BP 81/40.  Ms Rojas reports she has had intermittent NV since surg last week.  She is able to take sips of PO fluids but continues to have nausea.  Ms Rojas states her BP is always low 100s/40-50s.  She denies pain.  She denies diarrhea.  Will treat with 250 ml bolus, Zofran and continue to monitor.      2/15 Nausea seems better today, not vomiting but just nauseated.  Did eat lunch. Pain seems controlled.       2/20 Overall seems better, however, she has now developed R foot drop with decreased dorsiflexion.  Otherwise nausea seems better        2/28:Patient currently receiving Diabetic Diet with 25% PO intake documented. PO intake inadequate to meet nutritional needs. She is receiving Glucerna with meals and accepting them @ %.     Recommend continue current diet order as medically appropriate and tolerated.    Glucerna provides 220kcal, 10g protein each. Encourage good PO intakes.    Patient CBW and BMI are within IBW " range and WNL. Will continue to monitor weight trends.     Last BM 2/20 per flowsheets - pt receiving senna-docusate PRN per med list. Will continue to monitor PO intakes, weight trend, meds, labs, updates in patient condition. RD following.    2/28: Last BM 2/28 per flowsheets. Intake seems to be improved, ranging from 25% to 100%. Appetite previously affected by nausea; although, nausea now with improvement with zofran. No new weights.    Malnutrition  Is Patient Malnourished: No    Nutrition Diagnosis  Altered Nutrition Related Lab Values   related to Diabetes Mellitus as evidenced by pt with elevated GLU    Nutrition Diagnosis Status: Chronic/ continues    Nutrition Risk  Level of Risk/Frequency of Follow-up: moderate - high   Chewing or Swallowing Difficulty?: No Chewing or swallowing difficulty    Estimated/Assessed Needs    Temp: 97.8 °F (36.6 °C)Oral  Weight Used For Calorie Calculations: 66.2 kg (145 lb 15.1 oz)   Energy Need Method: Kcal/kg (25-30kcal/kg) Energy Calorie Requirements (kcal): 1655-1986kcal  Weight Used For Protein Calculations: 66.2 kg (145 lb 15.1 oz)  Protein Requirements: 66-80g pro (1.0-1.2g pro/kg)       RDA Method (mL): 1655     Nutrition Prescription / Recommendations  Recommendation/Intervention: Recommend continue current Diabetic diet order as medically appropriate and tolerated. Recommend continuing Glucerna oral nutrition supplement TID to promote adequate oral intake. Encourage good PO intakes.  Goals: PO intakes improve, weight maintenance  Nutrition Goal Status: progressing towards goal  Current Diet Order: Diabetic Diet  Nutrition Order Comments: Appropriate  Recommended Diet: Consistent Carbohydrate 1800 (60g Carbs)  Recommended Oral Supplement: Glucerna Shake [220 kcals, 10g Protein, 26g Carbs(4g Fiber, 7g Sugar), 9g Fat] three times daily  Is Nutrition Support Recommended: No  Is Education Recommended: No    Monitor and Evaluation  % current Intake: Other: PO intake  "%  % intake to meet estimated needs: 75 - 100 %  Food and Nutrient Intake: food and beverage intake, energy intake  Food and Nutrient Adminstration: diet order  Anthropometric Measurements: weight, weight change, body mass index  Biochemical Data, Medical Tests and Procedures: electrolyte and renal panel, gastrointestinal profile, glucose/endocrine profile, inflammatory profile, lipid profile  Nutrition-Focused Physical Findings: overall appearance, extremities, muscles and bones, head and eyes, skin     Current Medical Diagnosis and Past Medical History  Diagnosis: other (see comments) (s/p hip surgery)  Past Medical History:   Diagnosis Date    Carotid artery stenosis     Chronic respiratory failure with hypoxia     COPD (chronic obstructive pulmonary disease)     Diabetes mellitus     DVT (deep venous thrombosis)     Heart failure     High cholesterol     Hypertension     Hypothyroidism     Paroxysmal atrial fibrillation      Nutrition/Diet History  Spiritual, Cultural Beliefs, Jehovah's witness Practices, Values that Affect Care: no  Food Allergies: NKFA  Factors Affecting Nutritional Intake: nausea/vomiting - improved with zofran    Lab/Procedures/Meds  No results for input(s): NA, K, BUN, CREATININE, GLU, CALCIUM, ALBUMIN, CL, ALT, AST, PHOS in the last 72 hours.  Last A1c:   Lab Results   Component Value Date    HGBA1C 6.7 (H) 02/06/2023     Lab Results   Component Value Date    RBC 3.28 (L) 02/24/2023    HGB 9.0 (L) 02/24/2023    HCT 31.5 (L) 02/24/2023    MCV 96.0 02/24/2023    MCH 27.4 02/24/2023    MCHC 28.6 (L) 02/24/2023     Pertinent Labs Reviewed: reviewed    Pertinent Medications Reviewed: reviewed  Amiodarone, atorvastatin, digoxin, famotidine, furosemide, levothyroxine, ropinirole, coumadin, oxycodone PRN, senna-docusate PRN    Anthropometrics  Temp: 97.8 °F (36.6 °C)  Height Method: Estimated  Height: 5' 7" (170.2 cm)  Height (inches): 67 in  Weight Method: Standard Scale  Weight:  (unable to " weight)  Weight (lb): 145.95 lb  Ideal Body Weight (IBW), Female: 135 lb  % Ideal Body Weight, Female (lb): 108.11 %  BMI (Calculated): 22.9     Nutrition by Nursing  Diet/Nutrition Received: consistent carb/diabetic diet  Intake (%): 25%  Diet/Feeding Assistance: tray set-up  Diet/Feeding Tolerance: fair  Last Bowel Movement: 02/28/23    Nutrition Follow-Up  RD Follow-up?: Yes    Elizabeth Ramirez, MS, RD, LD

## 2023-02-28 NOTE — PLAN OF CARE
[facility] - [facility] - Skilled Nursing Facility  Patient: Karina Rojas     Interdisciplinary Team Meeting     Today's Date: 2/28/2023     Estimated D/C Date: 3/3/23       Primary Physician:  Thalia Bowie    [facility] Director: Sowmya Valdez   Care Management: Andres Middleton Physical/Occupational Therapy: Charlotte Lizarraga/So Alford   Speech Therapy: Yanni Richard Activity Therapy: Angelica Araujo    Dietician: Elizabeth LEVINE  Pharmacist: Mike Nunez  Respiratory therapist: Tere Adame     Nurse  New Symptoms/Problems: none  Last BM: 2/26/2023 Urine: continent Diarrhea: No   Constipated: No Bowel: continent Davidson: no   Isolation: No     O2: 2L NC     Nutrition: diabetic diet with Glucerna supplements  Speech/Swallowing: not following  Aspiration Precautions: No  Cognition: alert and oriented to person, place time and situations    Physical Therapy  Gait: 10ft with RW/WC ELOS: TBD   Transfers: min assist Range of Motion/Restrictions: L foot drop     Occupational Therapy   Eating/Grooming: independent   Toileting: independent Bathing: independent   Dressing (Upper Body): independent Dressing (Lower Body): independent       Tx Plan/Recommendations reviewed with family and patient on (date) 2/28 by phone.  Additional family Conference/Training: n/a  D/C Plan/Recommendations: home with home health    Pharmacy  Medication Changes (see MD orders in chart): No  MD/NP: Jorge Buckley Labs Reviewed: 2/28 New Lab Orders: none     MD/NP Signature: Time:

## 2023-03-01 LAB
GLUCOSE SERPL-MCNC: 130 MG/DL (ref 70–105)
GLUCOSE SERPL-MCNC: 136 MG/DL (ref 70–105)
GLUCOSE SERPL-MCNC: 247 MG/DL (ref 70–105)

## 2023-03-01 PROCEDURE — 82962 GLUCOSE BLOOD TEST: CPT

## 2023-03-01 PROCEDURE — 99900035 HC TECH TIME PER 15 MIN (STAT)

## 2023-03-01 PROCEDURE — 63600175 PHARM REV CODE 636 W HCPCS

## 2023-03-01 PROCEDURE — 25000003 PHARM REV CODE 250

## 2023-03-01 PROCEDURE — 11000004 HC SNF PRIVATE

## 2023-03-01 PROCEDURE — 27000988

## 2023-03-01 PROCEDURE — 97116 GAIT TRAINING THERAPY: CPT

## 2023-03-01 PROCEDURE — 25000003 PHARM REV CODE 250: Performed by: HOSPITALIST

## 2023-03-01 PROCEDURE — 27000221 HC OXYGEN, UP TO 24 HOURS

## 2023-03-01 RX ADMIN — MELATONIN 6 MG: at 08:03

## 2023-03-01 RX ADMIN — PREDNISONE 5 MG: 5 TABLET ORAL at 08:03

## 2023-03-01 RX ADMIN — ROPINIROLE 0.25 MG: 0.25 TABLET, FILM COATED ORAL at 08:03

## 2023-03-01 RX ADMIN — AMIODARONE HYDROCHLORIDE 200 MG: 200 TABLET ORAL at 08:03

## 2023-03-01 RX ADMIN — LEVOTHYROXINE SODIUM 25 MCG: 0.03 TABLET ORAL at 05:03

## 2023-03-01 RX ADMIN — OXYCODONE HYDROCHLORIDE 10 MG: 10 TABLET ORAL at 11:03

## 2023-03-01 RX ADMIN — ZINC OXIDE TOPICAL OINT.: at 08:03

## 2023-03-01 RX ADMIN — FAMOTIDINE 20 MG: 20 TABLET, FILM COATED ORAL at 08:03

## 2023-03-01 RX ADMIN — INSULIN ASPART 2 UNITS: 100 INJECTION, SOLUTION INTRAVENOUS; SUBCUTANEOUS at 05:03

## 2023-03-01 RX ADMIN — ATORVASTATIN CALCIUM 40 MG: 40 TABLET, FILM COATED ORAL at 08:03

## 2023-03-01 RX ADMIN — MUPIROCIN: 20 OINTMENT TOPICAL at 08:03

## 2023-03-01 RX ADMIN — OXYCODONE HYDROCHLORIDE 10 MG: 10 TABLET ORAL at 05:03

## 2023-03-01 NOTE — PROGRESS NOTES
Clinical Pharmacy Chart Review Note      Admit Date: 2/10/2023   LOS: 19 days       Karina Rojas is a 82 y.o. female admitted to [facility] for PT/OT after hospitalization for right total hip replacement.    Active Hospital Problems    Diagnosis  POA    *Status post hip surgery [Z98.890]  Not Applicable    Peripheral arterial disease [I73.9]  Yes    Foot drop, right foot [M21.371]  No            Nausea [R11.0]  No    Chronic pain syndrome [G89.4]  Yes    Atrial fibrillation [I48.91]  Yes    Diabetes [E11.9]  Yes    HTN (hypertension) [I10]  Yes      Resolved Hospital Problems   No resolved problems to display.     Review of patient's allergies indicates:   Allergen Reactions    Tylenol [acetaminophen] Anaphylaxis and Shortness Of Breath     Patient Active Problem List    Diagnosis Date Noted    Peripheral arterial disease 02/27/2023    Foot drop, right foot 02/20/2023    Nausea 02/15/2023    Status post hip surgery 02/13/2023    Chronic pain syndrome 02/13/2023    HTN (hypertension) 01/04/2023    Atrial fibrillation 01/04/2023    Diabetes 01/04/2023       Scheduled Meds:    amiodarone  200 mg Oral BID    atorvastatin  40 mg Oral QHS    digoxin  0.125 mg Oral Daily    famotidine  20 mg Oral Daily    furosemide  40 mg Oral Daily    levothyroxine  25 mcg Oral Before breakfast    mupirocin   Topical (Top) Daily    polyethylene glycol  17 g Oral Daily    predniSONE  5 mg Oral Daily    rOPINIRole  0.25 mg Oral BID    warfarin  4 mg Oral Every Tues, Thurs, Sat, Sun    zinc oxide   Topical (Top) Daily     Continuous Infusions:   PRN Meds: bisacodyL, calcium carbonate, dextrose 50%, dextrose 50%, glucagon (human recombinant), glucose, glucose, insulin aspart U-100, magnesium hydroxide 400 mg/5 ml, melatonin, ondansetron, oxyCODONE, prochlorperazine, senna-docusate 8.6-50 mg    OBJECTIVE:     Vital Signs (Last 24H)  Temp:  [98 °F (36.7 °C)-98.4 °F (36.9 °C)]   Pulse:  [57-66]   Resp:  [17-18]   BP: (111-148)/(42-56)   SpO2:  [96  %-99 %]     Laboratory:  CBC:   Recent Labs   Lab 02/24/23  0540   WBC 8.94   RBC 3.28*   HGB 9.0*   HCT 31.5*   *   MCV 96.0   MCH 27.4   MCHC 28.6*     BMP:   Recent Labs   Lab 02/24/23  0540         K 4.9      CO2 36*   BUN 16   CREATININE 0.75   CALCIUM 8.4*     Coagulation:   Recent Labs   Lab 02/24/23  0540   INR 2.23     .    ASSESSMENT/PLAN:     Estimated Creatinine Clearance: 58.2 mL/min (based on SCr of 0.75 mg/dL).Medications reviewed, no dose adjustments needed. INR 2.23, continue current regimen. K+ within normal limits.TSH 6.079 on 01-, dose adjusted at that time.  GI prophylaxis with famotidine, due to prednisone and warfarin tx.Weekly swing bed medication regiment review complete.  Will continue to monitor.  Mike Nunez, Pharm. D.  Director of Pharmacy  [facility]  [phone]  [email or web address]

## 2023-03-01 NOTE — PT/OT/SLP PROGRESS
Physical Therapy Treatment    Patient Name:  Karina Rojas   MRN:  [id number]    Recommendations:     Discharge Recommendations: home with home health  Discharge Equipment Recommendations: none  Barriers to discharge: Decreased caregiver support    Assessment:     Karina Rojas is a 82 y.o. female admitted with a medical diagnosis of Status post hip surgery.  She presents with the following impairments/functional limitations: weakness, impaired endurance, gait instability, impaired balance, decreased lower extremity function, pain, impaired cardiopulmonary response to activity, orthopedic precautions, other (comment) (left foot drop) .    Treatment cut short due to pt having drainage running down her leg and dropping onto the floor during amb'n. Nsg was notified and came to ten to pt's dsg which had been changed prior to PT arrival, but the drainage has loosened the tape at the distal portion. Pt is leaving the hosp at 1100 for f/u appt with hip surgeon. Pt's dtr states that the pt will not like/use a more rigid AFO.     Rehab Prognosis: Fair; patient would benefit from acute skilled PT services to address these deficits and reach maximum level of function.    Recent Surgery: * No surgery found *      Plan:     During this hospitalization, patient to be seen 5 x/week to address the identified rehab impairments via gait training, therapeutic activities, therapeutic exercises, wheelchair management/training and progress toward the following goals:    Plan of Care Expires:  03/24/23    Subjective     Chief Complaint: Dtr states pt just had dsg change on right hip incision. PT spoke with pt's dtr while pt was getting another dsg change and recommended pt have a more rigid AFO for left foot drop. The dtr stated that she didn't think the pt would like that or use one because she has become accustomed to the softer foot drop splint that her son purchased.  Patient/Family Comments/goals: Pt has f/u with hip surgeon this  afternoon.  Pain/Comfort:  Pain Rating 1: 5/10  Location - Side 1: Bilateral  Location - Orientation 1: midline  Location 1: back  Pain Addressed 2: Pre-medicate for activity      Objective:     Communicated with pt/pt's dtr prior to session. Communicated with Tree [facility]ard RT who states pt is supposed to be on 3L O2.  Patient found sitting edge of bed with oxygen upon PT entry to room.     General Precautions: Standard, fall  Orthopedic Precautions: RLE weight bearing as tolerated, RLE posterior precautions (No AROM right hip abd)  Braces:  (Left foot drop splint)  Respiratory Status: Nasal cannula, flow 3 L/min     Functional Mobility:  Bed Mobility:     Scooting: stand by assistance  Supine to Sit: contact guard assistance  Sit to Supine: minimum assistance  Transfers:     Sit to Stand:  contact guard assistance with rolling walker  Gait: Pt amb'd with RW/O2/wc trail/left foot drop splint x 20 ft including a 180 degree turn with CGA/SBA. Pt compensates for foot drop by increasing left hip flexion      AM-PAC 6 CLICK MOBILITY  Turning over in bed (including adjusting bedclothes, sheets and blankets)?: 4  Sitting down on and standing up from a chair with arms (e.g., wheelchair, bedside commode, etc.): 3  Moving from lying on back to sitting on the side of the bed?: 3  Moving to and from a bed to a chair (including a wheelchair)?: 3  Need to walk in hospital room?: 3  Climbing 3-5 steps with a railing?: 1  Basic Mobility Total Score: 17       Treatment & Education:  Pt found sitting on EOB and was transferred to supine to don left foot drop splint. Pt amb'd in the room x 20 ft including a 180 degree turn. She had thicker orange drainage eminating from a specific staple in distance third of incision. Drainage did not appear to be simply serous or serosanguinous. Nursing was called and came to change pt's dsg while she sat on EOB.     Patient left sitting edge of bed with all lines intact, SHAHRZAD Farris notified, and  funmi, RN present..    GOALS:   Multidisciplinary Problems       Physical Therapy Goals          Problem: Physical Therapy    Goal Priority Disciplines Outcome Goal Variances Interventions   Physical Therapy Goal     PT, PT/OT Ongoing, Progressing     Description: STG - 3 weeks  1. Pt min A x 1 with bed mob.-MET  2. Pt min A x 1  with transfers using RW.-MET  3. Pt will amb 25 ft with min A x 1 using RW.-PROGRESSING  4. Pt will have 3/5 RLE strength.-PROGRESSING  5. Pt will recall 3/3 SHYANNE precautions.-MET    LTG - 6 weeks  1. Pt SBA with bed mob.  2. Pt SBA with transfers using RW.  3. Pt will amb 50 ft with SBA using RW.  4. Pt will have 4-/5 RLE strength.                       Time Tracking:     PT Received On: 03/01/23  PT Start Time: 0936     PT Stop Time: 0947  PT Total Time (min): 11 min     Billable Minutes: Gait Training 8, Therapeutic Activity 3, and Total Time 11 min    Treatment Type: Treatment  PT/PTA: PT     PTA Visit Number: 0     03/01/2023

## 2023-03-01 NOTE — NURSING
Patient arrived back from doctor appointment. Son states that the doctor only took 1 staple out of hip and applied disposable wound vac in (picture in patient's chart), not to be removed until [facility] appointment on 3/8 @ 12:45. If any problems with it call [phone] and speak to nurse Irving. Family to set up appointment with [facility] for boot to help with the patient's drop foot.

## 2023-03-02 ENCOUNTER — CLINICAL SUPPORT (OUTPATIENT)
Dept: WOUND CARE | Facility: HOSPITAL | Age: 83
DRG: 561 | End: 2023-03-02
Attending: HOSPITALIST
Payer: MEDICARE

## 2023-03-02 DIAGNOSIS — L89.626 PRESSURE INJURY OF DEEP TISSUE OF LEFT HEEL: ICD-10-CM

## 2023-03-02 DIAGNOSIS — L97.528 NON-PRESSURE CHRONIC ULCER OF OTHER PART OF LEFT FOOT WITH OTHER SPECIFIED SEVERITY: ICD-10-CM

## 2023-03-02 DIAGNOSIS — I73.9 PAD (PERIPHERAL ARTERY DISEASE): ICD-10-CM

## 2023-03-02 DIAGNOSIS — L89.616 PRESSURE INJURY OF DEEP TISSUE OF RIGHT HEEL: Primary | ICD-10-CM

## 2023-03-02 LAB
GLUCOSE SERPL-MCNC: 151 MG/DL (ref 70–105)
GLUCOSE SERPL-MCNC: 202 MG/DL (ref 70–105)
GLUCOSE SERPL-MCNC: 216 MG/DL (ref 70–105)

## 2023-03-02 PROCEDURE — 11000004 HC SNF PRIVATE

## 2023-03-02 PROCEDURE — 27000221 HC OXYGEN, UP TO 24 HOURS

## 2023-03-02 PROCEDURE — 63600175 PHARM REV CODE 636 W HCPCS

## 2023-03-02 PROCEDURE — 99308 PR NURSING FAC CARE, SUBSEQ, MINOR COMPLIC: ICD-10-PCS | Mod: ,,, | Performed by: HOSPITALIST

## 2023-03-02 PROCEDURE — 25000003 PHARM REV CODE 250: Performed by: HOSPITALIST

## 2023-03-02 PROCEDURE — 97110 THERAPEUTIC EXERCISES: CPT | Mod: CQ

## 2023-03-02 PROCEDURE — 82962 GLUCOSE BLOOD TEST: CPT

## 2023-03-02 PROCEDURE — 99213 OFFICE O/P EST LOW 20 MIN: CPT

## 2023-03-02 PROCEDURE — 25000003 PHARM REV CODE 250

## 2023-03-02 PROCEDURE — 97116 GAIT TRAINING THERAPY: CPT | Mod: CQ

## 2023-03-02 PROCEDURE — 97530 THERAPEUTIC ACTIVITIES: CPT | Mod: CQ

## 2023-03-02 PROCEDURE — 99308 SBSQ NF CARE LOW MDM 20: CPT | Mod: ,,, | Performed by: HOSPITALIST

## 2023-03-02 PROCEDURE — 27000988

## 2023-03-02 RX ADMIN — POLYETHYLENE GLYCOL 3350 17 G: 17 POWDER, FOR SOLUTION ORAL at 08:03

## 2023-03-02 RX ADMIN — OXYCODONE HYDROCHLORIDE 10 MG: 10 TABLET ORAL at 11:03

## 2023-03-02 RX ADMIN — AMIODARONE HYDROCHLORIDE 200 MG: 200 TABLET ORAL at 08:03

## 2023-03-02 RX ADMIN — ROPINIROLE 0.25 MG: 0.25 TABLET, FILM COATED ORAL at 08:03

## 2023-03-02 RX ADMIN — PREDNISONE 5 MG: 5 TABLET ORAL at 08:03

## 2023-03-02 RX ADMIN — LEVOTHYROXINE SODIUM 25 MCG: 0.03 TABLET ORAL at 05:03

## 2023-03-02 RX ADMIN — WARFARIN SODIUM 4 MG: 1 TABLET ORAL at 05:03

## 2023-03-02 RX ADMIN — OXYCODONE HYDROCHLORIDE 10 MG: 10 TABLET ORAL at 04:03

## 2023-03-02 RX ADMIN — ZINC OXIDE TOPICAL OINT.: at 08:03

## 2023-03-02 RX ADMIN — FAMOTIDINE 20 MG: 20 TABLET, FILM COATED ORAL at 08:03

## 2023-03-02 RX ADMIN — FUROSEMIDE 40 MG: 40 TABLET ORAL at 08:03

## 2023-03-02 RX ADMIN — INSULIN ASPART 2 UNITS: 100 INJECTION, SOLUTION INTRAVENOUS; SUBCUTANEOUS at 11:03

## 2023-03-02 RX ADMIN — ATORVASTATIN CALCIUM 40 MG: 40 TABLET, FILM COATED ORAL at 08:03

## 2023-03-02 RX ADMIN — OXYCODONE HYDROCHLORIDE 10 MG: 10 TABLET ORAL at 05:03

## 2023-03-02 RX ADMIN — MELATONIN 6 MG: at 08:03

## 2023-03-02 NOTE — PROGRESS NOTES
"WOUND CARE CONSULT          Patient Name: Karina Rojas is a 82 y.o. female       Chief Complaint:   Wound Care Consult    Review of patient's allergies indicates:   Allergen Reactions    Tylenol [acetaminophen] Anaphylaxis and Shortness Of Breath        I have reviewed the patient's medical, surgical, family and social history.      Subjective:    HPI     Wound Location: bilateral heels, left dorsal foot    Wound Duration: heels onset 2/7/23; foot onset 2/16/23    Wound Context: heels- DTIs (pressure); dorsal foot- arterial    Wound Pain: moderate    81 YO WF currently in swing bed following right total hip arthroplasty. Pt has h/o PAD and is established with Dr. Bazan, but has not had recent follow up. In the past, the patient reports he did not want to intervene below the knee because she "wasn't having any problems." Pt smoked for over 60 years, but quit last September.   ABIs obtained: R 0.44, L 0.36   Vascular surgery F/U is pending. Wounds are stable.           Medications:    Current Facility-Administered Medications on File Prior to Visit   Medication Dose Route Frequency Provider Last Rate Last Admin    amiodarone tablet 200 mg  200 mg Oral BID PADDY HensonP   200 mg at 03/01/23 2017    atorvastatin tablet 40 mg  40 mg Oral QHS PADDY HensonP   40 mg at 03/01/23 2017    bisacodyL suppository 10 mg  10 mg Rectal Daily PRN Jorge Buckley DO   10 mg at 02/25/23 1051    calcium carbonate 200 mg calcium (500 mg) chewable tablet 500 mg  500 mg Oral BID PRN Jorge Buckley DO        dextrose 50% injection 12.5 g  12.5 g Intravenous PRN PREETHI Henson        dextrose 50% injection 25 g  25 g Intravenous PRN PREETHI Henson        digoxin tablet 0.125 mg  0.125 mg Oral Daily PADDY HensonP   0.125 mg at 02/26/23 0924    famotidine tablet 20 mg  20 mg Oral Daily Jorge Buckley DO   20 mg at 03/02/23 0834    furosemide tablet 40 mg  40 mg Oral Daily PADDY HensonP   40 mg " at 03/02/23 0834    glucagon (human recombinant) injection 1 mg  1 mg Intramuscular PRN PREETHI Henson        glucose chewable tablet 16 g  16 g Oral PRN Sean Corley, PREETHI        glucose chewable tablet 24 g  24 g Oral PRN Sean Corley, PREETHI        insulin aspart U-100 injection 0-5 Units  0-5 Units Subcutaneous QID (AC + HS) PRN PADDY HensonP   2 Units at 03/02/23 1133    levothyroxine tablet 25 mcg  25 mcg Oral Before breakfast PADDY HensonP   25 mcg at 03/02/23 0500    magnesium hydroxide 400 mg/5 ml suspension 2,400 mg  30 mL Oral Daily PRN Jorge Buckley DO   2,400 mg at 02/22/23 0842    melatonin tablet 6 mg  6 mg Oral Nightly PRN Jorge Buckley DO   6 mg at 03/01/23 2021    mupirocin 2 % ointment   Topical (Top) Daily Jorge Buckley DO   Given at 03/01/23 0857    ondansetron disintegrating tablet 4 mg  4 mg Oral Q8H PRN Jorge Buckley DO   4 mg at 02/28/23 0847    oxyCODONE immediate release tablet 10 mg  10 mg Oral Q6H PRN PADDY HensonP   10 mg at 03/02/23 1133    polyethylene glycol packet 17 g  17 g Oral Daily Jorge Buckley DO   17 g at 03/02/23 0834    predniSONE tablet 5 mg  5 mg Oral Daily PADDY HensonP   5 mg at 03/02/23 0834    prochlorperazine injection Soln 5 mg  5 mg Intramuscular Q6H PRN Sean Corley FNP   5 mg at 02/28/23 1659    rOPINIRole tablet 0.25 mg  0.25 mg Oral BID Jorge Buckley DO   0.25 mg at 03/01/23 2017    senna-docusate 8.6-50 mg per tablet 1 tablet  1 tablet Oral BID PRN Jorge Buckley DO   1 tablet at 02/28/23 0847    warfarin (COUMADIN) tablet 4 mg  4 mg Oral Every Tues, Thurs, Sat, Sun PADDY HensonP   4 mg at 02/28/23 1652    zinc oxide 16% (NUNO'S BUTT PASTE) topical ointment   Topical (Top) Daily Jorge Buckley DO   Given at 03/02/23 0839     Current Outpatient Medications on File Prior to Visit   Medication Sig Dispense Refill    amiodarone (PACERONE) 200 MG Tab Take 200 mg by mouth 2 (two) times daily.  Take 1 tablet by mouth in the morning and 1 tablet before bedtime.      atorvastatin (LIPITOR) 40 MG tablet Take 40 mg by mouth.      digoxin (LANOXIN) 125 mcg tablet Take 125 mcg by mouth.      fluticasone/umeclidin/vilanter (TRELEGY ELLIPTA INHL) Inhale into the lungs daily as needed.      furosemide (LASIX) 40 MG tablet Take 40 mg by mouth once daily.      levothyroxine (SYNTHROID) 50 MCG tablet Take 1 tablet (50 mcg total) by mouth before breakfast. (Patient taking differently: Take 25 mcg by mouth before breakfast.) 30 tablet 11    metFORMIN (GLUCOPHAGE) 1000 MG tablet Take 1,000 mg by mouth daily with breakfast.      predniSONE (DELTASONE) 5 MG tablet Take 5 mg by mouth.      warfarin (COUMADIN) 4 MG tablet Take 4 mg by mouth. Take 1 tablet by mouth 4 times a week.      warfarin (COUMADIN) 5 MG tablet Take by mouth.            Physical Exam  Vitals reviewed.   Constitutional:       Appearance: She is ill-appearing.      Interventions: Nasal cannula in place.   HENT:      Head: Normocephalic and atraumatic.      Right Ear: External ear normal.      Left Ear: External ear normal.   Cardiovascular:      Pulses:           Dorsalis pedis pulses are 0 on the right side and 0 on the left side.        Posterior tibial pulses are detected w/ Doppler on the right side and detected w/ Doppler on the left side.   Pulmonary:      Effort: Pulmonary effort is normal.      Breath sounds: Decreased air movement present.   Musculoskeletal:      Right lower leg: No edema.      Left lower leg: No edema.      Right foot: Foot drop present.        Feet:    Feet:      Right foot:      Skin integrity: Ulcer (DTI heel) present.      Left foot:      Skin integrity: Ulcer (DTI heel) present.   Skin:     General: Skin is warm and dry.      Findings: Wound present.   Neurological:      Mental Status: She is alert.        Please see Wound Docs for complete Wound Assessment and lower extremity assessment when applicable.    Documentation of  any procedures can be viewed in Wound Docs if applicable.         Assessment and Plan:    1. Pressure injury of deep tissue of right heel  Betadine daily  Offload  Cover with foam  OK to ambulate with therapy    2. Pressure injury of deep tissue of left heel  As above    3. PAD (peripheral artery disease)  F/U with Dr. Bazan as scheduled     4. 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     Follow Up & Goals:     Follow up in about 1 week (around 3/9/2023).     Short term goals  Reduction of devitalized tissue  Reduction of bacterial burden  Stimulate and/or maintain acute phases of wound healing  Promote granulation formation  Promote epithelization  Promote compliance with plan of care  Address factors affecting wound healing  Optimize nutritional status  Optimize vascular status    Long term goals  Prevent loss of limb  Prevent infection  Conservative Wound Treatment  Prevent recurrent ulcerations  Resolve wounds

## 2023-03-02 NOTE — SUBJECTIVE & OBJECTIVE
Interval History: seems ok now with wound vac.  Working with therapy as best as possible.     Review of Systems   Respiratory:  Negative for shortness of breath.    Cardiovascular:  Negative for chest pain.   Gastrointestinal:  Negative for abdominal pain, nausea and vomiting.   Psychiatric/Behavioral:  Negative for agitation and confusion.    All other systems reviewed and are negative.  Objective:     Vital Signs (Most Recent):  Temp: 97.5 °F (36.4 °C) (03/02/23 0802)  Pulse: (!) 58 (03/02/23 0802)  Resp: 18 (03/02/23 1133)  BP: 124/68 (03/02/23 0802)  SpO2: 99 % (03/02/23 0802)   Vital Signs (24h Range):  Temp:  [97.5 °F (36.4 °C)-98 °F (36.7 °C)] 97.5 °F (36.4 °C)  Pulse:  [58-72] 58  Resp:  [18-24] 18  SpO2:  [96 %-99 %] 99 %  BP: ()/(40-68) 124/68     Weight:  (unable to weight)  Body mass index is 22.86 kg/m².    Intake/Output Summary (Last 24 hours) at 3/2/2023 1147  Last data filed at 3/2/2023 0751  Gross per 24 hour   Intake 1460 ml   Output 975 ml   Net 485 ml      Physical Exam  Vitals reviewed.   Constitutional:       General: She is not in acute distress.     Appearance: Normal appearance.   HENT:      Head: Normocephalic and atraumatic.   Eyes:      General: No scleral icterus.     Extraocular Movements: Extraocular movements intact.      Conjunctiva/sclera: Conjunctivae normal.      Pupils: Pupils are equal, round, and reactive to light.   Cardiovascular:      Rate and Rhythm: Normal rate and regular rhythm.      Heart sounds: No murmur heard.    No friction rub. No gallop.   Pulmonary:      Effort: Pulmonary effort is normal. No respiratory distress.      Breath sounds: Normal breath sounds. No wheezing or rales.   Abdominal:      General: Abdomen is flat. Bowel sounds are normal. There is no distension.      Palpations: Abdomen is soft.      Tenderness: There is no abdominal tenderness. There is no guarding.   Musculoskeletal:         General: No swelling.      Comments: Hip pain ok     Skin:     General: Skin is warm and dry.      Coloration: Skin is not jaundiced.      Findings: No rash.      Comments: Wound vac   Neurological:      General: No focal deficit present.      Mental Status: She is alert and oriented to person, place, and time.      Sensory: No sensory deficit.      Motor: Weakness present.   Psychiatric:         Mood and Affect: Mood normal.         Behavior: Behavior normal.         Thought Content: Thought content normal.       Significant Labs: All pertinent labs within the past 24 hours have been reviewed.  Recent Lab Results         03/02/23  1044   03/02/23  0456   03/01/23  2018   03/01/23  1702        POC Glucose 216   151   130   247               Significant Imaging: I have reviewed all pertinent imaging results/findings within the past 24 hours.

## 2023-03-02 NOTE — PROGRESS NOTES
[facility]  Hospital Medicine  Progress Note    Patient Name: Karina Rojas  MRN: [id number]  Patient Class: IP- Swing   Admission Date: 2/10/2023  Length of Stay: 20 days  Attending Physician: Jorge Buckley DO  Primary Care Provider: Thalia Bowie NP        Subjective:     Principal Problem:Status post hip surgery        HPI:  Patient is a 82-year-old ill-appearing  female who is admitted to [facility] for skilled nursing services status post right intratrochanteric for mole nail removal and subsequent right total hip replacement with ORIF.  Patient is here for physical and occupational therapy services.  Patient's surgical procedure took place [facility] in [place] on February 9, 2023 by Dr. Teofilo Lewis.      Overview/Hospital Course:  2/13 2000 Called by RICARDO Galdamez LPN that Ms Rojas is having N/V and  BP 81/40.  Ms Rojas reports she has had intermittent NV since surg last week.  She is able to take sips of PO fluids but continues to have nausea.  Ms Rojas states her BP is always low 100s/40-50s.  She denies pain.  She denies diarrhea.  Will treat with 250 ml bolus, Zofran and continue to monitor.     2/15 Nausea seems better today, not vomiting but just nauseated.  Did eat lunch. Pain seems controlled.      2/20 Overall seems better, however, she has now developed R foot drop with decreased dorsiflexion.  Otherwise nausea seems better    2/27 Doing good today, still with foot drop, but working with therapy with brace. Arterial US done showing severe PAD which is known.     3/2 Went to surgeon for follow ended up with wound vac on incision site.  Otherwise doing well.       Interval History: seems ok now with wound vac.  Working with therapy as best as possible.     Review of Systems   Respiratory:  Negative for shortness of breath.    Cardiovascular:  Negative for chest pain.   Gastrointestinal:  Negative for abdominal  pain, nausea and vomiting.   Psychiatric/Behavioral:  Negative for agitation and confusion.    All other systems reviewed and are negative.  Objective:     Vital Signs (Most Recent):  Temp: 97.5 °F (36.4 °C) (03/02/23 0802)  Pulse: (!) 58 (03/02/23 0802)  Resp: 18 (03/02/23 1133)  BP: 124/68 (03/02/23 0802)  SpO2: 99 % (03/02/23 0802)   Vital Signs (24h Range):  Temp:  [97.5 °F (36.4 °C)-98 °F (36.7 °C)] 97.5 °F (36.4 °C)  Pulse:  [58-72] 58  Resp:  [18-24] 18  SpO2:  [96 %-99 %] 99 %  BP: ()/(40-68) 124/68     Weight:  (unable to weight)  Body mass index is 22.86 kg/m².    Intake/Output Summary (Last 24 hours) at 3/2/2023 1147  Last data filed at 3/2/2023 0751  Gross per 24 hour   Intake 1460 ml   Output 975 ml   Net 485 ml      Physical Exam  Vitals reviewed.   Constitutional:       General: She is not in acute distress.     Appearance: Normal appearance.   HENT:      Head: Normocephalic and atraumatic.   Eyes:      General: No scleral icterus.     Extraocular Movements: Extraocular movements intact.      Conjunctiva/sclera: Conjunctivae normal.      Pupils: Pupils are equal, round, and reactive to light.   Cardiovascular:      Rate and Rhythm: Normal rate and regular rhythm.      Heart sounds: No murmur heard.    No friction rub. No gallop.   Pulmonary:      Effort: Pulmonary effort is normal. No respiratory distress.      Breath sounds: Normal breath sounds. No wheezing or rales.   Abdominal:      General: Abdomen is flat. Bowel sounds are normal. There is no distension.      Palpations: Abdomen is soft.      Tenderness: There is no abdominal tenderness. There is no guarding.   Musculoskeletal:         General: No swelling.      Comments: Hip pain ok    Skin:     General: Skin is warm and dry.      Coloration: Skin is not jaundiced.      Findings: No rash.      Comments: Wound vac   Neurological:      General: No focal deficit present.      Mental Status: She is alert and oriented to person, place, and  time.      Sensory: No sensory deficit.      Motor: Weakness present.   Psychiatric:         Mood and Affect: Mood normal.         Behavior: Behavior normal.         Thought Content: Thought content normal.       Significant Labs: All pertinent labs within the past 24 hours have been reviewed.  Recent Lab Results         03/02/23  1044   03/02/23  0456   03/01/23  2018   03/01/23  1702        POC Glucose 216   151   130   247               Significant Imaging: I have reviewed all pertinent imaging results/findings within the past 24 hours.      Assessment/Plan:      * Status post hip surgery  Monitor pain and progress with therapy       Peripheral arterial disease  Known to patient.  Followed by Vascular.       Foot drop, right foot    She has extensive back issues and neurological issues, consider MRI.  Therapy working on Dorsiflexion.  May need boot.     Nausea  Better with zofran.  Monitor PO intake.       Chronic pain syndrome  Monitor while on pain meds.  Will not escalate at this time.       Diabetes  Patient's FSGs are controlled on current medication regimen.  Last A1c reviewed-   Lab Results   Component Value Date    HGBA1C 6.7 (H) 02/06/2023     Most recent fingerstick glucose reviewed- No results for input(s): POCTGLUCOSE in the last 24 hours.  Current correctional scale  Low  Maintain anti-hyperglycemic dose as follows-   Antihyperglycemics (From admission, onward)    Start     Stop Route Frequency Ordered    02/11/23 0745  metFORMIN tablet 1,000 mg         -- Oral With breakfast 02/10/23 1317    02/10/23 1514  insulin aspart U-100 injection 0-5 Units         -- SubQ Before meals & nightly PRN 02/10/23 1415        Hold Oral hypoglycemics while patient is in the hospital.    Atrial fibrillation  Patient with Long standing persistent (>12 months) atrial fibrillation which is controlled currently with Beta Blocker. Patient is currently in sinus rhythm.[id number] Score: 4. HASBLED Score: . Anticoagulation          HTN (hypertension)  Continue home meds and adjust as needed.         VTE Risk Mitigation (From admission, onward)         Ordered     warfarin (COUMADIN) tablet 4 mg  Every Tues, Thurs, Sat, Sun         02/10/23 1317     IP VTE HIGH RISK PATIENT  Once         02/10/23 1311     Place sequential compression device  Until discontinued         02/10/23 1311                Discharge Planning   CHANTAL:      Code Status: Full Code   Is the patient medically ready for discharge?:     Reason for patient still in hospital (select all that apply): Patient trending condition, Consult recommendations and PT / OT recommendations                     Jorge Buckley DO  Department of Hospital Medicine   [facility]

## 2023-03-02 NOTE — PT/OT/SLP PROGRESS
Physical Therapy Treatment    Patient Name:  Karina Rojas   MRN:  [id number]    Recommendations:     Discharge Recommendations: home with home health  Discharge Equipment Recommendations: none  Barriers to discharge: Decreased caregiver support    Assessment:     Karina Rojas is a 82 y.o. female admitted with a medical diagnosis of Status post hip surgery.  She presents with the following impairments/functional limitations: weakness, impaired endurance, impaired functional mobility, gait instability, impaired balance, decreased lower extremity function, pain, impaired cardiopulmonary response to activity, orthopedic precautions. Patient was provided with visual, verbal, and tactile cues for proper form and full ROM of therapeutic exercises. Patient was also provided with visual and verbal cues for proper hand placement during sit to stand transfer, and for increase L hip flexion to compensate for L foot drop during gait training. Patient stated that her R knee started hurting during gait training, but she stated that it improved after sitting and resting. PTA donned L foot drop brace for gait training, but donned at the end of treatment when patient returned to bed. Patient with no reports of increased pain after completion of treatment.     Rehab Prognosis: Fair; patient would benefit from acute skilled PT services to address these deficits and reach maximum level of function.    Recent Surgery: * No surgery found *      Plan:     During this hospitalization, patient to be seen 5 x/week to address the identified rehab impairments via gait training, therapeutic activities, therapeutic exercises, wheelchair management/training and progress toward the following goals:    Plan of Care Expires:  03/24/23    Subjective     Chief Complaint: Patient complained of back pain prior to treatment, and R knee pain when ambulating.   Patient/Family Comments/goals: Patient stated that her doctor stated that she will continue  to have posterior hip precautions and no active R hip ABD for another 2 months. Patient also stated that her doctor wants her to get a more rigid AFO.  Pain/Comfort:  Pain Rating 1: 5/10  Location - Side 1: Bilateral  Location - Orientation 1: generalized  Location 1: back  Pain Addressed 1: Pre-medicate for activity      Objective:     Communicated with pt prior to session. Patient found HOB elevated with oxygen, wound vac upon PTA entry to room.     General Precautions: Standard, fall  Orthopedic Precautions: RLE weight bearing as tolerated, RLE posterior precautions (No R hip ABD AROM)  Braces:  (L foot drop brace)  Respiratory Status: Nasal cannula, flow 3 L/min     Functional Mobility:  Bed Mobility:     Scooting: supervision  Supine to Sit: stand by assistance  Sit to Supine: minimum assistance  Transfers:     Sit to Stand:  contact guard assistance with rolling walker  Gait: Pt amb'd with RW/O2/wc trail/left foot drop brace x 14 ft including a 180 degree turn with CGA/Min A when patient started to fatigue and when she started having R knee pain.      AM-PAC 6 CLICK MOBILITY  Turning over in bed (including adjusting bedclothes, sheets and blankets)?: 4  Sitting down on and standing up from a chair with arms (e.g., wheelchair, bedside commode, etc.): 3  Moving from lying on back to sitting on the side of the bed?: 3  Moving to and from a bed to a chair (including a wheelchair)?: 3  Need to walk in hospital room?: 3  Climbing 3-5 steps with a railing?: 1  Basic Mobility Total Score: 17       Treatment & Education:  Patient performed the following therapeutic exercises: bridging 10x, SLRs 5x on R 10x on L (with min/mod A for last 5 reps on L), LAQs 20x, seated marching on LLE 20x, Hip ADD squeezes 20x       Patient left HOB elevated with heels floating all lines intact and call button in reach.    GOALS:   Multidisciplinary Problems       Physical Therapy Goals          Problem: Physical Therapy    Goal Priority  Disciplines Outcome Goal Variances Interventions   Physical Therapy Goal     PT, PT/OT Ongoing, Progressing     Description: STG - 3 weeks  1. Pt min A x 1 with bed mob.-MET  2. Pt min A x 1  with transfers using RW.-MET  3. Pt will amb 25 ft with min A x 1 using RW.-PROGRESSING  4. Pt will have 3/5 RLE strength.-PROGRESSING  5. Pt will recall 3/3 SHYANNE precautions.-MET    LTG - 6 weeks  1. Pt SBA with bed mob.  2. Pt SBA with transfers using RW.  3. Pt will amb 50 ft with SBA using RW.  4. Pt will have 4-/5 RLE strength.                       Time Tracking:     PT Received On: 03/02/23  PT Start Time: 1250     PT Stop Time: 1331  PT Total Time (min): 41 min     Billable Minutes: Gait Training 8, Therapeutic Activity 10, and Therapeutic Exercise 23    Treatment Type: Treatment  PT/PTA: PTA     PTA Visit Number: 1 03/02/2023

## 2023-03-02 NOTE — PLAN OF CARE
Problem: Adult Inpatient Plan of Care  Goal: Optimal Comfort and Wellbeing  Outcome: Ongoing, Progressing     Problem: Fall Injury Risk  Goal: Absence of Fall and Fall-Related Injury  Outcome: Ongoing, Progressing     Problem: Impaired Wound Healing  Goal: Optimal Wound Healing  Outcome: Ongoing, Not Progressing

## 2023-03-03 LAB
ANION GAP SERPL CALCULATED.3IONS-SCNC: 8 MMOL/L (ref 7–16)
BASOPHILS # BLD AUTO: 0.09 K/UL (ref 0–0.2)
BASOPHILS NFR BLD AUTO: 0.8 % (ref 0–1)
BUN SERPL-MCNC: 12 MG/DL (ref 7–18)
BUN/CREAT SERPL: 15 (ref 6–20)
CALCIUM SERPL-MCNC: 8.2 MG/DL (ref 8.5–10.1)
CHLORIDE SERPL-SCNC: 102 MMOL/L (ref 98–107)
CO2 SERPL-SCNC: 35 MMOL/L (ref 21–32)
CREAT SERPL-MCNC: 0.82 MG/DL (ref 0.55–1.02)
DIFFERENTIAL METHOD BLD: ABNORMAL
EGFR (NO RACE VARIABLE) (RUSH/TITUS): 72 ML/MIN/1.73M²
EOSINOPHIL # BLD AUTO: 0.25 K/UL (ref 0–0.5)
EOSINOPHIL NFR BLD AUTO: 2.3 % (ref 1–4)
ERYTHROCYTE [DISTWIDTH] IN BLOOD BY AUTOMATED COUNT: 17.2 % (ref 11.5–14.5)
GLUCOSE SERPL-MCNC: 140 MG/DL (ref 74–106)
GLUCOSE SERPL-MCNC: 147 MG/DL (ref 70–105)
GLUCOSE SERPL-MCNC: 188 MG/DL (ref 70–105)
GLUCOSE SERPL-MCNC: 200 MG/DL (ref 70–105)
HCT VFR BLD AUTO: 31.9 % (ref 38–47)
HGB BLD-MCNC: 9 G/DL (ref 12–16)
INR BLD: 2.01
LYMPHOCYTES # BLD AUTO: 1.38 K/UL (ref 1–4.8)
LYMPHOCYTES NFR BLD AUTO: 13 % (ref 27–41)
MCH RBC QN AUTO: 27.3 PG (ref 27–31)
MCHC RBC AUTO-ENTMCNC: 28.2 G/DL (ref 32–36)
MCV RBC AUTO: 96.7 FL (ref 80–96)
MONOCYTES # BLD AUTO: 0.8 K/UL (ref 0–0.8)
MONOCYTES NFR BLD AUTO: 7.5 % (ref 2–6)
MPC BLD CALC-MCNC: 9.7 FL (ref 9.4–12.4)
NEUTROPHILS # BLD AUTO: 8.13 K/UL (ref 1.8–7.7)
NEUTROPHILS NFR BLD AUTO: 76.4 % (ref 53–65)
PLATELET # BLD AUTO: 357 K/UL (ref 150–400)
POTASSIUM SERPL-SCNC: 4.8 MMOL/L (ref 3.5–5.1)
PROTHROMBIN TIME: 22.7 SECONDS (ref 11.7–14.7)
RBC # BLD AUTO: 3.3 M/UL (ref 4.2–5.4)
SODIUM SERPL-SCNC: 140 MMOL/L (ref 136–145)
WBC # BLD AUTO: 10.65 K/UL (ref 4.5–11)

## 2023-03-03 PROCEDURE — 25000003 PHARM REV CODE 250: Performed by: HOSPITALIST

## 2023-03-03 PROCEDURE — 80048 BASIC METABOLIC PNL TOTAL CA: CPT | Performed by: NURSE PRACTITIONER

## 2023-03-03 PROCEDURE — 97116 GAIT TRAINING THERAPY: CPT | Mod: CQ

## 2023-03-03 PROCEDURE — 99900035 HC TECH TIME PER 15 MIN (STAT)

## 2023-03-03 PROCEDURE — 25000003 PHARM REV CODE 250

## 2023-03-03 PROCEDURE — 85025 COMPLETE CBC W/AUTO DIFF WBC: CPT | Performed by: NURSE PRACTITIONER

## 2023-03-03 PROCEDURE — 94761 N-INVAS EAR/PLS OXIMETRY MLT: CPT

## 2023-03-03 PROCEDURE — 97110 THERAPEUTIC EXERCISES: CPT | Mod: CQ

## 2023-03-03 PROCEDURE — 11000004 HC SNF PRIVATE

## 2023-03-03 PROCEDURE — 82962 GLUCOSE BLOOD TEST: CPT

## 2023-03-03 PROCEDURE — 63600175 PHARM REV CODE 636 W HCPCS

## 2023-03-03 PROCEDURE — 27000221 HC OXYGEN, UP TO 24 HOURS

## 2023-03-03 PROCEDURE — 27000988

## 2023-03-03 PROCEDURE — 85610 PROTHROMBIN TIME: CPT

## 2023-03-03 RX ADMIN — AMIODARONE HYDROCHLORIDE 200 MG: 200 TABLET ORAL at 08:03

## 2023-03-03 RX ADMIN — OXYCODONE HYDROCHLORIDE 10 MG: 10 TABLET ORAL at 04:03

## 2023-03-03 RX ADMIN — DIGOXIN 0.12 MG: 125 TABLET ORAL at 09:03

## 2023-03-03 RX ADMIN — OXYCODONE HYDROCHLORIDE 10 MG: 10 TABLET ORAL at 01:03

## 2023-03-03 RX ADMIN — ROPINIROLE 0.25 MG: 0.25 TABLET, FILM COATED ORAL at 08:03

## 2023-03-03 RX ADMIN — ZINC OXIDE TOPICAL OINT.: at 09:03

## 2023-03-03 RX ADMIN — PREDNISONE 5 MG: 5 TABLET ORAL at 09:03

## 2023-03-03 RX ADMIN — LEVOTHYROXINE SODIUM 25 MCG: 0.03 TABLET ORAL at 05:03

## 2023-03-03 RX ADMIN — OXYCODONE HYDROCHLORIDE 10 MG: 10 TABLET ORAL at 09:03

## 2023-03-03 RX ADMIN — POLYETHYLENE GLYCOL 3350 17 G: 17 POWDER, FOR SOLUTION ORAL at 09:03

## 2023-03-03 RX ADMIN — FAMOTIDINE 20 MG: 20 TABLET, FILM COATED ORAL at 09:03

## 2023-03-03 RX ADMIN — ATORVASTATIN CALCIUM 40 MG: 40 TABLET, FILM COATED ORAL at 08:03

## 2023-03-03 RX ADMIN — OXYCODONE HYDROCHLORIDE 10 MG: 10 TABLET ORAL at 11:03

## 2023-03-03 NOTE — PT/OT/SLP PROGRESS
Physical Therapy Treatment    Patient Name:  Karina Rojas   MRN:  [id number]    Recommendations:     Discharge Recommendations: home with home health  Discharge Equipment Recommendations: none  Barriers to discharge: Decreased caregiver support    Assessment:     Karina Rojas is a 82 y.o. female admitted with a medical diagnosis of Status post hip surgery.  She presents with the following impairments/functional limitations: weakness, impaired endurance, impaired functional mobility, gait instability, impaired balance, decreased lower extremity function, impaired cardiopulmonary response to activity, orthopedic precautions. Patient was provided with visual, verbal, and tactile cues for proper form and full ROM of therapeutic exercises. PTA donned L foot drop brace for gait training, but donned at the end of treatment when patient returned to bed. Patient with no reports of increased pain or adverse effects after completion of treatment.     Rehab Prognosis: Fair; patient would benefit from acute skilled PT services to address these deficits and reach maximum level of function.    Recent Surgery: * No surgery found *      Plan:     During this hospitalization, patient to be seen 5 x/week to address the identified rehab impairments via gait training, therapeutic activities, therapeutic exercises, wheelchair management/training and progress toward the following goals:    Plan of Care Expires:  03/24/23    Subjective     Chief Complaint: Patient had no reports of pain, but she stated that her R knee and ankle were sore.   Patient/Family Comments/goals: Pt to dc home with family and home health when stablized.   Pain/Comfort:  Pain Rating 1: 0/10      Objective:     Communicated with pt prior to session. Patient found HOB elevated with oxygen, wound vac upon PTA entry to room.     General Precautions: Standard, fall  Orthopedic Precautions: RLE weight bearing as tolerated, RLE posterior precautions (No AROM R Hip  ABD.)  Braces:  (L foot drop brace)  Respiratory Status: Nasal cannula, flow 3 L/min     Functional Mobility:  Bed Mobility:     Scooting: supervision  Supine to Sit: stand by assistance  Sit to Supine: minimum assistance  Transfers:     Sit to Stand:  contact guard assistance with rolling walker  Gait: Pt amb'd with RW/O2/wc trail/left foot drop brace x 16 ft including a 180 degree turn with CGA.      AM-PAC 6 CLICK MOBILITY  Turning over in bed (including adjusting bedclothes, sheets and blankets)?: 4  Sitting down on and standing up from a chair with arms (e.g., wheelchair, bedside commode, etc.): 3  Moving from lying on back to sitting on the side of the bed?: 3  Moving to and from a bed to a chair (including a wheelchair)?: 3  Need to walk in hospital room?: 3  Climbing 3-5 steps with a railing?: 1  Basic Mobility Total Score: 17       Treatment & Education:  Patient performed the following therapeutic exercises: bridging 10x, SLRs 10x, LAQs 20x, seated marching on LLE 20x, Hip ADD squeezes 20x, ankle pumps 20x with max A on LLE       Patient left HOB elevated with heels floating all lines intact, call button in reach, and daughter present.    GOALS:   Multidisciplinary Problems       Physical Therapy Goals          Problem: Physical Therapy    Goal Priority Disciplines Outcome Goal Variances Interventions   Physical Therapy Goal     PT, PT/OT Ongoing, Progressing     Description: STG - 3 weeks  1. Pt min A x 1 with bed mob.-MET  2. Pt min A x 1  with transfers using RW.-MET  3. Pt will amb 25 ft with min A x 1 using RW.-PROGRESSING  4. Pt will have 3/5 RLE strength.-PROGRESSING  5. Pt will recall 3/3 SHYANNE precautions.-MET    LTG - 6 weeks  1. Pt SBA with bed mob.  2. Pt SBA with transfers using RW.  3. Pt will amb 50 ft with SBA using RW.  4. Pt will have 4-/5 RLE strength.                       Time Tracking:     PT Received On: 03/03/23  PT Start Time: 1104     PT Stop Time: 1135  PT Total Time (min): 31 min      Billable Minutes: Gait Training 8, Therapeutic Activity 4, and Therapeutic Exercise 19    Treatment Type: Treatment  PT/PTA: PTA     PTA Visit Number: 2     03/03/2023

## 2023-03-04 LAB
GLUCOSE SERPL-MCNC: 180 MG/DL (ref 70–105)
GLUCOSE SERPL-MCNC: 185 MG/DL (ref 70–105)
GLUCOSE SERPL-MCNC: 252 MG/DL (ref 70–105)
GLUCOSE SERPL-MCNC: 97 MG/DL (ref 70–105)

## 2023-03-04 PROCEDURE — 82962 GLUCOSE BLOOD TEST: CPT

## 2023-03-04 PROCEDURE — 63600175 PHARM REV CODE 636 W HCPCS

## 2023-03-04 PROCEDURE — 27000221 HC OXYGEN, UP TO 24 HOURS

## 2023-03-04 PROCEDURE — 25000003 PHARM REV CODE 250

## 2023-03-04 PROCEDURE — 25000003 PHARM REV CODE 250: Performed by: HOSPITALIST

## 2023-03-04 PROCEDURE — 27000988

## 2023-03-04 PROCEDURE — 11000004 HC SNF PRIVATE

## 2023-03-04 RX ADMIN — OXYCODONE HYDROCHLORIDE 10 MG: 10 TABLET ORAL at 08:03

## 2023-03-04 RX ADMIN — POLYETHYLENE GLYCOL 3350 17 G: 17 POWDER, FOR SOLUTION ORAL at 08:03

## 2023-03-04 RX ADMIN — AMIODARONE HYDROCHLORIDE 200 MG: 200 TABLET ORAL at 08:03

## 2023-03-04 RX ADMIN — PREDNISONE 5 MG: 5 TABLET ORAL at 08:03

## 2023-03-04 RX ADMIN — ATORVASTATIN CALCIUM 40 MG: 40 TABLET, FILM COATED ORAL at 09:03

## 2023-03-04 RX ADMIN — ROPINIROLE 0.25 MG: 0.25 TABLET, FILM COATED ORAL at 09:03

## 2023-03-04 RX ADMIN — AMIODARONE HYDROCHLORIDE 200 MG: 200 TABLET ORAL at 09:03

## 2023-03-04 RX ADMIN — OXYCODONE HYDROCHLORIDE 10 MG: 10 TABLET ORAL at 04:03

## 2023-03-04 RX ADMIN — FAMOTIDINE 20 MG: 20 TABLET, FILM COATED ORAL at 08:03

## 2023-03-04 RX ADMIN — ZINC OXIDE TOPICAL OINT.: at 08:03

## 2023-03-04 RX ADMIN — LEVOTHYROXINE SODIUM 25 MCG: 0.03 TABLET ORAL at 05:03

## 2023-03-04 RX ADMIN — INSULIN ASPART 1 UNITS: 100 INJECTION, SOLUTION INTRAVENOUS; SUBCUTANEOUS at 09:03

## 2023-03-04 RX ADMIN — DIGOXIN 0.12 MG: 125 TABLET ORAL at 08:03

## 2023-03-04 RX ADMIN — OXYCODONE HYDROCHLORIDE 10 MG: 10 TABLET ORAL at 10:03

## 2023-03-04 RX ADMIN — WARFARIN SODIUM 4 MG: 1 TABLET ORAL at 04:03

## 2023-03-04 RX ADMIN — ROPINIROLE 0.25 MG: 0.25 TABLET, FILM COATED ORAL at 08:03

## 2023-03-04 RX ADMIN — FUROSEMIDE 40 MG: 40 TABLET ORAL at 08:03

## 2023-03-04 NOTE — NURSING
Nurses Note -- 4 Eyes      3/4/2023   1:58 PM      Skin assessed during: Daily Assessment      [] No Pressure Injuries Present    [x]Prevention Measures Documented      [] Yes- Altered Skin Integrity Present or Discovered   [] LDA Added if Not in Epic (Describe Wound)   [x] New Altered Skin Integrity was Present on Admit and Documented in LDA   [] Wound Image Taken    Wound Care Consulted? Yes    Attending Nurse:  Brittni Wellington LPN     Second RN/Staff Member:  TIAGO Sykes RN

## 2023-03-05 LAB
GLUCOSE SERPL-MCNC: 135 MG/DL (ref 70–105)
GLUCOSE SERPL-MCNC: 150 MG/DL (ref 70–105)
GLUCOSE SERPL-MCNC: 159 MG/DL (ref 70–105)
GLUCOSE SERPL-MCNC: 217 MG/DL (ref 70–105)

## 2023-03-05 PROCEDURE — 25000003 PHARM REV CODE 250: Performed by: HOSPITALIST

## 2023-03-05 PROCEDURE — 27000988

## 2023-03-05 PROCEDURE — 63600175 PHARM REV CODE 636 W HCPCS

## 2023-03-05 PROCEDURE — 94761 N-INVAS EAR/PLS OXIMETRY MLT: CPT

## 2023-03-05 PROCEDURE — 25000003 PHARM REV CODE 250

## 2023-03-05 PROCEDURE — 82962 GLUCOSE BLOOD TEST: CPT

## 2023-03-05 PROCEDURE — 11000004 HC SNF PRIVATE

## 2023-03-05 PROCEDURE — 27000221 HC OXYGEN, UP TO 24 HOURS

## 2023-03-05 RX ADMIN — WARFARIN SODIUM 4 MG: 1 TABLET ORAL at 05:03

## 2023-03-05 RX ADMIN — INSULIN ASPART 2 UNITS: 100 INJECTION, SOLUTION INTRAVENOUS; SUBCUTANEOUS at 05:03

## 2023-03-05 RX ADMIN — OXYCODONE HYDROCHLORIDE 10 MG: 10 TABLET ORAL at 01:03

## 2023-03-05 RX ADMIN — ATORVASTATIN CALCIUM 40 MG: 40 TABLET, FILM COATED ORAL at 08:03

## 2023-03-05 RX ADMIN — MUPIROCIN: 20 OINTMENT TOPICAL at 10:03

## 2023-03-05 RX ADMIN — FAMOTIDINE 20 MG: 20 TABLET, FILM COATED ORAL at 09:03

## 2023-03-05 RX ADMIN — DIGOXIN 0.12 MG: 125 TABLET ORAL at 10:03

## 2023-03-05 RX ADMIN — LEVOTHYROXINE SODIUM 25 MCG: 0.03 TABLET ORAL at 06:03

## 2023-03-05 RX ADMIN — FUROSEMIDE 40 MG: 40 TABLET ORAL at 10:03

## 2023-03-05 RX ADMIN — ROPINIROLE 0.25 MG: 0.25 TABLET, FILM COATED ORAL at 10:03

## 2023-03-05 RX ADMIN — ROPINIROLE 0.25 MG: 0.25 TABLET, FILM COATED ORAL at 08:03

## 2023-03-05 RX ADMIN — AMIODARONE HYDROCHLORIDE 200 MG: 200 TABLET ORAL at 10:03

## 2023-03-05 RX ADMIN — OXYCODONE HYDROCHLORIDE 10 MG: 10 TABLET ORAL at 06:03

## 2023-03-05 RX ADMIN — PREDNISONE 5 MG: 5 TABLET ORAL at 10:03

## 2023-03-05 RX ADMIN — AMIODARONE HYDROCHLORIDE 200 MG: 200 TABLET ORAL at 08:03

## 2023-03-05 RX ADMIN — OXYCODONE HYDROCHLORIDE 10 MG: 10 TABLET ORAL at 09:03

## 2023-03-05 RX ADMIN — ZINC OXIDE TOPICAL OINT.: at 10:03

## 2023-03-05 RX ADMIN — POLYETHYLENE GLYCOL 3350 17 G: 17 POWDER, FOR SOLUTION ORAL at 10:03

## 2023-03-06 LAB
GLUCOSE SERPL-MCNC: 102 MG/DL (ref 70–105)
GLUCOSE SERPL-MCNC: 152 MG/DL (ref 70–105)
GLUCOSE SERPL-MCNC: 175 MG/DL (ref 70–105)
GLUCOSE SERPL-MCNC: 178 MG/DL (ref 70–105)

## 2023-03-06 PROCEDURE — 27000221 HC OXYGEN, UP TO 24 HOURS

## 2023-03-06 PROCEDURE — 97110 THERAPEUTIC EXERCISES: CPT | Mod: CQ

## 2023-03-06 PROCEDURE — 11000004 HC SNF PRIVATE

## 2023-03-06 PROCEDURE — 25000003 PHARM REV CODE 250

## 2023-03-06 PROCEDURE — 82962 GLUCOSE BLOOD TEST: CPT

## 2023-03-06 PROCEDURE — 27000988

## 2023-03-06 PROCEDURE — 97116 GAIT TRAINING THERAPY: CPT | Mod: CQ

## 2023-03-06 PROCEDURE — 63600175 PHARM REV CODE 636 W HCPCS

## 2023-03-06 PROCEDURE — 94761 N-INVAS EAR/PLS OXIMETRY MLT: CPT

## 2023-03-06 PROCEDURE — 25000003 PHARM REV CODE 250: Performed by: HOSPITALIST

## 2023-03-06 PROCEDURE — 99900035 HC TECH TIME PER 15 MIN (STAT)

## 2023-03-06 PROCEDURE — 97530 THERAPEUTIC ACTIVITIES: CPT | Mod: CQ

## 2023-03-06 RX ADMIN — PREDNISONE 5 MG: 5 TABLET ORAL at 09:03

## 2023-03-06 RX ADMIN — OXYCODONE HYDROCHLORIDE 10 MG: 10 TABLET ORAL at 12:03

## 2023-03-06 RX ADMIN — ROPINIROLE 0.25 MG: 0.25 TABLET, FILM COATED ORAL at 08:03

## 2023-03-06 RX ADMIN — AMIODARONE HYDROCHLORIDE 200 MG: 200 TABLET ORAL at 09:03

## 2023-03-06 RX ADMIN — OXYCODONE HYDROCHLORIDE 10 MG: 10 TABLET ORAL at 08:03

## 2023-03-06 RX ADMIN — OXYCODONE HYDROCHLORIDE 10 MG: 10 TABLET ORAL at 03:03

## 2023-03-06 RX ADMIN — FUROSEMIDE 40 MG: 40 TABLET ORAL at 09:03

## 2023-03-06 RX ADMIN — ATORVASTATIN CALCIUM 40 MG: 40 TABLET, FILM COATED ORAL at 08:03

## 2023-03-06 RX ADMIN — LEVOTHYROXINE SODIUM 25 MCG: 0.03 TABLET ORAL at 05:03

## 2023-03-06 RX ADMIN — AMIODARONE HYDROCHLORIDE 200 MG: 200 TABLET ORAL at 08:03

## 2023-03-06 RX ADMIN — ROPINIROLE 0.25 MG: 0.25 TABLET, FILM COATED ORAL at 09:03

## 2023-03-06 RX ADMIN — FAMOTIDINE 20 MG: 20 TABLET, FILM COATED ORAL at 09:03

## 2023-03-06 RX ADMIN — POLYETHYLENE GLYCOL 3350 17 G: 17 POWDER, FOR SOLUTION ORAL at 09:03

## 2023-03-06 RX ADMIN — ZINC OXIDE TOPICAL OINT.: at 09:03

## 2023-03-06 NOTE — PLAN OF CARE
Problem: Adult Inpatient Plan of Care  Goal: Optimal Comfort and Wellbeing  Outcome: Ongoing, Progressing     Problem: Fall Injury Risk  Goal: Absence of Fall and Fall-Related Injury  Outcome: Ongoing, Progressing     Problem: Skin Injury Risk Increased  Goal: Skin Health and Integrity  Outcome: Ongoing, Progressing

## 2023-03-06 NOTE — PLAN OF CARE
Problem: Adult Inpatient Plan of Care  Goal: Optimal Comfort and Wellbeing  Outcome: Ongoing, Progressing  Goal: Readiness for Transition of Care  Outcome: Ongoing, Progressing      Patient again states she has been taking medication more often than prescribed and acknowledges that she should have called before doing this. Questioning if PCP would be willing to refill her medication.

## 2023-03-06 NOTE — PLAN OF CARE
Problem: Impaired Wound Healing  Goal: Optimal Wound Healing  Outcome: Ongoing, Progressing     Problem: Skin Injury Risk Increased  Goal: Skin Health and Integrity  Outcome: Ongoing, Progressing     Problem: Gas Exchange Impaired  Goal: Optimal Gas Exchange  Outcome: Ongoing, Progressing

## 2023-03-06 NOTE — PLAN OF CARE
Problem: Adult Inpatient Plan of Care  Goal: Optimal Comfort and Wellbeing  Outcome: Ongoing, Progressing     Problem: Fall Injury Risk  Goal: Absence of Fall and Fall-Related Injury  3/6/2023 1554 by Kaleigh Woods RN  Outcome: Ongoing, Progressing  3/6/2023 1334 by Kaleigh Woods RN  Outcome: Ongoing, Progressing     Problem: Skin Injury Risk Increased  Goal: Skin Health and Integrity  3/6/2023 1554 by Kaleigh Woods RN  Outcome: Ongoing, Not Progressing  3/6/2023 1334 by Kaleigh Woods RN  Outcome: Ongoing, Progressing

## 2023-03-06 NOTE — PT/OT/SLP PROGRESS
Physical Therapy Treatment    Patient Name:  Karina Rojas   MRN:  [id number]    Recommendations:     Discharge Recommendations: home with home health  Discharge Equipment Recommendations: none  Barriers to discharge: Decreased caregiver support    Assessment:     Karina Rojas is a 82 y.o. female admitted with a medical diagnosis of Status post hip surgery.  She presents with the following impairments/functional limitations: weakness, impaired endurance, impaired functional mobility, gait instability, impaired balance, decreased lower extremity function, impaired cardiopulmonary response to activity, orthopedic precautions. Patient was provided with visual, verbal, and tactile cues for proper form of therapeutic exercises. PTA donned L foot drop brace for gait training, but donned at the end of treatment when patient returned to bed. Patient with no reports of increased pain or adverse effects after completion of treatment.     Rehab Prognosis: Fair; patient would benefit from acute skilled PT services to address these deficits and reach maximum level of function.    Recent Surgery: * No surgery found *      Plan:     During this hospitalization, patient to be seen 5 x/week to address the identified rehab impairments via gait training, therapeutic activities, therapeutic exercises, wheelchair management/training and progress toward the following goals:    Plan of Care Expires:  03/24/23    Subjective     Chief Complaint: Patient stated that her back was hurting.   Patient/Family Comments/goals: Pt to dc home with family and home health when stablized.   Pain/Comfort:  Pain Rating 1: 7/10  Location - Side 1: Bilateral  Location - Orientation 1: generalized  Location 1: back  Pain Addressed 1: Reposition      Objective:     Communicated with pt prior to session. Patient found HOB elevated with oxygen, wound vac upon PTA entry to room.     General Precautions: Standard, fall  Orthopedic Precautions: RLE weight  "bearing as tolerated, RLE posterior precautions (No active ROM R hip ABD)  Braces:  (L foot drop brace)  Respiratory Status: Nasal cannula, flow 2 L/min     Functional Mobility:  Bed Mobility:     Supine to Sit: modified independence  Sit to Supine: minimum assistance  Transfers:     Sit to Stand:  contact guard assistance with rolling walker  Bed to Chair: contact guard assistance with  rolling walker  using  Step Transfer  Gait: Pt amb'd with RW/O2/left foot drop brace x 12 ft +4 feet including a 180 degree turn with CGA.      AM-PAC 6 CLICK MOBILITY  Turning over in bed (including adjusting bedclothes, sheets and blankets)?: 4  Sitting down on and standing up from a chair with arms (e.g., wheelchair, bedside commode, etc.): 3  Moving from lying on back to sitting on the side of the bed?: 3  Moving to and from a bed to a chair (including a wheelchair)?: 3  Need to walk in hospital room?: 3  Climbing 3-5 steps with a railing?: 1  Basic Mobility Total Score: 17       Treatment & Education:  Patient performed the following therapeutic exercises: sit to stand from EOB 5x, seated marching on LLE 20x, LAQs 20x BLE, glute sets 20x 3" hold, hip ADD 20x 3" hold      Patient left HOB elevated with heels floating all lines intact and call button in reach.    GOALS:   Multidisciplinary Problems       Physical Therapy Goals          Problem: Physical Therapy    Goal Priority Disciplines Outcome Goal Variances Interventions   Physical Therapy Goal     PT, PT/OT Ongoing, Progressing     Description: STG - 3 weeks  1. Pt min A x 1 with bed mob.-MET  2. Pt min A x 1  with transfers using RW.-MET  3. Pt will amb 25 ft with min A x 1 using RW.-PROGRESSING  4. Pt will have 3/5 RLE strength.-PROGRESSING  5. Pt will recall 3/3 SHYANNE precautions.-MET    LTG - 6 weeks  1. Pt SBA with bed mob.  2. Pt SBA with transfers using RW.  3. Pt will amb 50 ft with SBA using RW.  4. Pt will have 4-/5 RLE strength.                       Time Tracking: "     PT Received On: 03/06/23  PT Start Time: 1017     PT Stop Time: 1100  PT Total Time (min): 43 min     Billable Minutes: Gait Training 8, Therapeutic Activity 10, and Therapeutic Exercise 25    Treatment Type: Treatment  PT/PTA: PTA     PTA Visit Number: 3     03/06/2023

## 2023-03-07 LAB
GLUCOSE SERPL-MCNC: 116 MG/DL (ref 70–105)
GLUCOSE SERPL-MCNC: 142 MG/DL (ref 70–105)
GLUCOSE SERPL-MCNC: 176 MG/DL (ref 70–105)
GLUCOSE SERPL-MCNC: 259 MG/DL (ref 70–105)

## 2023-03-07 PROCEDURE — 97110 THERAPEUTIC EXERCISES: CPT | Mod: CQ

## 2023-03-07 PROCEDURE — 25000003 PHARM REV CODE 250: Performed by: HOSPITALIST

## 2023-03-07 PROCEDURE — 82962 GLUCOSE BLOOD TEST: CPT

## 2023-03-07 PROCEDURE — 63600175 PHARM REV CODE 636 W HCPCS

## 2023-03-07 PROCEDURE — 25000003 PHARM REV CODE 250

## 2023-03-07 PROCEDURE — 97116 GAIT TRAINING THERAPY: CPT | Mod: CQ

## 2023-03-07 PROCEDURE — 27000221 HC OXYGEN, UP TO 24 HOURS

## 2023-03-07 PROCEDURE — 11000004 HC SNF PRIVATE

## 2023-03-07 PROCEDURE — 27000988

## 2023-03-07 RX ADMIN — POLYETHYLENE GLYCOL 3350 17 G: 17 POWDER, FOR SOLUTION ORAL at 09:03

## 2023-03-07 RX ADMIN — OXYCODONE HYDROCHLORIDE 10 MG: 10 TABLET ORAL at 03:03

## 2023-03-07 RX ADMIN — ROPINIROLE 0.25 MG: 0.25 TABLET, FILM COATED ORAL at 09:03

## 2023-03-07 RX ADMIN — ATORVASTATIN CALCIUM 40 MG: 40 TABLET, FILM COATED ORAL at 09:03

## 2023-03-07 RX ADMIN — LEVOTHYROXINE SODIUM 25 MCG: 0.03 TABLET ORAL at 05:03

## 2023-03-07 RX ADMIN — ZINC OXIDE TOPICAL OINT.: at 09:03

## 2023-03-07 RX ADMIN — PREDNISONE 5 MG: 5 TABLET ORAL at 09:03

## 2023-03-07 RX ADMIN — OXYCODONE HYDROCHLORIDE 10 MG: 10 TABLET ORAL at 09:03

## 2023-03-07 RX ADMIN — FAMOTIDINE 20 MG: 20 TABLET, FILM COATED ORAL at 09:03

## 2023-03-07 RX ADMIN — DIGOXIN 0.12 MG: 125 TABLET ORAL at 09:03

## 2023-03-07 RX ADMIN — AMIODARONE HYDROCHLORIDE 200 MG: 200 TABLET ORAL at 09:03

## 2023-03-07 RX ADMIN — OXYCODONE HYDROCHLORIDE 10 MG: 10 TABLET ORAL at 04:03

## 2023-03-07 RX ADMIN — FUROSEMIDE 40 MG: 40 TABLET ORAL at 09:03

## 2023-03-07 RX ADMIN — INSULIN ASPART 3 UNITS: 100 INJECTION, SOLUTION INTRAVENOUS; SUBCUTANEOUS at 05:03

## 2023-03-07 RX ADMIN — WARFARIN SODIUM 4 MG: 1 TABLET ORAL at 04:03

## 2023-03-07 NOTE — PT/OT/SLP PROGRESS
Physical Therapy Treatment    Patient Name:  Karina Rojas   MRN:  [id number]    Recommendations:     Discharge Recommendations: home with home health  Discharge Equipment Recommendations: none  Barriers to discharge: Decreased caregiver support    Assessment:     Karina Rojas is a 82 y.o. female admitted with a medical diagnosis of Status post hip surgery.  She presents with the following impairments/functional limitations: weakness, impaired endurance, impaired functional mobility, gait instability, impaired balance, decreased lower extremity function, impaired cardiopulmonary response to activity, orthopedic precautions. Patient was provided with visual and verbal cues for proper form of therapeutic exercises. PTA donned L foot drop brace for gait training, but doffed at the end of treatment when patient returned to bed. Patient stated that she felt shaky prior to second gait trials, but she agreed to ambulate again at a shorter distance. Patient with no reports of increased pain or adverse effects after completion of treatment.     Rehab Prognosis: Fair; patient would benefit from acute skilled PT services to address these deficits and reach maximum level of function.    Recent Surgery: * No surgery found *      Plan:     During this hospitalization, patient to be seen 5 x/week to address the identified rehab impairments via gait training, therapeutic exercises, wheelchair management/training, therapeutic activities and progress toward the following goals:    Plan of Care Expires:  03/24/23    Subjective     Chief Complaint: Patient stated that her back was hurting.   Patient/Family Comments/goals: Pt to dc home with family and home health when stablized.   Pain/Comfort:  Pain Rating 1: 6/10  Location - Side 1: Bilateral  Location - Orientation 1: generalized  Location 1: back  Pain Addressed 1: Reposition      Objective:     Communicated with pt prior to session. Patient found HOB elevated with oxygen,  "wound vac upon PTA entry to room.     General Precautions: Standard, fall  Orthopedic Precautions: RLE weight bearing as tolerated, RLE posterior precautions (No AROM R hip ABD)  Braces:  (L foot drop brace)  Respiratory Status: Nasal cannula, flow 2 L/min     Functional Mobility:  Bed Mobility:     Bridging: stand by assistance  Supine to Sit: supervision  Sit to Supine: minimum assistance  Transfers:     Sit to Stand:  contact guard assistance with rolling walker  Gait: Pt amb'd with RW/O2/left foot drop brace x 17 ft +9 ft with CGA.  Patient performed dynamic sitting and standing balance for removal of brief, but when PTA asked if she wanted help putting on another brief she stated that she wanted it to be left off for now RN notified.      AM-PAC 6 CLICK MOBILITY  Turning over in bed (including adjusting bedclothes, sheets and blankets)?: 4  Sitting down on and standing up from a chair with arms (e.g., wheelchair, bedside commode, etc.): 3  Moving from lying on back to sitting on the side of the bed?: 3  Moving to and from a bed to a chair (including a wheelchair)?: 3  Need to walk in hospital room?: 3  Climbing 3-5 steps with a railing?: 1  Basic Mobility Total Score: 17       Treatment & Education:  Patient performed the following therapeutic exercises: seated marching on LLE 20x, LAQs 20x BLE, bridging 10x, SLRs 6x+4x on RLE, 5x2 on LLE hip ADD 20x 3" hold      Patient left HOB elevated with heels floating all lines intact, call button in reach, and RN present.    GOALS:   Multidisciplinary Problems       Physical Therapy Goals          Problem: Physical Therapy    Goal Priority Disciplines Outcome Goal Variances Interventions   Physical Therapy Goal     PT, PT/OT Ongoing, Progressing     Description: STG - 3 weeks  1. Pt min A x 1 with bed mob.-MET  2. Pt min A x 1  with transfers using RW.-MET  3. Pt will amb 25 ft with min A x 1 using RW.-PROGRESSING  4. Pt will have 3/5 RLE strength.-PROGRESSING  5. Pt " will recall 3/3 SHYANNE precautions.-MET    LTG - 6 weeks  1. Pt SBA with bed mob.  2. Pt SBA with transfers using RW.  3. Pt will amb 50 ft with SBA using RW.  4. Pt will have 4-/5 RLE strength.                       Time Tracking:     PT Received On: 03/07/23  PT Start Time: 0900     PT Stop Time: 0930  PT Total Time (min): 30 min     Billable Minutes: Gait Training 10, Therapeutic Activity 5, and Therapeutic Exercise 15    Treatment Type: Treatment  PT/PTA: PTA     PTA Visit Number: 4     03/07/2023

## 2023-03-07 NOTE — PLAN OF CARE
Problem: Adult Inpatient Plan of Care  Goal: Readiness for Transition of Care  Outcome: Ongoing, Progressing     Problem: Diabetes Comorbidity  Goal: Blood Glucose Level Within Targeted Range  Outcome: Ongoing, Progressing

## 2023-03-07 NOTE — PROGRESS NOTES
[facility]    Clinical Nutrition Follow-Up         Date: 3/7/2023 10:02 AM    Karina Rojas is a 82 y.o. female with   Active Hospital Problems    Diagnosis  POA    *Status post hip surgery [Z98.890]  Not Applicable    Peripheral arterial disease [I73.9]  Yes    Foot drop, right foot [M21.371]  No            Nausea [R11.0]  No    Chronic pain syndrome [G89.4]  Yes    Atrial fibrillation [I48.91]  Yes    Diabetes [E11.9]  Yes    HTN (hypertension) [I10]  Yes      Resolved Hospital Problems   No resolved problems to display.       PMH:  has a past medical history of Carotid artery stenosis, Chronic respiratory failure with hypoxia, COPD (chronic obstructive pulmonary disease), Diabetes mellitus, DVT (deep venous thrombosis), Heart failure, High cholesterol, Hypertension, Hypothyroidism, and Paroxysmal atrial fibrillation.    Nutrition/Diet History  Spiritual, Cultural Beliefs, Voodoo Practices, Values that Affect Care: no  Food Allergies: NKFA  Factors Affecting Nutritional Intake: nausea/vomiting - improved with zofran    Interval History:   2/28:Patient currently receiving Diabetic Diet with 25% PO intake documented. PO intake inadequate to meet nutritional needs. She is receiving Glucerna with meals and accepting them @ %.      Recommend continue current diet order as medically appropriate and tolerated.    Glucerna provides 220kcal, 10g protein each. Encourage good PO intakes.     Patient CBW and BMI are within IBW range and WNL. Will continue to monitor weight trends.      Last BM 2/20 per flowsheets - pt receiving senna-docusate PRN per med list. Will continue to monitor PO intakes, weight trend, meds, labs, updates in patient condition. RD following.     2/28: Last BM 2/28 per flowsheets. Intake seems to be improved, ranging from 25% to 100%. Appetite previously affected by nausea; although, nausea now with improvement with zofran. No new weights.    3/7: Good  "appetite, ate 100% breakfast this am. No no weight since 2/10/23. Wound vac to incision.    Diet Order: Diet diabetic  Oral Supplements: Glucerna @ all meals (any flavor)      Anthropometrics:   Height: 5' 7" (170.2 cm)  Weight:  66.2 kg (145 lb) -- 2/10/23  IBW: 135 lbs  BMI (Calculated): 22.9  BMI Classification: Normal    Labs:   No results for input(s): NA, K, BUN, CREATININE, GLU, CALCIUM, ALBUMIN, CL, ALT, AST, PHOS in the last 72 hours.  Last A1c:   Lab Results   Component Value Date    HGBA1C 6.7 (H) 02/06/2023     Lab Results   Component Value Date    RBC 3.30 (L) 03/03/2023    HGB 9.0 (L) 03/03/2023    HCT 31.9 (L) 03/03/2023    MCV 96.7 (H) 03/03/2023    MCH 27.3 03/03/2023    MCHC 28.2 (L) 03/03/2023       Meds:   Scheduled Meds:   amiodarone  200 mg Oral BID    atorvastatin  40 mg Oral QHS    digoxin  0.125 mg Oral Daily    famotidine  20 mg Oral Daily    furosemide  40 mg Oral Daily    levothyroxine  25 mcg Oral Before breakfast    mupirocin   Topical (Top) Daily    polyethylene glycol  17 g Oral Daily    predniSONE  5 mg Oral Daily    rOPINIRole  0.25 mg Oral BID    warfarin  4 mg Oral Every Tues, Thurs, Sat, Sun    zinc oxide   Topical (Top) Daily     PRN Meds:.bisacodyL, calcium carbonate, dextrose 50%, dextrose 50%, glucagon (human recombinant), glucose, glucose, insulin aspart U-100, magnesium hydroxide 400 mg/5 ml, melatonin, ondansetron, oxyCODONE, prochlorperazine, senna-docusate 8.6-50 mg    Physical Review:  General: alert, oriented x4, NC in place  Abd/GI: WDL  Last Bowel Movement: 03/06/23  Ext:   trace edema to L and R leg  Skin: DTI to R heel and L heel; partial thickness tissue loss to L anterior pelvis; incision to R great trochanter w/ wound vac in place; full thickness tissue loss to L lower arm; full thickness tissue loss to L foot    Amado Score:   Amado Risk Assessment  Sensory Perception: 3-->slightly limited  Moisture: 3-->occasionally moist  Activity: 2-->chairfast  Mobility: " 3-->slightly limited  Nutrition: 3-->adequate  Friction and Shear: 2-->potential problem  Amado Score: 16    Malnutrition Screening Tool  Have you recently lost weight without trying?: No  Weight loss score: 0  Have you been eating poorly because of a decreased appetite?: No  Appetite score: 0  MST Score: 0    Estimated Nutrition Needs:   25-30 kcal/[id number] kcal/day  1-1.5 g pro/k-99 g protein/day   1139-0006 ml fluid/day    Nutrition Diagnosis:  Altered nutrition related lab values RT diabetes mellitus AEB elevated glucose and A1C    Increased nutrient needs RT wound healing AEB multiple full thickness and DTI wounds     Recommendations/ Intervention:  Continue diabetic diet with Glucerna supplements    Encourage use of Glucerna or Boost Glucose Control supplements to provide additional protein/kcals    Obtain new weight if able    Encourage po intake and hydration    Consider addition of MVI     RD to monitor po intake, wt, nutrition-related labs, skin changes, and meds     Nutrition Risk: moderate    Signed  Elizabeth Ramirez, MS, RD, LD  Available via [employer]

## 2023-03-08 LAB
GLUCOSE SERPL-MCNC: 115 MG/DL (ref 70–105)
GLUCOSE SERPL-MCNC: 185 MG/DL (ref 70–105)
GLUCOSE SERPL-MCNC: 190 MG/DL (ref 70–105)

## 2023-03-08 PROCEDURE — 27000221 HC OXYGEN, UP TO 24 HOURS

## 2023-03-08 PROCEDURE — 63600175 PHARM REV CODE 636 W HCPCS

## 2023-03-08 PROCEDURE — 27000958

## 2023-03-08 PROCEDURE — 99900035 HC TECH TIME PER 15 MIN (STAT)

## 2023-03-08 PROCEDURE — 25000003 PHARM REV CODE 250

## 2023-03-08 PROCEDURE — 25000003 PHARM REV CODE 250: Performed by: HOSPITALIST

## 2023-03-08 PROCEDURE — 11000004 HC SNF PRIVATE

## 2023-03-08 PROCEDURE — 27000988

## 2023-03-08 PROCEDURE — 82962 GLUCOSE BLOOD TEST: CPT

## 2023-03-08 RX ORDER — FERROUS SULFATE, DRIED 160(50) MG
1 TABLET, EXTENDED RELEASE ORAL 2 TIMES DAILY
Status: DISCONTINUED | OUTPATIENT
Start: 2023-03-08 | End: 2023-03-10 | Stop reason: HOSPADM

## 2023-03-08 RX ADMIN — ONDANSETRON 4 MG: 4 TABLET, ORALLY DISINTEGRATING ORAL at 10:03

## 2023-03-08 RX ADMIN — FAMOTIDINE 20 MG: 20 TABLET, FILM COATED ORAL at 09:03

## 2023-03-08 RX ADMIN — ZINC OXIDE TOPICAL OINT.: at 09:03

## 2023-03-08 RX ADMIN — Medication 1 TABLET: at 08:03

## 2023-03-08 RX ADMIN — ATORVASTATIN CALCIUM 40 MG: 40 TABLET, FILM COATED ORAL at 08:03

## 2023-03-08 RX ADMIN — AMIODARONE HYDROCHLORIDE 200 MG: 200 TABLET ORAL at 08:03

## 2023-03-08 RX ADMIN — DIGOXIN 0.12 MG: 125 TABLET ORAL at 09:03

## 2023-03-08 RX ADMIN — PREDNISONE 5 MG: 5 TABLET ORAL at 09:03

## 2023-03-08 RX ADMIN — OXYCODONE HYDROCHLORIDE 10 MG: 10 TABLET ORAL at 05:03

## 2023-03-08 RX ADMIN — OXYCODONE HYDROCHLORIDE 10 MG: 10 TABLET ORAL at 11:03

## 2023-03-08 RX ADMIN — ROPINIROLE 0.25 MG: 0.25 TABLET, FILM COATED ORAL at 08:03

## 2023-03-08 RX ADMIN — ROPINIROLE 0.25 MG: 0.25 TABLET, FILM COATED ORAL at 09:03

## 2023-03-08 RX ADMIN — OXYCODONE HYDROCHLORIDE 10 MG: 10 TABLET ORAL at 09:03

## 2023-03-08 RX ADMIN — LEVOTHYROXINE SODIUM 25 MCG: 0.03 TABLET ORAL at 05:03

## 2023-03-08 RX ADMIN — AMIODARONE HYDROCHLORIDE 200 MG: 200 TABLET ORAL at 09:03

## 2023-03-08 RX ADMIN — OXYCODONE HYDROCHLORIDE 10 MG: 10 TABLET ORAL at 12:03

## 2023-03-08 NOTE — PROGRESS NOTES
Patient not seen this date due to patient reporting that she felt nauseas during treatment attempt this morning, and due to patient being out for an appointment during second attempt. Patient also stated this morning that she feels nauseous after she finishes with PT treatment each day. Will follow up tomorrow.

## 2023-03-08 NOTE — NURSING
1655 Patient back from Ortho appointment.  TIERNEY wound vac was changed. Follow appointment on 3/15/23 at 1215. [facility] verbal order given for Hugo (or equivalent) TID with meals for 30 days.

## 2023-03-08 NOTE — PLAN OF CARE
Problem: Physical Therapy  Goal: Physical Therapy Goal  Description: STG - 3 weeks  1. Pt min A x 1 with bed mob.-MET  2. Pt min A x 1  with transfers using RW.-MET  3. Pt will amb 25 ft with min A x 1 using RW.-PROGRESSING  4. Pt will have 3/5 RLE strength.-MET  5. Pt will recall 3/3 SHYANNE precautions.-MET    LTG - 6 weeks  1. Pt SBA with bed mob.-PROGRESSING  2. Pt SBA with transfers using RW.-PROGRESSING  3. Pt will amb 50 ft with SBA using RW.-PROGRESSING  4. Pt will have 4-/5 RLE strength.-PROGRESSING  Outcome: Ongoing, Progressing

## 2023-03-08 NOTE — PROGRESS NOTES
Clinical Pharmacy Chart Review Note      Admit Date: 2/10/2023   LOS: 26 days       Karina Rojas is a 82 y.o. female admitted to SNF for PT/OT after hospitalization for  right total hip replacement.    Active Hospital Problems    Diagnosis  POA    *Status post hip surgery [Z98.890]  Not Applicable    Peripheral arterial disease [I73.9]  Yes    Foot drop, right foot [M21.371]  No            Nausea [R11.0]  No    Chronic pain syndrome [G89.4]  Yes    Atrial fibrillation [I48.91]  Yes    Diabetes [E11.9]  Yes    HTN (hypertension) [I10]  Yes      Resolved Hospital Problems   No resolved problems to display.     Review of patient's allergies indicates:   Allergen Reactions    Tylenol [acetaminophen] Anaphylaxis and Shortness Of Breath     Patient Active Problem List    Diagnosis Date Noted    Peripheral arterial disease 02/27/2023    Foot drop, right foot 02/20/2023    Nausea 02/15/2023    Status post hip surgery 02/13/2023    Chronic pain syndrome 02/13/2023    HTN (hypertension) 01/04/2023    Atrial fibrillation 01/04/2023    Diabetes 01/04/2023       Scheduled Meds:    amiodarone  200 mg Oral BID    atorvastatin  40 mg Oral QHS    digoxin  0.125 mg Oral Daily    famotidine  20 mg Oral Daily    furosemide  40 mg Oral Daily    levothyroxine  25 mcg Oral Before breakfast    mupirocin   Topical (Top) Daily    polyethylene glycol  17 g Oral Daily    predniSONE  5 mg Oral Daily    rOPINIRole  0.25 mg Oral BID    warfarin  4 mg Oral Every Tues, Thurs, Sat, Sun    zinc oxide   Topical (Top) Daily     Continuous Infusions:   PRN Meds: bisacodyL, calcium carbonate, dextrose 50%, dextrose 50%, glucagon (human recombinant), glucose, glucose, insulin aspart U-100, magnesium hydroxide 400 mg/5 ml, melatonin, ondansetron, oxyCODONE, prochlorperazine, senna-docusate 8.6-50 mg    OBJECTIVE:     Vital Signs (Last 24H)  Temp:  [97.3 °F (36.3 °C)-98 °F (36.7 °C)]   Pulse:  [65-82]   Resp:  [16-21]   BP: ()/(44-58)   SpO2:  [93  %-98 %]     Laboratory:  CBC:   Recent Labs   Lab 03/03/23  0638   WBC 10.65   RBC 3.30*   HGB 9.0*   HCT 31.9*      MCV 96.7*   MCH 27.3   MCHC 28.2*     BMP:   Recent Labs   Lab 03/03/23  0638   *      K 4.8      CO2 35*   BUN 12   CREATININE 0.82   CALCIUM 8.2*     Coagulation:   Recent Labs   Lab 03/03/23  1429   INR 2.01     .    ASSESSMENT/PLAN:     Estimated Creatinine Clearance: 53.2 mL/min (based on SCr of 0.82 mg/dL).Medications reviewed, no dose adjustments needed.INR 2.01, continue current dose. TSH 6.079 on 01-, dose adjusted at that time. GI prophylaxis with famotidine, due to prednisone and warfarin tx.Weekly swing bed medication regiment review complete.  Will continue to monitor.  Mike Nunez, Pharm. D.  Director of Pharmacy  [facility]  [phone]  [email or web address]

## 2023-03-09 ENCOUNTER — CLINICAL SUPPORT (OUTPATIENT)
Dept: WOUND CARE | Facility: HOSPITAL | Age: 83
DRG: 561 | End: 2023-03-09
Attending: HOSPITALIST
Payer: MEDICARE

## 2023-03-09 VITALS
RESPIRATION RATE: 18 BRPM | HEART RATE: 63 BPM | TEMPERATURE: 97 F | DIASTOLIC BLOOD PRESSURE: 39 MMHG | SYSTOLIC BLOOD PRESSURE: 138 MMHG

## 2023-03-09 DIAGNOSIS — L89.616 PRESSURE INJURY OF DEEP TISSUE OF RIGHT HEEL: ICD-10-CM

## 2023-03-09 DIAGNOSIS — L89.626 PRESSURE INJURY OF DEEP TISSUE OF LEFT HEEL: Primary | ICD-10-CM

## 2023-03-09 LAB
GLUCOSE SERPL-MCNC: 154 MG/DL (ref 70–105)
GLUCOSE SERPL-MCNC: 160 MG/DL (ref 70–105)
GLUCOSE SERPL-MCNC: 173 MG/DL (ref 70–105)
GLUCOSE SERPL-MCNC: 237 MG/DL (ref 70–105)

## 2023-03-09 PROCEDURE — 97116 GAIT TRAINING THERAPY: CPT

## 2023-03-09 PROCEDURE — 99213 OFFICE O/P EST LOW 20 MIN: CPT

## 2023-03-09 PROCEDURE — 27000221 HC OXYGEN, UP TO 24 HOURS

## 2023-03-09 PROCEDURE — 82962 GLUCOSE BLOOD TEST: CPT

## 2023-03-09 PROCEDURE — 99308 SBSQ NF CARE LOW MDM 20: CPT | Mod: ,,, | Performed by: HOSPITALIST

## 2023-03-09 PROCEDURE — 27000988

## 2023-03-09 PROCEDURE — 11000004 HC SNF PRIVATE

## 2023-03-09 PROCEDURE — 94761 N-INVAS EAR/PLS OXIMETRY MLT: CPT

## 2023-03-09 PROCEDURE — 99308 PR NURSING FAC CARE, SUBSEQ, MINOR COMPLIC: ICD-10-PCS | Mod: ,,, | Performed by: HOSPITALIST

## 2023-03-09 PROCEDURE — 63600175 PHARM REV CODE 636 W HCPCS

## 2023-03-09 PROCEDURE — 25000003 PHARM REV CODE 250: Performed by: HOSPITALIST

## 2023-03-09 PROCEDURE — 27000958

## 2023-03-09 PROCEDURE — 25000003 PHARM REV CODE 250

## 2023-03-09 PROCEDURE — 97110 THERAPEUTIC EXERCISES: CPT

## 2023-03-09 RX ADMIN — Medication 1 TABLET: at 09:03

## 2023-03-09 RX ADMIN — FUROSEMIDE 40 MG: 40 TABLET ORAL at 09:03

## 2023-03-09 RX ADMIN — FAMOTIDINE 20 MG: 20 TABLET, FILM COATED ORAL at 09:03

## 2023-03-09 RX ADMIN — ROPINIROLE 0.25 MG: 0.25 TABLET, FILM COATED ORAL at 09:03

## 2023-03-09 RX ADMIN — OXYCODONE HYDROCHLORIDE 10 MG: 10 TABLET ORAL at 03:03

## 2023-03-09 RX ADMIN — WARFARIN SODIUM 4 MG: 1 TABLET ORAL at 04:03

## 2023-03-09 RX ADMIN — OXYCODONE HYDROCHLORIDE 10 MG: 10 TABLET ORAL at 09:03

## 2023-03-09 RX ADMIN — LEVOTHYROXINE SODIUM 25 MCG: 0.03 TABLET ORAL at 06:03

## 2023-03-09 RX ADMIN — POLYETHYLENE GLYCOL 3350 17 G: 17 POWDER, FOR SOLUTION ORAL at 09:03

## 2023-03-09 RX ADMIN — ATORVASTATIN CALCIUM 40 MG: 40 TABLET, FILM COATED ORAL at 09:03

## 2023-03-09 RX ADMIN — AMIODARONE HYDROCHLORIDE 200 MG: 200 TABLET ORAL at 09:03

## 2023-03-09 RX ADMIN — PREDNISONE 5 MG: 5 TABLET ORAL at 09:03

## 2023-03-09 RX ADMIN — INSULIN ASPART 2 UNITS: 100 INJECTION, SOLUTION INTRAVENOUS; SUBCUTANEOUS at 11:03

## 2023-03-09 RX ADMIN — DIGOXIN 0.12 MG: 125 TABLET ORAL at 09:03

## 2023-03-09 RX ADMIN — ZINC OXIDE TOPICAL OINT.: at 09:03

## 2023-03-09 NOTE — PLAN OF CARE
Patient discussed in multidisciplinary meeting. PT reports patient will participate in bed/room exercises. Has been seen by ortho physician and has wound vac. Wound care also seeing her. Will follow for discharge needs.

## 2023-03-09 NOTE — PROGRESS NOTES
[facility]  Hospital Medicine  Progress Note    Patient Name: Karina Rojas  MRN: [id number]  Patient Class: IP- Swing   Admission Date: 2/10/2023  Length of Stay: 27 days  Attending Physician: Jorge Buckley DO  Primary Care Provider: Thalia Bowie NP        Subjective:     Principal Problem:Status post hip surgery        HPI:  Patient is a 82-year-old ill-appearing  female who is admitted to [facility] for skilled nursing services status post right intratrochanteric for mole nail removal and subsequent right total hip replacement with ORIF.  Patient is here for physical and occupational therapy services.  Patient's surgical procedure took place [facility] in [place] on February 9, 2023 by Dr. Teofilo Lewis.      Overview/Hospital Course:  2/13 2000 Called by RICARDO Galdamez LPN that Ms Rojas is having N/V and  BP 81/40.  Ms Rojas reports she has had intermittent NV since surg last week.  She is able to take sips of PO fluids but continues to have nausea.  Ms Rojas states her BP is always low 100s/40-50s.  She denies pain.  She denies diarrhea.  Will treat with 250 ml bolus, Zofran and continue to monitor.     2/15 Nausea seems better today, not vomiting but just nauseated.  Did eat lunch. Pain seems controlled.      2/20 Overall seems better, however, she has now developed R foot drop with decreased dorsiflexion.  Otherwise nausea seems better    2/27 Doing good today, still with foot drop, but working with therapy with brace. Arterial US done showing severe PAD which is known.     3/2 Went to surgeon for follow ended up with wound vac on incision site.  Otherwise doing well.     3/9 Had follow up yesterday for wound vac and surgery eval, continue wound vac, otherwise improving with therapy       Interval History: no major issues, wound vac remains.     Review of Systems   Respiratory:  Negative for shortness of breath.     Cardiovascular:  Negative for chest pain.   Gastrointestinal:  Negative for abdominal pain, nausea and vomiting.   Musculoskeletal:  Positive for gait problem.   Neurological:  Positive for weakness.   Psychiatric/Behavioral:  Negative for agitation and confusion.    All other systems reviewed and are negative.  Objective:     Vital Signs (Most Recent):  Temp: 97.4 °F (36.3 °C) (03/09/23 0809)  Pulse: 63 (03/09/23 0809)  Resp: 18 (03/09/23 0905)  BP: (!) 138/39 (03/09/23 0809)  SpO2: 96 % (03/09/23 0809)   Vital Signs (24h Range):  Temp:  [97.4 °F (36.3 °C)-98.2 °F (36.8 °C)] 97.4 °F (36.3 °C)  Pulse:  [63-80] 63  Resp:  [17-22] 18  SpO2:  [94 %-99 %] 96 %  BP: (127-138)/(35-49) 138/39     Weight:  (unable to weight)  Body mass index is 22.86 kg/m².    Intake/Output Summary (Last 24 hours) at 3/9/2023 1156  Last data filed at 3/9/2023 0629  Gross per 24 hour   Intake 1057 ml   Output 150 ml   Net 907 ml      Physical Exam  Vitals reviewed.   Constitutional:       General: She is not in acute distress.     Appearance: Normal appearance.   HENT:      Head: Normocephalic and atraumatic.   Eyes:      General: No scleral icterus.     Extraocular Movements: Extraocular movements intact.      Conjunctiva/sclera: Conjunctivae normal.      Pupils: Pupils are equal, round, and reactive to light.   Cardiovascular:      Rate and Rhythm: Normal rate and regular rhythm.      Heart sounds: No murmur heard.    No friction rub. No gallop.   Pulmonary:      Effort: Pulmonary effort is normal. No respiratory distress.      Breath sounds: Normal breath sounds. No wheezing or rales.   Abdominal:      General: Abdomen is flat. Bowel sounds are normal. There is no distension.      Palpations: Abdomen is soft.      Tenderness: There is no abdominal tenderness. There is no guarding.   Musculoskeletal:         General: No swelling.      Comments: Hip pain ok    Skin:     General: Skin is warm and dry.      Coloration: Skin is not  jaundiced.      Findings: No rash.      Comments: Wound vac   Neurological:      General: No focal deficit present.      Mental Status: She is alert and oriented to person, place, and time.      Sensory: No sensory deficit.      Motor: Weakness present.   Psychiatric:         Mood and Affect: Mood normal.         Behavior: Behavior normal.         Thought Content: Thought content normal.       Significant Labs: All pertinent labs within the past 24 hours have been reviewed.  Recent Lab Results         03/09/23  1105   03/09/23  0605   03/08/23  2043   03/08/23  1726        POC Glucose 237   154   190   185               Significant Imaging: I have reviewed all pertinent imaging results/findings within the past 24 hours.      Assessment/Plan:      * Status post hip surgery  Monitor pain and progress with therapy       Peripheral arterial disease  Known to patient.  Followed by Vascular.       Foot drop, right foot    She has extensive back issues and neurological issues, consider MRI.  Therapy working on Dorsiflexion.  May need boot.     Nausea  Better with zofran.  Monitor PO intake.       Chronic pain syndrome  Monitor while on pain meds.  Will not escalate at this time.       Diabetes  Patient's FSGs are controlled on current medication regimen.  Last A1c reviewed-   Lab Results   Component Value Date    HGBA1C 6.7 (H) 02/06/2023     Most recent fingerstick glucose reviewed- No results for input(s): POCTGLUCOSE in the last 24 hours.  Current correctional scale  Low  Maintain anti-hyperglycemic dose as follows-   Antihyperglycemics (From admission, onward)    Start     Stop Route Frequency Ordered    02/11/23 0745  metFORMIN tablet 1,000 mg         -- Oral With breakfast 02/10/23 1317    02/10/23 1514  insulin aspart U-100 injection 0-5 Units         -- SubQ Before meals & nightly PRN 02/10/23 1415        Hold Oral hypoglycemics while patient is in the hospital.    Atrial fibrillation  Patient with Long standing  persistent (>12 months) atrial fibrillation which is controlled currently with Beta Blocker. Patient is currently in sinus rhythm.[id number] Score: 4. HASBLED Score: . Anticoagulation         HTN (hypertension)  Continue home meds and adjust as needed.         VTE Risk Mitigation (From admission, onward)         Ordered     warfarin (COUMADIN) tablet 4 mg  Every Tues, Thurs, Sat, Sun         02/10/23 1317     IP VTE HIGH RISK PATIENT  Once         02/10/23 1311     Place sequential compression device  Until discontinued         02/10/23 1311                Discharge Planning   CHANTAL:      Code Status: Full Code   Is the patient medically ready for discharge?:     Reason for patient still in hospital (select all that apply): Patient trending condition and PT / OT recommendations                     Jogre Buckley DO  Department of Hospital Medicine   [facility]

## 2023-03-09 NOTE — PT/OT/SLP PROGRESS
Physical Therapy Treatment    Patient Name:  Karina Rojas   MRN:  [id number]    Recommendations:     Discharge Recommendations: home with home health  Discharge Equipment Recommendations: none  Barriers to discharge:  wound    Assessment:     Karina Rojas is a 82 y.o. female admitted with a medical diagnosis of Status post hip surgery.  She presents with the following impairments/functional limitations: weakness, impaired endurance, impaired functional mobility, gait instability, impaired balance, impaired skin, impaired cardiopulmonary response to activity, orthopedic precautions .    Pt improved with gait this visit.    Rehab Prognosis: Good; patient would benefit from acute skilled PT services to address these deficits and reach maximum level of function.    Recent Surgery: * No surgery found *      Plan:     During this hospitalization, patient to be seen 5 x/week to address the identified rehab impairments via gait training, therapeutic activities, therapeutic exercises and progress toward the following goals:    Plan of Care Expires:  03/24/23    Subjective     Chief Complaint: Pt states that she saw her ortho surgeon yesterday who replace her would vac and stated that she doesn't have any infection, but has fluid that is coming from two areas of her incision. She states that she also saw an orthotist who is fabricating a custom AFO which should be ready next week.  Patient/Family Comments/goals: Pt to dc home with home health.  Pain/Comfort:  Pain Rating 1: 0/10  Pain Addressed 1: Pre-medicate for activity      Objective:     Communicated with nursing prior to session.  Patient found HOB elevated with oxygen, wound vac upon PT entry to room.     General Precautions: Standard, fall  Orthopedic Precautions: RLE weight bearing as tolerated, RLE posterior precautions  Braces:  (L foot drop brace)  Respiratory Status: Nasal cannula, flow 2 L/min     Functional Mobility:  Bed Mobility:     Rolling Left:   modified independence  Scooting: modified independence  Supine to Sit: supervision  Transfers:     Sit to Stand:  supervision with rolling walker and left foot drop orthosis  Bed to Chair: stand by assistance with  rolling walker  using  Stand Pivot  Gait: Pt ambulated x 30 ft using RW/wc trail/O2 with SBA.      AM-PAC 6 CLICK MOBILITY  Turning over in bed (including adjusting bedclothes, sheets and blankets)?: 4  Sitting down on and standing up from a chair with arms (e.g., wheelchair, bedside commode, etc.): 4  Moving from lying on back to sitting on the side of the bed?: 4  Moving to and from a bed to a chair (including a wheelchair)?: 4  Need to walk in hospital room?: 3  Climbing 3-5 steps with a railing?: 1  Basic Mobility Total Score: 20       Treatment & Education:  Pt transferred from supine to sit and then from bed to wc. She then ambulated in the hospitals hallway as stated above. In parallel bars, pt performed standing ther ex RLE: mini squats x 10, marching x 10, PF x 20, hip ext x 10. Seated ther ex BLE: resisted knee flex using red tband x 15, LAQ 2# X 15. Pt required 2 seated rest breaks during standing ther ex.    Patient left up in chair with all lines intact..    GOALS:   Multidisciplinary Problems       Physical Therapy Goals          Problem: Physical Therapy    Goal Priority Disciplines Outcome Goal Variances Interventions   Physical Therapy Goal     PT, PT/OT Ongoing, Progressing     Description: STG - 3 weeks  1. Pt min A x 1 with bed mob.-MET  2. Pt min A x 1  with transfers using RW.-MET  3. Pt will amb 25 ft with min A x 1 using RW.-PROGRESSING  4. Pt will have 3/5 RLE strength.-MET  5. Pt will recall 3/3 SHYANNE precautions.-MET    LTG - 6 weeks  1. Pt SBA with bed mob.-PROGRESSING  2. Pt SBA with transfers using RW.-PROGRESSING  3. Pt will amb 50 ft with SBA using RW.-PROGRESSING  4. Pt will have 4-/5 RLE strength.-PROGRESSING                       Time Tracking:     PT Received On: 03/09/23  PT  Start Time: 0939     PT Stop Time: 1016  PT Total Time (min): 37 min     Billable Minutes: Gait Training 10, Therapeutic Activity 7, Therapeutic Exercise 20, and Total Time 37 min    Treatment Type: Treatment  PT/PTA: PT     PTA Visit Number: 0     03/09/2023

## 2023-03-09 NOTE — SUBJECTIVE & OBJECTIVE
Interval History: no major issues, wound vac remains.     Review of Systems   Respiratory:  Negative for shortness of breath.    Cardiovascular:  Negative for chest pain.   Gastrointestinal:  Negative for abdominal pain, nausea and vomiting.   Musculoskeletal:  Positive for gait problem.   Neurological:  Positive for weakness.   Psychiatric/Behavioral:  Negative for agitation and confusion.    All other systems reviewed and are negative.  Objective:     Vital Signs (Most Recent):  Temp: 97.4 °F (36.3 °C) (03/09/23 0809)  Pulse: 63 (03/09/23 0809)  Resp: 18 (03/09/23 0905)  BP: (!) 138/39 (03/09/23 0809)  SpO2: 96 % (03/09/23 0809)   Vital Signs (24h Range):  Temp:  [97.4 °F (36.3 °C)-98.2 °F (36.8 °C)] 97.4 °F (36.3 °C)  Pulse:  [63-80] 63  Resp:  [17-22] 18  SpO2:  [94 %-99 %] 96 %  BP: (127-138)/(35-49) 138/39     Weight:  (unable to weight)  Body mass index is 22.86 kg/m².    Intake/Output Summary (Last 24 hours) at 3/9/2023 1156  Last data filed at 3/9/2023 0629  Gross per 24 hour   Intake 1057 ml   Output 150 ml   Net 907 ml      Physical Exam  Vitals reviewed.   Constitutional:       General: She is not in acute distress.     Appearance: Normal appearance.   HENT:      Head: Normocephalic and atraumatic.   Eyes:      General: No scleral icterus.     Extraocular Movements: Extraocular movements intact.      Conjunctiva/sclera: Conjunctivae normal.      Pupils: Pupils are equal, round, and reactive to light.   Cardiovascular:      Rate and Rhythm: Normal rate and regular rhythm.      Heart sounds: No murmur heard.    No friction rub. No gallop.   Pulmonary:      Effort: Pulmonary effort is normal. No respiratory distress.      Breath sounds: Normal breath sounds. No wheezing or rales.   Abdominal:      General: Abdomen is flat. Bowel sounds are normal. There is no distension.      Palpations: Abdomen is soft.      Tenderness: There is no abdominal tenderness. There is no guarding.   Musculoskeletal:          General: No swelling.      Comments: Hip pain ok    Skin:     General: Skin is warm and dry.      Coloration: Skin is not jaundiced.      Findings: No rash.      Comments: Wound vac   Neurological:      General: No focal deficit present.      Mental Status: She is alert and oriented to person, place, and time.      Sensory: No sensory deficit.      Motor: Weakness present.   Psychiatric:         Mood and Affect: Mood normal.         Behavior: Behavior normal.         Thought Content: Thought content normal.       Significant Labs: All pertinent labs within the past 24 hours have been reviewed.  Recent Lab Results         03/09/23  1105   03/09/23  0605   03/08/23  2043   03/08/23  1726        POC Glucose 237   154   190   185               Significant Imaging: I have reviewed all pertinent imaging results/findings within the past 24 hours.

## 2023-03-10 VITALS
HEIGHT: 67 IN | SYSTOLIC BLOOD PRESSURE: 142 MMHG | WEIGHT: 145.94 LBS | TEMPERATURE: 98 F | DIASTOLIC BLOOD PRESSURE: 39 MMHG | BODY MASS INDEX: 22.91 KG/M2 | RESPIRATION RATE: 18 BRPM | OXYGEN SATURATION: 97 % | HEART RATE: 61 BPM

## 2023-03-10 PROBLEM — Z98.890 STATUS POST HIP SURGERY: Status: RESOLVED | Noted: 2023-02-13 | Resolved: 2023-03-10

## 2023-03-10 PROBLEM — R11.0 NAUSEA: Status: RESOLVED | Noted: 2023-02-15 | Resolved: 2023-03-10

## 2023-03-10 LAB
ANION GAP SERPL CALCULATED.3IONS-SCNC: 5 MMOL/L (ref 7–16)
BASOPHILS # BLD AUTO: 0.05 K/UL (ref 0–0.2)
BASOPHILS NFR BLD AUTO: 0.6 % (ref 0–1)
BUN SERPL-MCNC: 24 MG/DL (ref 7–18)
BUN/CREAT SERPL: 28 (ref 6–20)
CALCIUM SERPL-MCNC: 8.5 MG/DL (ref 8.5–10.1)
CHLORIDE SERPL-SCNC: 103 MMOL/L (ref 98–107)
CO2 SERPL-SCNC: 39 MMOL/L (ref 21–32)
CREAT SERPL-MCNC: 0.87 MG/DL (ref 0.55–1.02)
DIFFERENTIAL METHOD BLD: ABNORMAL
EGFR (NO RACE VARIABLE) (RUSH/TITUS): 67 ML/MIN/1.73M²
EOSINOPHIL # BLD AUTO: 0.21 K/UL (ref 0–0.5)
EOSINOPHIL NFR BLD AUTO: 2.5 % (ref 1–4)
ERYTHROCYTE [DISTWIDTH] IN BLOOD BY AUTOMATED COUNT: 17.6 % (ref 11.5–14.5)
GLUCOSE SERPL-MCNC: 143 MG/DL (ref 70–105)
GLUCOSE SERPL-MCNC: 146 MG/DL (ref 74–106)
GLUCOSE SERPL-MCNC: 233 MG/DL (ref 70–105)
HCT VFR BLD AUTO: 33.2 % (ref 38–47)
HGB BLD-MCNC: 9.5 G/DL (ref 12–16)
INR BLD: 1.54
LYMPHOCYTES # BLD AUTO: 1.33 K/UL (ref 1–4.8)
LYMPHOCYTES NFR BLD AUTO: 16 % (ref 27–41)
MCH RBC QN AUTO: 27.8 PG (ref 27–31)
MCHC RBC AUTO-ENTMCNC: 28.6 G/DL (ref 32–36)
MCV RBC AUTO: 97.1 FL (ref 80–96)
MONOCYTES # BLD AUTO: 0.76 K/UL (ref 0–0.8)
MONOCYTES NFR BLD AUTO: 9.1 % (ref 2–6)
MPC BLD CALC-MCNC: 9.5 FL (ref 9.4–12.4)
NEUTROPHILS # BLD AUTO: 5.97 K/UL (ref 1.8–7.7)
NEUTROPHILS NFR BLD AUTO: 71.8 % (ref 53–65)
PLATELET # BLD AUTO: 316 K/UL (ref 150–400)
POTASSIUM SERPL-SCNC: 5.7 MMOL/L (ref 3.5–5.1)
PROTHROMBIN TIME: 18.5 SECONDS (ref 11.7–14.7)
RBC # BLD AUTO: 3.42 M/UL (ref 4.2–5.4)
SODIUM SERPL-SCNC: 141 MMOL/L (ref 136–145)
WBC # BLD AUTO: 8.32 K/UL (ref 4.5–11)

## 2023-03-10 PROCEDURE — 80048 BASIC METABOLIC PNL TOTAL CA: CPT | Performed by: NURSE PRACTITIONER

## 2023-03-10 PROCEDURE — 27000221 HC OXYGEN, UP TO 24 HOURS

## 2023-03-10 PROCEDURE — 63600175 PHARM REV CODE 636 W HCPCS

## 2023-03-10 PROCEDURE — 25000003 PHARM REV CODE 250: Performed by: HOSPITALIST

## 2023-03-10 PROCEDURE — 99900035 HC TECH TIME PER 15 MIN (STAT)

## 2023-03-10 PROCEDURE — 25000003 PHARM REV CODE 250

## 2023-03-10 PROCEDURE — 85025 COMPLETE CBC W/AUTO DIFF WBC: CPT | Performed by: NURSE PRACTITIONER

## 2023-03-10 PROCEDURE — 99316 NF DSCHRG MGMT 30 MIN+: CPT | Mod: ,,, | Performed by: HOSPITALIST

## 2023-03-10 PROCEDURE — 27000988

## 2023-03-10 PROCEDURE — 85610 PROTHROMBIN TIME: CPT

## 2023-03-10 PROCEDURE — 97116 GAIT TRAINING THERAPY: CPT | Mod: CQ

## 2023-03-10 PROCEDURE — 97110 THERAPEUTIC EXERCISES: CPT | Mod: CQ

## 2023-03-10 PROCEDURE — 97530 THERAPEUTIC ACTIVITIES: CPT | Mod: CQ

## 2023-03-10 PROCEDURE — 99316 PR NURSING FAC DISCHRGE DAY,MORE 30 MIN: ICD-10-PCS | Mod: ,,, | Performed by: HOSPITALIST

## 2023-03-10 PROCEDURE — 82962 GLUCOSE BLOOD TEST: CPT

## 2023-03-10 RX ADMIN — ZINC OXIDE TOPICAL OINT.: at 09:03

## 2023-03-10 RX ADMIN — INSULIN ASPART 2 UNITS: 100 INJECTION, SOLUTION INTRAVENOUS; SUBCUTANEOUS at 11:03

## 2023-03-10 RX ADMIN — OXYCODONE HYDROCHLORIDE 10 MG: 10 TABLET ORAL at 11:03

## 2023-03-10 RX ADMIN — DIGOXIN 0.12 MG: 125 TABLET ORAL at 09:03

## 2023-03-10 RX ADMIN — ROPINIROLE 0.25 MG: 0.25 TABLET, FILM COATED ORAL at 09:03

## 2023-03-10 RX ADMIN — LEVOTHYROXINE SODIUM 25 MCG: 0.03 TABLET ORAL at 06:03

## 2023-03-10 RX ADMIN — OXYCODONE HYDROCHLORIDE 10 MG: 10 TABLET ORAL at 03:03

## 2023-03-10 RX ADMIN — FUROSEMIDE 40 MG: 40 TABLET ORAL at 09:03

## 2023-03-10 RX ADMIN — Medication 1 TABLET: at 09:03

## 2023-03-10 RX ADMIN — PREDNISONE 5 MG: 5 TABLET ORAL at 09:03

## 2023-03-10 RX ADMIN — AMIODARONE HYDROCHLORIDE 200 MG: 200 TABLET ORAL at 09:03

## 2023-03-10 RX ADMIN — FAMOTIDINE 20 MG: 20 TABLET, FILM COATED ORAL at 09:03

## 2023-03-10 NOTE — PLAN OF CARE
03/10/23 1256   Final Note   Assessment Type Final Discharge Note   Anticipated Discharge Disposition Home-Health   What phone number can be called within the next 1-3 days to see how you are doing after discharge? [phone]   Hospital Resources/Appts/Education Provided Provided patient/caregiver with written discharge plan information;Appointments scheduled and added to AVS   Post-Acute Status   Post-Acute Authorization Home Health   Discharge Delays None known at this time     Mrs. Rojas discharging home with home health. Patient has daughter that lives with her. Patient states she has all necessary equipment. Dc instructions sent to [facility].

## 2023-03-10 NOTE — PT/OT/SLP PROGRESS
Physical Therapy Treatment    Patient Name:  Karina Rojas   MRN:  [id number]    Recommendations:     Discharge Recommendations: home with home health  Discharge Equipment Recommendations: none  Barriers to discharge: Decreased caregiver support    Assessment:     Karina Rojas is a 82 y.o. female admitted with a medical diagnosis of Status post hip surgery.  She presents with the following impairments/functional limitations: impaired endurance, weakness, impaired self care skills, impaired functional mobility, gait instability, impaired balance, impaired skin, impaired cardiopulmonary response to activity, orthopedic precautions. Treatment performed in room today due to patient stating that she might need to return to the room quickly to use the restroom due to her having diarrhea last night. Patient was provided with visual and verbal cues for proper form of therapeutic exercises. PTA donned L foot drop brace for gait training, but doffed at the end of treatment when patient returned to bed. Patient stated that her R knee started feeling weak during gait training. Patient with no adverse effects after completion of treatment.     Rehab Prognosis: Fair; patient would benefit from acute skilled PT services to address these deficits and reach maximum level of function.    Recent Surgery: * No surgery found *      Plan:     During this hospitalization, patient to be seen 5 x/week to address the identified rehab impairments via gait training, therapeutic activities, therapeutic exercises and progress toward the following goals:    Plan of Care Expires:  03/24/23    Subjective     Chief Complaint: Patient reported that her R knee started feeling weak due to arthritis during gait training.   Patient/Family Comments/goals: Pt to dc home with family and home health when stablized.   Pain/Comfort:  Pain Rating 1: 0/10      Objective:     Communicated with pt prior to session. Patient found HOB elevated with oxygen,  wound vac upon PTA entry to room.     General Precautions: Standard, fall  Orthopedic Precautions: RLE weight bearing as tolerated, RLE posterior precautions (No AROM R hip ABD)  Braces:  (L foot drop brace)  Respiratory Status: Nasal cannula, flow 2 L/min     Functional Mobility:  Bed Mobility:     Sit to Supine: minimum assistance  Transfers:     Sit to Stand:  supervision and stand by assistance with rolling walker  Bed to Chair: stand by assistance with  rolling walker  using  Step Transfer  Gait: Pt amb'd with RW/O2/left foot drop brace x 20 ft +14ft with SBA/ CGA.  Patient performed dynamic and static sitting balance for dressing, and while PTA applied bandage to L heel.       AM-PAC 6 CLICK MOBILITY  Turning over in bed (including adjusting bedclothes, sheets and blankets)?: 4  Sitting down on and standing up from a chair with arms (e.g., wheelchair, bedside commode, etc.): 4  Moving from lying on back to sitting on the side of the bed?: 4  Moving to and from a bed to a chair (including a wheelchair)?: 4  Need to walk in hospital room?: 4  Climbing 3-5 steps with a railing?: 1  Basic Mobility Total Score: 21       Treatment & Education:  Patient performed the following therapeutic exercises: seated marching on LLE 20x, LAQs 20x BLE, hip ADD 20x, glute sets 20x      Patient left HOB elevated with heels floating all lines intact and RN and SWB coordinator present.    GOALS:   Multidisciplinary Problems       Physical Therapy Goals          Problem: Physical Therapy    Goal Priority Disciplines Outcome Goal Variances Interventions   Physical Therapy Goal     PT, PT/OT Ongoing, Progressing     Description: STG - 3 weeks  1. Pt min A x 1 with bed mob.-MET  2. Pt min A x 1  with transfers using RW.-MET  3. Pt will amb 25 ft with min A x 1 using RW.-PROGRESSING  4. Pt will have 3/5 RLE strength.-MET  5. Pt will recall 3/3 SHYANNE precautions.-MET    LTG - 6 weeks  1. Pt SBA with bed mob.-PROGRESSING  2. Pt SBA with  transfers using RW.-PROGRESSING  3. Pt will amb 50 ft with SBA using RW.-PROGRESSING  4. Pt will have 4-/5 RLE strength.-PROGRESSING                       Time Tracking:     PT Received On: 03/10/23  PT Start Time: 1035     PT Stop Time: 1114  PT Total Time (min): 39 min     Billable Minutes: Gait Training 12, Therapeutic Activity 12, and Therapeutic Exercise 15    Treatment Type: Treatment  PT/PTA: PTA     PTA Visit Number: 1     03/10/2023

## 2023-03-10 NOTE — DISCHARGE SUMMARY
[facility]  Hospital Medicine  Discharge Summary      Patient Name: Karina Rojas  MRN: [id number]  [place]: [id number]  Patient Class: IP- Swing  Admission Date: 2/10/2023  Hospital Length of Stay: 28 days  Discharge Date and Time:  03/10/2023 11:45 AM  Attending Physician: Jorge Buckley DO   Discharging Provider: Jorge Buckley DO  Primary Care Provider: Thalia Bowie NP    Primary Care Team: Networked reference to record PCT     HPI:   Patient is a 82-year-old ill-appearing  female who is admitted to [facility] for skilled nursing services status post right intratrochanteric for mole nail removal and subsequent right total hip replacement with ORIF.  Patient is here for physical and occupational therapy services.  Patient's surgical procedure took place [facility] in [place] on February 9, 2023 by Dr. Teofilo Lewis.      * No surgery found *      Hospital Course:   2/13 2000 Called by RICARDO Galdamez LPN that Ms Rojas is having N/V and  BP 81/40.  Ms Rojas reports she has had intermittent NV since surg last week.  She is able to take sips of PO fluids but continues to have nausea.  Ms Rojas states her BP is always low 100s/40-50s.  She denies pain.  She denies diarrhea.  Will treat with 250 ml bolus, Zofran and continue to monitor.     2/15 Nausea seems better today, not vomiting but just nauseated.  Did eat lunch. Pain seems controlled.      2/20 Overall seems better, however, she has now developed R foot drop with decreased dorsiflexion.  Otherwise nausea seems better    2/27 Doing good today, still with foot drop, but working with therapy with brace. Arterial US done showing severe PAD which is known.     3/2 Went to surgeon for follow ended up with wound vac on incision site.  Otherwise doing well.     3/9 Had follow up yesterday for wound vac and surgery eval, continue wound vac, otherwise improving with therapy     3/10  better, doing well.  DC today, has follow up.  Has reached max benefit from sWB.        Goals of Care Treatment Preferences:  Code Status: Full Code      Consults:   Consults (From admission, onward)        Status Ordering Provider     Inpatient consult to Registered Dietitian/Nutritionist  Once        Provider:  (Not yet assigned)    Completed CARISSA DIAZ          No new Assessment & Plan notes have been filed under this hospital service since the last note was generated.  Service: Hospital Medicine    Final Active Diagnoses:    Diagnosis Date Noted POA    Peripheral arterial disease [I73.9] 02/27/2023 Yes    Foot drop, right foot [M21.371] 02/20/2023 No    Chronic pain syndrome [G89.4] 02/13/2023 Yes    Atrial fibrillation [I48.91] 01/04/2023 Yes    Diabetes [E11.9] 01/04/2023 Yes    HTN (hypertension) [I10] 01/04/2023 Yes      Problems Resolved During this Admission:    Diagnosis Date Noted Date Resolved POA    PRINCIPAL PROBLEM:  Status post hip surgery [Z98.890] 02/13/2023 03/10/2023 Not Applicable    Nausea [R11.0] 02/15/2023 03/10/2023 No       Discharged Condition: good    Disposition: Home-Health Care [facility]    Follow Up:   Follow-up Information     Nino Bazan MD Follow up on 4/24/2023.    Specialty: Vascular Surgery  Why: APPOINTMENT TIME- 2:30 pm.  Contact information:  [address] [place] [address] [address]  [facility]  [place] [address]  [phone]             Teofilo Lewis MD Follow up on 3/15/2023.    Specialty: Orthopedic Surgery  Why: APPOINTMENT TIME: 1:15 PM.  Contact information:  [address] [facility]  [place] [address]  [phone]             [facility] Follow up on 3/10/2023.    Why: PATIENT HAS A WOUND VAC TO INCISION ON RIGHT HIP.PLACED ON 03/01/23. LEAVE IN PLACE  UNTIL NEXT APPOINTMENT WITH DR. LEWIS.  PT/OT.  Contact information:  [address]  [place] [address]  [phone]                       Patient  Instructions:   No discharge procedures on file.    Significant Diagnostic Studies: Labs:   BMP:   Recent Labs   Lab 03/10/23  0555   *      K 5.7*      CO2 39*   BUN 24*   CREATININE 0.87   CALCIUM 8.5       Pending Diagnostic Studies:     Procedure Component Value Units Date/Time    Protime-INR [[id number]]     Order Status: Sent Lab Status: No result     Specimen: Blood          Medications:  Reconciled Home Medications:      Medication List      CHANGE how you take these medications    levothyroxine 50 MCG tablet  Commonly known as: SYNTHROID  Take 1 tablet (50 mcg total) by mouth before breakfast.  What changed: how much to take        CONTINUE taking these medications    amiodarone 200 MG Tab  Commonly known as: PACERONE  Take 200 mg by mouth 2 (two) times daily. Take 1 tablet by mouth in the morning and 1 tablet before bedtime.     atorvastatin 40 MG tablet  Commonly known as: LIPITOR  Take 40 mg by mouth.     digoxin 125 mcg tablet  Commonly known as: LANOXIN  Take 125 mcg by mouth.     furosemide 40 MG tablet  Commonly known as: LASIX  Take 40 mg by mouth once daily.     metFORMIN 1000 MG tablet  Commonly known as: GLUCOPHAGE  Take 1,000 mg by mouth daily with breakfast.     predniSONE 5 MG tablet  Commonly known as: DELTASONE  Take 5 mg by mouth.     TRELEGY ELLIPTA INHL  Inhale into the lungs daily as needed.     * warfarin 5 MG tablet  Commonly known as: COUMADIN  Take by mouth.     * warfarin 4 MG tablet  Commonly known as: COUMADIN  Take 4 mg by mouth. Take 1 tablet by mouth 4 times a week.         * This list has 2 medication(s) that are the same as other medications prescribed for you. Read the directions carefully, and ask your doctor or other care provider to review them with you.            STOP taking these medications    oxyCODONE 10 mg 12 hr tablet  Commonly known as: OxyCONTIN            Indwelling Lines/Drains at time of discharge:   Lines/Drains/Airways     None                  Time spent on the discharge of patient: 35 minutes         Jorge Buckley DO  Department of Hospital Medicine  [facility]

## 2023-03-10 NOTE — PLAN OF CARE
Problem: Adult Inpatient Plan of Care  Goal: Plan of Care Review  3/10/2023 1150 by Rosangela Carter RN  Outcome: Met  3/10/2023 1150 by Rosangela Carter RN  Outcome: Adequate for Care Transition  3/10/2023 0735 by Rosangela Carter RN  Outcome: Ongoing, Progressing  Goal: Patient-Specific Goal (Individualized)  3/10/2023 1150 by Rosangela Carter, RN  Outcome: Met  3/10/2023 1150 by Rosangela Carter RN  Outcome: Adequate for Care Transition  3/10/2023 0735 by Rosangela Carter RN  Outcome: Ongoing, Progressing  Goal: Absence of Hospital-Acquired Illness or Injury  3/10/2023 1150 by Rosangela Carter RN  Outcome: Met  3/10/2023 1150 by Rosangela Carter RN  Outcome: Adequate for Care Transition  3/10/2023 0735 by Rosangela Carter RN  Outcome: Ongoing, Progressing  Goal: Optimal Comfort and Wellbeing  3/10/2023 1150 by Rosangela Carter RN  Outcome: Met  3/10/2023 1150 by Rosangela Carter RN  Outcome: Adequate for Care Transition  3/10/2023 0735 by Rosangela Carter RN  Outcome: Ongoing, Progressing  Goal: Readiness for Transition of Care  3/10/2023 1150 by Rosangela Carter RN  Outcome: Met  3/10/2023 1150 by Rosangela Carter RN  Outcome: Adequate for Care Transition  3/10/2023 0735 by Rosangela Carter RN  Outcome: Ongoing, Progressing     Problem: Diabetes Comorbidity  Goal: Blood Glucose Level Within Targeted Range  3/10/2023 1150 by Rosangela Carter, RN  Outcome: Met  3/10/2023 1150 by Rosangela Carter RN  Outcome: Adequate for Care Transition  3/10/2023 0735 by Rosangela Carter RN  Outcome: Ongoing, Progressing     Problem: Fall Injury Risk  Goal: Absence of Fall and Fall-Related Injury  3/10/2023 1150 by Rosangela Carter, RN  Outcome: Met  3/10/2023 1150 by Rosangela Carter RN  Outcome: Adequate for Care Transition  3/10/2023 0735 by Rosangela Carter RN  Outcome: Ongoing, Progressing     Problem: Pain Acute  Goal: Acceptable Pain Control and Functional Ability  3/10/2023 1150 by Rosangela Carter, RN  Outcome: Met  3/10/2023 1150 by Rosangela Miles, RN  Outcome: Adequate for Care Transition  3/10/2023 0735 by  Rosangela Carter RN  Outcome: Ongoing, Progressing     Problem: Impaired Wound Healing  Goal: Optimal Wound Healing  3/10/2023 1150 by Rosangela Carter RN  Outcome: Met  3/10/2023 1150 by Rosangela Carter RN  Outcome: Adequate for Care Transition  3/10/2023 0735 by Rosangela Carter RN  Outcome: Ongoing, Progressing     Problem: Skin Injury Risk Increased  Goal: Skin Health and Integrity  3/10/2023 1150 by Rosangela Carter RN  Outcome: Met  3/10/2023 1150 by Rosangela Carter RN  Outcome: Adequate for Care Transition  3/10/2023 0735 by Rosangela Carter RN  Outcome: Ongoing, Progressing     Problem: Physical Therapy  Goal: Physical Therapy Goal  Description: STG - 3 weeks  1. Pt min A x 1 with bed mob.-MET  2. Pt min A x 1  with transfers using RW.-MET  3. Pt will amb 25 ft with min A x 1 using RW.-PROGRESSING  4. Pt will have 3/5 RLE strength.-MET  5. Pt will recall 3/3 SHYANNE precautions.-MET    LTG - 6 weeks  1. Pt SBA with bed mob.-PROGRESSING  2. Pt SBA with transfers using RW.-PROGRESSING  3. Pt will amb 50 ft with SBA using RW.-PROGRESSING  4. Pt will have 4-/5 RLE strength.-PROGRESSING  Outcome: Met     Problem: Occupational Therapy  Goal: Occupational Therapy Goal  Description: STG: within 2 weeks  Pt will perform grooming with setup at sinkside MET  Pt will bathe with min a at sinkside MET  Pt will perform UE dressing with svn at sinkside MET  Pt will perform LE dressing with min a sinkside MOD A  Pt will sit EOB x 30 min with min assistance MET  Pt will transfer bed/chair/bsc with SBA and RW ONGOING/PROGRESSING with PT  Pt will perform standing task x 3 min with CGA/SBA assistance ONGOING/PROGRESSING with PT  Pt will tolerate 35 minutes of tx without fatigue ONGOING/PROGRESSING with PT      LT.Restore to max I with self care and mobility.    Pt is refusing occupational therapy due to she is independently performing HOME EXERCISE PROGRAM of red theratubing and red theraputty for hand strength and UB strength at bedside.  She is  refusing occupational therapy attempts to remediate her LB self care, but this is of old onset, as last episode of care, she refused frequently LB AD's.  Pt reports she is content to continue with PT care, but she would like to discontinue OT unless she decides she needs it at later date.     Outcome: Met     Problem: Gas Exchange Impaired  Goal: Optimal Gas Exchange  3/10/2023 1150 by Rosangela Carter, RN  Outcome: Met  3/10/2023 1150 by Rosangela Carter RN  Outcome: Adequate for Care Transition

## 2023-03-13 ENCOUNTER — PATIENT OUTREACH (OUTPATIENT)
Dept: ADMINISTRATIVE | Facility: CLINIC | Age: 83
End: 2023-03-13

## 2023-03-13 NOTE — PROGRESS NOTES
Wound Assessment(s)  Wound #1 Left Heel is a chronic Unstageable Pressure Injury Obscured full-thickness skin and tissue loss Pressure Ulcer and has received a status of Not Healed. Initial wound encounter measurements are 3cm length x 2.8cm width x 0.01cm depth, with an area of 8.4 sq cm and a volume of 0.084 cubic cm. Necrotic adipose is exposed. No tunneling has been noted. No sinus tract has been noted. No undermining has been noted. There was no drainage noted. The patient reports a wound pain of level 4/10. Wound bed has Yes epithelialization, Yes eschar, No slough, No granulation.  The periwound skin exhibited: Dry/Scaly. The periwound skin did not exhibit: Brawny Induration, Edema, Excoriation, Induration, Callus, Crepitus, Fluctuance, Friable, Rash, Denuded, Moist, Maceration, Atrophie [place], Cyanosis, Ecchymosis, Erythema, Hemosiderosis, Pallor, Rubor. The temperature of the periwound skin is WNL. Periwound skin does not exhibit signs or symptoms of infection. Local Pulse is Doppler.    Wound #2 Right Heel is a chronic Deep Tissue Pressure Injury Persistent non-blanchable deep red, maroon or purple discoloration Pressure Ulcer and has received a status of Not Healed. Initial wound encounter measurements are 2.3cm length x 3.8cm width x 0.01cm depth, with an area of 8.74 sq cm and a volume of 0.087 cubic cm. Necrotic adipose is exposed. No tunneling has been noted. No sinus tract has been noted. No undermining has been noted. There was no drainage noted. The patient reports a wound pain of level 4/10. The wound margin is undefined. Wound bed has Yes epithelialization, Yes eschar, No slough, No granulation.  The periwound skin exhibited: Dry/Scaly. The periwound skin did not exhibit: Brawny Induration, Edema, Excoriation, Induration, Callus, Crepitus, Fluctuance, Friable, Rash, Denuded, Moist, Maceration, Atrophie [place], Cyanosis, Ecchymosis, Erythema, Hemosiderosis, Pallor, Rubor. The temperature of  the periwound skin is WNL. Periwound skin does not exhibit signs or symptoms of infection. Local Pulse is Doppler.    Wound #3 Left, Dorsal Foot is an acute Eschar covered Arterial Ulcer and has received a status of Not Healed. Initial wound encounter measurements are 2.4cm length x 1.2cm width x 0.01cm depth, with an area of 2.88 sq cm and a volume of 0.029 cubic cm. Necrotic adipose is exposed. No tunneling has been noted. No sinus tract has been noted. No undermining has been noted. There was no drainage noted. The patient reports a wound pain of level 0/10. Wound bed has No epithelialization, Yes eschar, No slough, No granulation.  The periwound skin exhibited: Dry/Scaly. The periwound skin did not exhibit: Brawny Induration, Edema, Excoriation, Induration, Callus, Crepitus, Fluctuance, Friable, Rash, Denuded, Moist, Maceration, Atrophie [place], Cyanosis, Ecchymosis, Erythema, Hemosiderosis, Pallor, Rubor. The temperature of the periwound skin is WNL. Periwound skin does not exhibit signs or symptoms of infection. Local Pulse is Absent.    Additional Information  Limited to Skin Breakdown?: No  Limited to Skin Breakdown?: No  Limited to Skin Breakdown?: No  Wound Orders:  Wound #1 Left Heel    Hand Hygiene/Smoking Cessation  Wash hands before and after wound care. Call the Wound Center at [phone] if you have signs or symptoms of infection, increased drainage, increasing odor, or unusual redness. After Wound Care hours, please notify your PCP or go to the ER.    Dressings  Apply dressing - Apply betadine and foam border and change every other day.    Off-Loading  Use/Wear when Walking: - Foam border  Turn every 2 hours. Avoid position directing pressure to Wound site. Limit side lying to 30 degree tilt. Limit HOB elevation to 30 degrees in bed.    Follow-Up Appointments  Return Appointment 1 week  Other Orders: - Refer to Dr. Bazan for vascular evaluation- appt scheduled    Scribing Attestation  I attest,  as the nurse, that I scribed these orders for the physician.  I, as the physician, have reviewed the orders scribed by the center RN's and agree.    Wound #2 Right Heel    Dressings  Apply dressing - Apply betadine and foam border and change every other day.    Off-Loading  Use/Wear when Walking: - Foam border  Turn every 2 hours. Avoid position directing pressure to Wound site. Limit side lying to 30 degree tilt. Limit HOB elevation to 30 degrees in bed.    Follow-Up Appointments  Return Appointment 1 week  Other Orders: - Refer to Dr. Bazan for vascular evaluation April 24 at 2:30    Scribing Attestation  I attest, as the nurse, that I scribed these orders for the physician.  I, as the physician, have reviewed the orders scribed by the center RN's and agree.    Wound #3 Left, Dorsal Foot    Dressings  Apply dressing - Apply betadine and foam border and change every other day.

## 2023-03-13 NOTE — PROGRESS NOTES
C3 nurse attempted to contact Karina Rojas   for a [facility] post hospital discharge follow up call. No answer. The patient does not have a scheduled HOSFU appointment, and the pt does not have an [facility] PCP.

## 2023-03-20 ENCOUNTER — LAB REQUISITION (OUTPATIENT)
Dept: LAB | Facility: HOSPITAL | Age: 83
End: 2023-03-20
Attending: NURSE PRACTITIONER
Payer: MEDICARE

## 2023-03-20 DIAGNOSIS — R94.6 ABNORMAL RESULTS OF THYROID FUNCTION STUDIES: ICD-10-CM

## 2023-03-20 LAB — TSH SERPL DL<=0.005 MIU/L-ACNC: 7.93 UIU/ML (ref 0.36–3.74)

## 2023-03-20 PROCEDURE — 84443 ASSAY THYROID STIM HORMONE: CPT | Performed by: NURSE PRACTITIONER

## 2023-04-24 ENCOUNTER — LAB REQUISITION (OUTPATIENT)
Dept: LAB | Facility: HOSPITAL | Age: 83
End: 2023-04-24
Attending: NURSE PRACTITIONER
Payer: MEDICARE

## 2023-04-24 DIAGNOSIS — I11.0 HYPERTENSIVE HEART DISEASE WITH HEART FAILURE: ICD-10-CM

## 2023-04-24 LAB
ANION GAP SERPL CALCULATED.3IONS-SCNC: 9 MMOL/L (ref 7–16)
BUN SERPL-MCNC: 41 MG/DL (ref 7–18)
BUN/CREAT SERPL: 36 (ref 6–20)
CALCIUM SERPL-MCNC: 9 MG/DL (ref 8.5–10.1)
CHLORIDE SERPL-SCNC: 96 MMOL/L (ref 98–107)
CO2 SERPL-SCNC: 36 MMOL/L (ref 21–32)
CREAT SERPL-MCNC: 1.14 MG/DL (ref 0.55–1.02)
EGFR (NO RACE VARIABLE) (RUSH/TITUS): 49 ML/MIN/1.73M²
GLUCOSE SERPL-MCNC: 288 MG/DL (ref 74–106)
POTASSIUM SERPL-SCNC: 5.3 MMOL/L (ref 3.5–5.1)
SODIUM SERPL-SCNC: 136 MMOL/L (ref 136–145)

## 2023-04-24 PROCEDURE — 80048 BASIC METABOLIC PNL TOTAL CA: CPT

## 2023-06-13 ENCOUNTER — HOSPITAL ENCOUNTER (INPATIENT)
Facility: HOSPITAL | Age: 83
LOS: 2 days | Discharge: HOME | DRG: 392 | End: 2023-06-16
Attending: HOSPITALIST | Admitting: HOSPITALIST
Payer: MEDICARE

## 2023-06-13 DIAGNOSIS — J44.9 COPD (CHRONIC OBSTRUCTIVE PULMONARY DISEASE): ICD-10-CM

## 2023-06-13 DIAGNOSIS — N30.00 ACUTE CYSTITIS WITHOUT HEMATURIA: ICD-10-CM

## 2023-06-13 DIAGNOSIS — R11.2 NAUSEA AND VOMITING, UNSPECIFIED VOMITING TYPE: Primary | ICD-10-CM

## 2023-06-13 DIAGNOSIS — E87.1 HYPONATREMIA: ICD-10-CM

## 2023-06-13 DIAGNOSIS — R79.89 ELEVATED BRAIN NATRIURETIC PEPTIDE (BNP) LEVEL: ICD-10-CM

## 2023-06-13 DIAGNOSIS — R11.10 VOMITING: ICD-10-CM

## 2023-06-13 DIAGNOSIS — R11.2 INTRACTABLE NAUSEA AND VOMITING: ICD-10-CM

## 2023-06-13 PROBLEM — Z91.81 AT RISK FOR FALLS: Status: ACTIVE | Noted: 2023-06-13

## 2023-06-13 LAB
ALBUMIN SERPL BCP-MCNC: 3.1 G/DL (ref 3.5–5)
ALBUMIN/GLOB SERPL: 0.7 {RATIO}
ALP SERPL-CCNC: 89 U/L (ref 55–142)
ALT SERPL W P-5'-P-CCNC: 24 U/L (ref 13–56)
ANION GAP SERPL CALCULATED.3IONS-SCNC: 9 MMOL/L (ref 7–16)
AST SERPL W P-5'-P-CCNC: 19 U/L (ref 15–37)
BACTERIA #/AREA URNS HPF: ABNORMAL /HPF
BASOPHILS # BLD AUTO: 0.02 K/UL (ref 0–0.2)
BASOPHILS NFR BLD AUTO: 0.2 % (ref 0–1)
BILIRUB SERPL-MCNC: 0.7 MG/DL (ref ?–1.2)
BILIRUB UR QL STRIP: NEGATIVE
BUN SERPL-MCNC: 19 MG/DL (ref 7–18)
BUN/CREAT SERPL: 18 (ref 6–20)
CALCIUM SERPL-MCNC: 9.6 MG/DL (ref 8.5–10.1)
CHLORIDE SERPL-SCNC: 96 MMOL/L (ref 98–107)
CLARITY UR: CLEAR
CO2 SERPL-SCNC: 32 MMOL/L (ref 21–32)
COLOR UR: YELLOW
CREAT SERPL-MCNC: 1.05 MG/DL (ref 0.55–1.02)
DIFFERENTIAL METHOD BLD: ABNORMAL
EGFR (NO RACE VARIABLE) (RUSH/TITUS): 53 ML/MIN/1.73M2
EOSINOPHIL # BLD AUTO: 0.05 K/UL (ref 0–0.5)
EOSINOPHIL NFR BLD AUTO: 0.4 % (ref 1–4)
ERYTHROCYTE [DISTWIDTH] IN BLOOD BY AUTOMATED COUNT: 16.5 % (ref 11.5–14.5)
GLOBULIN SER-MCNC: 4.3 G/DL (ref 2–4)
GLUCOSE SERPL-MCNC: 184 MG/DL (ref 70–105)
GLUCOSE SERPL-MCNC: 188 MG/DL (ref 74–106)
GLUCOSE UR STRIP-MCNC: 100 MG/DL
HCT VFR BLD AUTO: 37.2 % (ref 38–47)
HGB BLD-MCNC: 11.2 G/DL (ref 12–16)
KETONES UR STRIP-SCNC: NEGATIVE MG/DL
LACTATE SERPL-SCNC: 1.7 MMOL/L (ref 0.4–2)
LACTATE SERPL-SCNC: 2.5 MMOL/L (ref 0.4–2)
LEUKOCYTE ESTERASE UR QL STRIP: ABNORMAL
LIPASE SERPL-CCNC: 83 U/L (ref 73–393)
LYMPHOCYTES # BLD AUTO: 1.19 K/UL (ref 1–4.8)
LYMPHOCYTES NFR BLD AUTO: 9.8 % (ref 27–41)
MCH RBC QN AUTO: 25.5 PG (ref 27–31)
MCHC RBC AUTO-ENTMCNC: 30.1 G/DL (ref 32–36)
MCV RBC AUTO: 84.5 FL (ref 80–96)
MONOCYTES # BLD AUTO: 0.87 K/UL (ref 0–0.8)
MONOCYTES NFR BLD AUTO: 7.1 % (ref 2–6)
MPC BLD CALC-MCNC: 9.7 FL (ref 9.4–12.4)
NEUTROPHILS # BLD AUTO: 10.05 K/UL (ref 1.8–7.7)
NEUTROPHILS NFR BLD AUTO: 82.5 % (ref 53–65)
NITRITE UR QL STRIP: NEGATIVE
NT-PROBNP SERPL-MCNC: ABNORMAL PG/ML (ref 1–450)
PH UR STRIP: 7 PH UNITS
PLATELET # BLD AUTO: 383 K/UL (ref 150–400)
POTASSIUM SERPL-SCNC: 4.1 MMOL/L (ref 3.5–5.1)
PROT SERPL-MCNC: 7.4 G/DL (ref 6.4–8.2)
PROT UR QL STRIP: 30
RBC # BLD AUTO: 4.4 M/UL (ref 4.2–5.4)
RBC # UR STRIP: NEGATIVE /UL
RBC #/AREA URNS HPF: ABNORMAL /HPF
SODIUM SERPL-SCNC: 133 MMOL/L (ref 136–145)
SP GR UR STRIP: 1.01
SQUAMOUS #/AREA URNS LPF: ABNORMAL /LPF
TROPONIN I SERPL DL<=0.01 NG/ML-MCNC: 20.4 PG/ML
UROBILINOGEN UR STRIP-ACNC: 0.2 MG/DL
WBC # BLD AUTO: 12.18 K/UL (ref 4.5–11)
WBC #/AREA URNS HPF: ABNORMAL /HPF

## 2023-06-13 PROCEDURE — G0378 HOSPITAL OBSERVATION PER HR: HCPCS

## 2023-06-13 PROCEDURE — 84484 ASSAY OF TROPONIN QUANT: CPT | Performed by: NURSE PRACTITIONER

## 2023-06-13 PROCEDURE — 83880 ASSAY OF NATRIURETIC PEPTIDE: CPT | Performed by: NURSE PRACTITIONER

## 2023-06-13 PROCEDURE — 63600175 PHARM REV CODE 636 W HCPCS: Performed by: NURSE PRACTITIONER

## 2023-06-13 PROCEDURE — 87077 CULTURE AEROBIC IDENTIFY: CPT | Performed by: NURSE PRACTITIONER

## 2023-06-13 PROCEDURE — 25000242 PHARM REV CODE 250 ALT 637 W/ HCPCS: Performed by: NURSE PRACTITIONER

## 2023-06-13 PROCEDURE — 99285 EMERGENCY DEPT VISIT HI MDM: CPT | Mod: GF

## 2023-06-13 PROCEDURE — 83690 ASSAY OF LIPASE: CPT | Performed by: NURSE PRACTITIONER

## 2023-06-13 PROCEDURE — 93010 ELECTROCARDIOGRAM REPORT: CPT | Mod: ,,, | Performed by: INTERNAL MEDICINE

## 2023-06-13 PROCEDURE — 96368 THER/DIAG CONCURRENT INF: CPT

## 2023-06-13 PROCEDURE — 85025 COMPLETE CBC W/AUTO DIFF WBC: CPT | Performed by: NURSE PRACTITIONER

## 2023-06-13 PROCEDURE — 96365 THER/PROPH/DIAG IV INF INIT: CPT

## 2023-06-13 PROCEDURE — 94640 AIRWAY INHALATION TREATMENT: CPT

## 2023-06-13 PROCEDURE — 83605 ASSAY OF LACTIC ACID: CPT | Performed by: NURSE PRACTITIONER

## 2023-06-13 PROCEDURE — 27000221 HC OXYGEN, UP TO 24 HOURS

## 2023-06-13 PROCEDURE — 25000003 PHARM REV CODE 250: Performed by: NURSE PRACTITIONER

## 2023-06-13 PROCEDURE — 93010 EKG 12-LEAD: ICD-10-PCS | Mod: ,,, | Performed by: INTERNAL MEDICINE

## 2023-06-13 PROCEDURE — 81001 URINALYSIS AUTO W/SCOPE: CPT | Performed by: NURSE PRACTITIONER

## 2023-06-13 PROCEDURE — 96375 TX/PRO/DX INJ NEW DRUG ADDON: CPT

## 2023-06-13 PROCEDURE — 99285 EMERGENCY DEPT VISIT HI MDM: CPT | Mod: 25

## 2023-06-13 PROCEDURE — 93005 ELECTROCARDIOGRAM TRACING: CPT

## 2023-06-13 PROCEDURE — 96361 HYDRATE IV INFUSION ADD-ON: CPT

## 2023-06-13 PROCEDURE — 80053 COMPREHEN METABOLIC PANEL: CPT | Performed by: NURSE PRACTITIONER

## 2023-06-13 PROCEDURE — 82962 GLUCOSE BLOOD TEST: CPT

## 2023-06-13 RX ORDER — PREDNISONE 5 MG/1
5 TABLET ORAL DAILY
Status: DISCONTINUED | OUTPATIENT
Start: 2023-06-14 | End: 2023-06-16 | Stop reason: HOSPADM

## 2023-06-13 RX ORDER — FUROSEMIDE 10 MG/ML
40 INJECTION INTRAMUSCULAR; INTRAVENOUS DAILY
Status: DISCONTINUED | OUTPATIENT
Start: 2023-06-14 | End: 2023-06-14

## 2023-06-13 RX ORDER — NITROFURANTOIN 25; 75 MG/1; MG/1
100 CAPSULE ORAL EVERY 12 HOURS
Status: DISCONTINUED | OUTPATIENT
Start: 2023-06-14 | End: 2023-06-14

## 2023-06-13 RX ORDER — SODIUM CHLORIDE 9 MG/ML
1000 INJECTION, SOLUTION INTRAVENOUS
Status: COMPLETED | OUTPATIENT
Start: 2023-06-13 | End: 2023-06-13

## 2023-06-13 RX ORDER — ONDANSETRON 2 MG/ML
4 INJECTION INTRAMUSCULAR; INTRAVENOUS
Status: COMPLETED | OUTPATIENT
Start: 2023-06-13 | End: 2023-06-13

## 2023-06-13 RX ORDER — IPRATROPIUM BROMIDE AND ALBUTEROL SULFATE 2.5; .5 MG/3ML; MG/3ML
3 SOLUTION RESPIRATORY (INHALATION) EVERY 6 HOURS PRN
Status: DISCONTINUED | OUTPATIENT
Start: 2023-06-13 | End: 2023-06-16 | Stop reason: HOSPADM

## 2023-06-13 RX ORDER — OXYCODONE HYDROCHLORIDE 5 MG/1
10 TABLET ORAL EVERY 6 HOURS PRN
Status: DISCONTINUED | OUTPATIENT
Start: 2023-06-13 | End: 2023-06-16 | Stop reason: HOSPADM

## 2023-06-13 RX ORDER — FAMOTIDINE 10 MG/ML
20 INJECTION INTRAVENOUS DAILY
Status: DISCONTINUED | OUTPATIENT
Start: 2023-06-14 | End: 2023-06-16 | Stop reason: HOSPADM

## 2023-06-13 RX ORDER — ALBUTEROL SULFATE 90 UG/1
2 AEROSOL, METERED RESPIRATORY (INHALATION) EVERY 8 HOURS
Status: DISCONTINUED | OUTPATIENT
Start: 2023-06-13 | End: 2023-06-14

## 2023-06-13 RX ORDER — GLUCAGON 1 MG
1 KIT INJECTION
Status: DISCONTINUED | OUTPATIENT
Start: 2023-06-13 | End: 2023-06-16 | Stop reason: HOSPADM

## 2023-06-13 RX ORDER — FUROSEMIDE 10 MG/ML
40 INJECTION INTRAMUSCULAR; INTRAVENOUS
Status: COMPLETED | OUTPATIENT
Start: 2023-06-13 | End: 2023-06-13

## 2023-06-13 RX ORDER — INSULIN ASPART 100 [IU]/ML
0-5 INJECTION, SOLUTION INTRAVENOUS; SUBCUTANEOUS EVERY 6 HOURS PRN
Status: DISCONTINUED | OUTPATIENT
Start: 2023-06-13 | End: 2023-06-16 | Stop reason: HOSPADM

## 2023-06-13 RX ORDER — DIGOXIN 125 MCG
125 TABLET ORAL DAILY
Status: DISCONTINUED | OUTPATIENT
Start: 2023-06-14 | End: 2023-06-16 | Stop reason: HOSPADM

## 2023-06-13 RX ORDER — ONDANSETRON 2 MG/ML
4 INJECTION INTRAMUSCULAR; INTRAVENOUS EVERY 8 HOURS PRN
Status: DISCONTINUED | OUTPATIENT
Start: 2023-06-13 | End: 2023-06-16 | Stop reason: HOSPADM

## 2023-06-13 RX ORDER — AMIODARONE HYDROCHLORIDE 200 MG/1
200 TABLET ORAL 2 TIMES DAILY
Status: DISCONTINUED | OUTPATIENT
Start: 2023-06-13 | End: 2023-06-16 | Stop reason: HOSPADM

## 2023-06-13 RX ORDER — TALC
6 POWDER (GRAM) TOPICAL NIGHTLY PRN
Status: DISCONTINUED | OUTPATIENT
Start: 2023-06-13 | End: 2023-06-16 | Stop reason: HOSPADM

## 2023-06-13 RX ORDER — ATORVASTATIN CALCIUM 40 MG/1
40 TABLET, FILM COATED ORAL DAILY
Status: DISCONTINUED | OUTPATIENT
Start: 2023-06-14 | End: 2023-06-16 | Stop reason: HOSPADM

## 2023-06-13 RX ORDER — WARFARIN 1 MG/1
4 TABLET ORAL DAILY
Status: DISCONTINUED | OUTPATIENT
Start: 2023-06-14 | End: 2023-06-15

## 2023-06-13 RX ORDER — LEVOTHYROXINE SODIUM 50 UG/1
50 TABLET ORAL
Status: DISCONTINUED | OUTPATIENT
Start: 2023-06-14 | End: 2023-06-16 | Stop reason: HOSPADM

## 2023-06-13 RX ORDER — IBUPROFEN 600 MG/1
600 TABLET ORAL EVERY 6 HOURS PRN
Status: DISCONTINUED | OUTPATIENT
Start: 2023-06-13 | End: 2023-06-14

## 2023-06-13 RX ORDER — PROMETHAZINE HYDROCHLORIDE 25 MG/1
25 TABLET ORAL EVERY 6 HOURS PRN
Status: DISCONTINUED | OUTPATIENT
Start: 2023-06-13 | End: 2023-06-16 | Stop reason: HOSPADM

## 2023-06-13 RX ORDER — SODIUM CHLORIDE 0.9 % (FLUSH) 0.9 %
10 SYRINGE (ML) INJECTION
Status: DISCONTINUED | OUTPATIENT
Start: 2023-06-13 | End: 2023-06-16 | Stop reason: HOSPADM

## 2023-06-13 RX ORDER — IBUPROFEN 200 MG
400 TABLET ORAL EVERY 6 HOURS PRN
Status: DISCONTINUED | OUTPATIENT
Start: 2023-06-13 | End: 2023-06-14

## 2023-06-13 RX ADMIN — AMIODARONE HYDROCHLORIDE 200 MG: 200 TABLET ORAL at 09:06

## 2023-06-13 RX ADMIN — DEXTROSE MONOHYDRATE 1 G: 5 INJECTION INTRAVENOUS at 05:06

## 2023-06-13 RX ADMIN — MELATONIN 6 MG: at 09:06

## 2023-06-13 RX ADMIN — SODIUM CHLORIDE 1000 ML: 9 INJECTION, SOLUTION INTRAVENOUS at 02:06

## 2023-06-13 RX ADMIN — ONDANSETRON 4 MG: 2 INJECTION INTRAMUSCULAR; INTRAVENOUS at 02:06

## 2023-06-13 RX ADMIN — OXYCODONE HYDROCHLORIDE 10 MG: 5 TABLET ORAL at 09:06

## 2023-06-13 RX ADMIN — FUROSEMIDE 40 MG: 10 INJECTION, SOLUTION INTRAMUSCULAR; INTRAVENOUS at 03:06

## 2023-06-13 RX ADMIN — ALBUTEROL SULFATE 2 PUFF: 90 AEROSOL, METERED RESPIRATORY (INHALATION) at 07:06

## 2023-06-13 RX ADMIN — SODIUM CHLORIDE 1000 ML: 9 INJECTION, SOLUTION INTRAVENOUS at 09:06

## 2023-06-13 RX ADMIN — PROMETHAZINE HYDROCHLORIDE 25 MG: 25 INJECTION INTRAMUSCULAR; INTRAVENOUS at 05:06

## 2023-06-13 NOTE — ED PROVIDER NOTES
"Encounter Date: 6/13/2023       History     Chief Complaint   Patient presents with    Vomiting     C/o vomiting since Saturday. Denies diarrhea or nausea. Unable to tolerate PO.      84 yo WF presents to ED with c/o nausea and vomiting since Saturday, unable to hold down food, fluids or medications. Occasional stomach cramping but states "it just feels empty". Denies sick contacts. No diarrhea, last normal BM Sunday.     The history is provided by a relative and the patient.   Review of patient's allergies indicates:   Allergen Reactions    Tylenol [acetaminophen] Anaphylaxis and Shortness Of Breath     Past Medical History:   Diagnosis Date    Carotid artery stenosis     Chronic respiratory failure with hypoxia     COPD (chronic obstructive pulmonary disease)     Diabetes mellitus     DVT (deep venous thrombosis)     Heart failure     High cholesterol     Hypertension     Hypothyroidism     Paroxysmal atrial fibrillation      Past Surgical History:   Procedure Laterality Date    APPENDECTOMY      BACK SURGERY  1980 1984    BILATERAL TUBAL LIGATION  1994    CARDIAC SURGERY  1990    aorta bypass    caritod Left     HERNIA REPAIR      intestine repair       History reviewed. No pertinent family history.  Social History     Tobacco Use    Smoking status: Never    Smokeless tobacco: Never     Review of Systems   Constitutional:  Positive for activity change, appetite change and fatigue. Negative for chills and fever.   Respiratory:  Positive for shortness of breath (COPD). Negative for cough and wheezing.    Cardiovascular:  Negative for chest pain, palpitations and leg swelling.   Gastrointestinal:  Positive for abdominal pain, nausea and vomiting. Negative for abdominal distention and diarrhea.   Genitourinary:  Negative for dysuria.   Neurological:  Positive for weakness. Negative for dizziness, numbness and headaches.   Psychiatric/Behavioral:  Negative for confusion.    All other systems reviewed and are " negative.    Physical Exam     Initial Vitals [06/13/23 1438]   BP Pulse Resp Temp SpO2   (!) 104/50 85 18 98 °F (36.7 °C) 97 %      MAP       --         Physical Exam    Nursing note and vitals reviewed.  Constitutional: She appears well-developed and well-nourished. She is cooperative. She appears ill.   HENT:   Head: Normocephalic and atraumatic.   Right Ear: External ear normal.   Left Ear: External ear normal.   Nose: Nose normal.   Mouth/Throat: Mucous membranes are dry.   Eyes: EOM are normal. Pupils are equal, round, and reactive to light.   Neck: Neck supple.   Normal range of motion.  Cardiovascular:  Normal rate, regular rhythm and normal heart sounds.           Pulmonary/Chest: Effort normal and breath sounds normal.   Abdominal: Abdomen is soft. She exhibits no distension. There is no abdominal tenderness.   Musculoskeletal:         General: Normal range of motion.      Cervical back: Normal range of motion and neck supple.     Neurological: She is alert and oriented to person, place, and time. She has normal strength. GCS score is 15. GCS eye subscore is 4. GCS verbal subscore is 5. GCS motor subscore is 6.   Skin: Capillary refill takes 2 to 3 seconds.   Psychiatric: She has a normal mood and affect. Her behavior is normal. Judgment and thought content normal.       Medical Screening Exam   See Full Note    ED Course   Procedures  Labs Reviewed   COMPREHENSIVE METABOLIC PANEL - Abnormal; Notable for the following components:       Result Value    Sodium 133 (*)     Chloride 96 (*)     Glucose 188 (*)     BUN 19 (*)     Creatinine 1.05 (*)     Albumin 3.1 (*)     Globulin 4.3 (*)     eGFR 53 (*)     All other components within normal limits   URINALYSIS, REFLEX TO URINE CULTURE - Abnormal; Notable for the following components:    Leukocytes, UA Trace (*)     Protein, UA 30 (*)     Glucose,  (*)     All other components within normal limits   LACTIC ACID, PLASMA - Abnormal; Notable for the  following components:    Lactic Acid 2.5 (*)     All other components within normal limits   CBC WITH DIFFERENTIAL - Abnormal; Notable for the following components:    WBC 12.18 (*)     Hemoglobin 11.2 (*)     Hematocrit 37.2 (*)     MCH 25.5 (*)     MCHC 30.1 (*)     RDW 16.5 (*)     Neutrophils % 82.5 (*)     Lymphocytes % 9.8 (*)     Neutrophils, Abs 10.05 (*)     Monocytes % 7.1 (*)     Eosinophils % 0.4 (*)     Monocytes, Absolute 0.87 (*)     All other components within normal limits   NT-PRO NATRIURETIC PEPTIDE - Abnormal; Notable for the following components:    ProBNP 12,848 (*)     All other components within normal limits   URINALYSIS, MICROSCOPIC - Abnormal; Notable for the following components:    WBC, UA 15-25 (*)     Bacteria, UA Few (*)     Squamous Epithelial Cells, UA Moderate (*)     All other components within normal limits   TROPONIN I - Normal   LIPASE - Normal   LACTIC ACID, PLASMA - Normal   CULTURE, URINE   CBC W/ AUTO DIFFERENTIAL    Narrative:     The following orders were created for panel order CBC auto differential.  Procedure                               Abnormality         Status                     ---------                               -----------         ------                     CBC with Differential[[id number]]        Abnormal            Final result                 Please view results for these tests on the individual orders.   POCT GLUCOSE MONITORING CONTINUOUS     EKG Readings: (Independently Interpreted)   Initial Reading: No STEMI. Conduction: RBBB. Axis: Left Axis Deviation. Clinical Impression: with PACs   No previous EKG for comparison   ECG Results              EKG 12-lead (In process)  Result time 06/13/23 15:23:08      In process by Interface, Lab In [facility] (06/13/23 15:23:08)                   Narrative:    Test Reason : R11.10,    Vent. Rate : 060 BPM     Atrial Rate : 060 BPM     P-R Int : 152 ms          QRS Dur : 098 ms      QT Int : 402 ms       P-R-T Axes :  000 -44 080 degrees     QTc Int : 402 ms    Sinus rhythm with Premature atrial complexes  Left axis deviation  Incomplete right bundle branch block  Moderate voltage criteria for LVH, may be normal variant  Anteroseptal infarct ,age undetermined  Abnormal ECG  No previous ECGs available    Referred By: [email or web address]   SELF           Confirmed By:                                   Imaging Results              X-Ray Chest AP Portable (Final result)  Result time 06/13/23 19:15:19      Final result by Musa Bennett II, MD (06/13/23 19:15:19)                   Impression:      Chronic lung changes.  No acute process or significant change.      Electronically signed by: Musa Bennett  Date:    06/13/2023  Time:    19:15               Narrative:    EXAMINATION:  XR CHEST AP PORTABLE    CLINICAL HISTORY:  Chronic obstructive pulmonary disease, unspecified    COMPARISON:  4 January 2023    TECHNIQUE:  XR CHEST AP PORTABLE    FINDINGS:  The heart and mediastinum are normal in size and configuration.  The pulmonary vascularity is normal in caliber. Lung volumes are increased with prominent bronchial markings.  No lung infiltrates, effusions, pneumothorax or other abnormality is demonstrated.                                       Medications   amiodarone tablet 200 mg (has no administration in time range)   atorvastatin tablet 40 mg (has no administration in time range)   digoxin tablet 125 mcg (has no administration in time range)   levothyroxine tablet 50 mcg (has no administration in time range)   predniSONE tablet 5 mg (has no administration in time range)   warfarin (COUMADIN) tablet 4 mg (has no administration in time range)   glucagon (human recombinant) injection 1 mg (has no administration in time range)   insulin aspart U-100 injection 0-5 Units (has no administration in time range)   furosemide injection 40 mg (has no administration in time range)   0.9%  NaCl infusion (has no administration in time range)  "  dextrose 50% injection 12.5 g (has no administration in time range)   dextrose 50% injection 25 g (has no administration in time range)   sodium chloride 0.9% flush 10 mL (has no administration in time range)   melatonin tablet 6 mg (has no administration in time range)   nitrofurantoin (macrocrystal-monohydrate) 100 MG capsule 100 mg (has no administration in time range)   ibuprofen tablet 600 mg (has no administration in time range)   oxyCODONE immediate release tablet 10 mg (has no administration in time range)   famotidine (PF) injection 20 mg (has no administration in time range)   ibuprofen tablet 400 mg (has no administration in time range)   ondansetron injection 4 mg (has no administration in time range)   promethazine tablet 25 mg (has no administration in time range)   albuterol-ipratropium 2.5 mg-0.5 mg/3 mL nebulizer solution 3 mL (has no administration in time range)   albuterol inhaler 2 puff (has no administration in time range)   sodium chloride 0.9% bolus 1,000 mL 1,000 mL (0 mLs Intravenous Stopped 6/13/23 1651)   ondansetron injection 4 mg (4 mg Intravenous Given 6/13/23 1450)   furosemide injection 40 mg (40 mg Intravenous Given 6/13/23 1552)   promethazine (PHENERGAN) 25 mg in dextrose 5 % (D5W) 50 mL IVPB (0 mg Intravenous Stopped 6/13/23 1757)   cefTRIAXone (ROCEPHIN) 1 g in dextrose 5 % in water (D5W) 5 % 100 mL IVPB (MB+) (0 g Intravenous Stopped 6/13/23 1807)     Medical Decision Making:   Initial Assessment:   84 yo WF presents to ED with c/o nausea and vomiting since Saturday, unable to hold down food, fluids or medications. Occasional stomach cramping but states "it just feels empty". Denies sick contacts. No diarrhea, last normal BM Sunday.       Differential Diagnosis:   Gastritis  Cholecystitis  Viral syndrome  Clinical Tests:   Lab Tests: Ordered and Reviewed       <> Summary of Lab: WBC: 12.18  Lactate, Franck: 2.5   Sodium: 133   NT-proBNP: 12,848   Glucose: 188   BUN: 19 "   Creatinine: 1.05   NT-proBNP: 12,848  WBC, UA: 15-25   Bacteria, UA: Few   Leukocytes, UA: Trace         Radiological Study: Ordered and Reviewed  Medical Tests: Ordered and Reviewed  ED Management:  Labs reviewed  EKG with PAC's, left axis deviation, incomplete RBBB  Patient reported only slight improvement in nausea after Zofran and Phenergan  Lactate improved after 1L NS  Lasix given for elevated BNP  Discussed patient with Dr. Segura who agrees with obs admission for control of n/v, IV fluids/lasix/lab monitoring.       Other:   I have discussed this case with another health care provider.       <> Summary of the Discussion: Discussed patient with Dr. Segura, agrees with patient admission for fluids/lasix/lab monitoring.            ED Course as of 06/13/23 1925 Tue Jun 13, 2023   1454 WBC(!): 12.18 [MJ]   1512 Lactate, Franck(!): 2.5 [MJ]   1519 Sodium(!): 133 [MJ]   1520 NT-proBNP(!): 12,848 [MJ]   1520 Glucose(!): 188 [MJ]   1520 BUN(!): 19 [MJ]   1520 Creatinine(!): 1.05 [MJ]   1523 NT-proBNP(!): 12,848 [MJ]   1646 WBC, UA(!): 15-25 [MJ]   1649 Bacteria, UA(!): Few [MJ]   1649 Leukocytes, UA(!): Trace [MJ]   1701 Patient states she does not feel any better. Nausea continues. Lactate improved after fluids. Discussed with Dr. Segura, agrees with admission for fluids/lasix/lab monitoring [MJ]      ED Course User Index  [MJ] PREETHI Diego                Clinical Impression:   Final diagnoses:  [R11.10] Vomiting  [R11.2] Nausea and vomiting, unspecified vomiting type (Primary)  [N30.00] Acute cystitis without hematuria  [E87.1] Hyponatremia  [R79.89] Elevated brain natriuretic peptide (BNP) level  [J44.9] COPD (chronic obstructive pulmonary disease)        ED Disposition Condition    Observation Stable                    PREETHI Diego  06/13/23 1925

## 2023-06-13 NOTE — ED NOTES
Verified compatibility of promethazine and ceftriaxone. No interactions reported. MONALISA Boucher 2nd verification.

## 2023-06-14 LAB
ALBUMIN SERPL BCP-MCNC: 2.8 G/DL (ref 3.5–5)
ALBUMIN/GLOB SERPL: 0.7 {RATIO}
ALP SERPL-CCNC: 80 U/L (ref 55–142)
ALT SERPL W P-5'-P-CCNC: 25 U/L (ref 13–56)
ANION GAP SERPL CALCULATED.3IONS-SCNC: 6 MMOL/L (ref 7–16)
AST SERPL W P-5'-P-CCNC: 22 U/L (ref 15–37)
BASOPHILS # BLD AUTO: 0.03 K/UL (ref 0–0.2)
BASOPHILS NFR BLD AUTO: 0.3 % (ref 0–1)
BILIRUB SERPL-MCNC: 0.6 MG/DL (ref ?–1.2)
BUN SERPL-MCNC: 19 MG/DL (ref 7–18)
BUN/CREAT SERPL: 19 (ref 6–20)
CALCIUM SERPL-MCNC: 8.8 MG/DL (ref 8.5–10.1)
CHLORIDE SERPL-SCNC: 98 MMOL/L (ref 98–107)
CO2 SERPL-SCNC: 35 MMOL/L (ref 21–32)
CREAT SERPL-MCNC: 0.98 MG/DL (ref 0.55–1.02)
DIFFERENTIAL METHOD BLD: ABNORMAL
EGFR (NO RACE VARIABLE) (RUSH/TITUS): 58 ML/MIN/1.73M2
EOSINOPHIL # BLD AUTO: 0.08 K/UL (ref 0–0.5)
EOSINOPHIL NFR BLD AUTO: 0.9 % (ref 1–4)
ERYTHROCYTE [DISTWIDTH] IN BLOOD BY AUTOMATED COUNT: 16.4 % (ref 11.5–14.5)
EST. AVERAGE GLUCOSE BLD GHB EST-MCNC: 160 MG/DL
GLOBULIN SER-MCNC: 3.8 G/DL (ref 2–4)
GLUCOSE SERPL-MCNC: 145 MG/DL (ref 74–106)
GLUCOSE SERPL-MCNC: 146 MG/DL (ref 70–105)
GLUCOSE SERPL-MCNC: 158 MG/DL (ref 70–105)
GLUCOSE SERPL-MCNC: 209 MG/DL (ref 70–105)
GLUCOSE SERPL-MCNC: 209 MG/DL (ref 70–105)
HBA1C MFR BLD HPLC: 7.4 % (ref 4.5–6.6)
HCT VFR BLD AUTO: 35.1 % (ref 38–47)
HGB BLD-MCNC: 10.4 G/DL (ref 12–16)
INR BLD: 2.81
LYMPHOCYTES # BLD AUTO: 1.19 K/UL (ref 1–4.8)
LYMPHOCYTES NFR BLD AUTO: 13 % (ref 27–41)
MCH RBC QN AUTO: 25.1 PG (ref 27–31)
MCHC RBC AUTO-ENTMCNC: 29.6 G/DL (ref 32–36)
MCV RBC AUTO: 84.8 FL (ref 80–96)
MONOCYTES # BLD AUTO: 0.76 K/UL (ref 0–0.8)
MONOCYTES NFR BLD AUTO: 8.3 % (ref 2–6)
MPC BLD CALC-MCNC: 9.8 FL (ref 9.4–12.4)
NEUTROPHILS # BLD AUTO: 7.07 K/UL (ref 1.8–7.7)
NEUTROPHILS NFR BLD AUTO: 77.5 % (ref 53–65)
PLATELET # BLD AUTO: 337 K/UL (ref 150–400)
POTASSIUM SERPL-SCNC: 3.3 MMOL/L (ref 3.5–5.1)
PROT SERPL-MCNC: 6.6 G/DL (ref 6.4–8.2)
PROTHROMBIN TIME: 29.4 SECONDS (ref 11.7–14.7)
RBC # BLD AUTO: 4.14 M/UL (ref 4.2–5.4)
SODIUM SERPL-SCNC: 136 MMOL/L (ref 136–145)
WBC # BLD AUTO: 9.13 K/UL (ref 4.5–11)

## 2023-06-14 PROCEDURE — G0378 HOSPITAL OBSERVATION PER HR: HCPCS

## 2023-06-14 PROCEDURE — 63600175 PHARM REV CODE 636 W HCPCS: Performed by: HOSPITALIST

## 2023-06-14 PROCEDURE — 85610 PROTHROMBIN TIME: CPT | Performed by: HOSPITALIST

## 2023-06-14 PROCEDURE — 11000001 HC ACUTE MED/SURG PRIVATE ROOM

## 2023-06-14 PROCEDURE — 82962 GLUCOSE BLOOD TEST: CPT | Mod: 91

## 2023-06-14 PROCEDURE — 99222 1ST HOSP IP/OBS MODERATE 55: CPT | Mod: AI,,, | Performed by: HOSPITALIST

## 2023-06-14 PROCEDURE — 25000003 PHARM REV CODE 250: Performed by: HOSPITALIST

## 2023-06-14 PROCEDURE — 25000003 PHARM REV CODE 250: Performed by: NURSE PRACTITIONER

## 2023-06-14 PROCEDURE — 83036 HEMOGLOBIN GLYCOSYLATED A1C: CPT | Performed by: NURSE PRACTITIONER

## 2023-06-14 PROCEDURE — 99222 PR INITIAL HOSPITAL CARE,LEVL II: ICD-10-PCS | Mod: AI,,, | Performed by: HOSPITALIST

## 2023-06-14 PROCEDURE — 96375 TX/PRO/DX INJ NEW DRUG ADDON: CPT

## 2023-06-14 PROCEDURE — 96376 TX/PRO/DX INJ SAME DRUG ADON: CPT

## 2023-06-14 PROCEDURE — 99900035 HC TECH TIME PER 15 MIN (STAT)

## 2023-06-14 PROCEDURE — 27000221 HC OXYGEN, UP TO 24 HOURS

## 2023-06-14 PROCEDURE — 85025 COMPLETE CBC W/AUTO DIFF WBC: CPT | Performed by: NURSE PRACTITIONER

## 2023-06-14 PROCEDURE — 96372 THER/PROPH/DIAG INJ SC/IM: CPT | Performed by: NURSE PRACTITIONER

## 2023-06-14 PROCEDURE — 94761 N-INVAS EAR/PLS OXIMETRY MLT: CPT

## 2023-06-14 PROCEDURE — 80053 COMPREHEN METABOLIC PANEL: CPT | Performed by: NURSE PRACTITIONER

## 2023-06-14 PROCEDURE — 63600175 PHARM REV CODE 636 W HCPCS: Performed by: NURSE PRACTITIONER

## 2023-06-14 PROCEDURE — 94640 AIRWAY INHALATION TREATMENT: CPT

## 2023-06-14 RX ORDER — ALBUTEROL SULFATE 90 UG/1
2 AEROSOL, METERED RESPIRATORY (INHALATION) EVERY 8 HOURS
Status: DISCONTINUED | OUTPATIENT
Start: 2023-06-14 | End: 2023-06-16 | Stop reason: HOSPADM

## 2023-06-14 RX ORDER — ALBUTEROL SULFATE 90 UG/1
2 AEROSOL, METERED RESPIRATORY (INHALATION) EVERY 8 HOURS
Status: CANCELLED | OUTPATIENT
Start: 2023-06-14

## 2023-06-14 RX ADMIN — DIGOXIN 125 MCG: 125 TABLET ORAL at 08:06

## 2023-06-14 RX ADMIN — FAMOTIDINE 20 MG: 10 INJECTION, SOLUTION INTRAVENOUS at 08:06

## 2023-06-14 RX ADMIN — MELATONIN 6 MG: at 08:06

## 2023-06-14 RX ADMIN — AMIODARONE HYDROCHLORIDE 200 MG: 200 TABLET ORAL at 08:06

## 2023-06-14 RX ADMIN — CEFTRIAXONE SODIUM 1 G: 1 INJECTION, POWDER, FOR SOLUTION INTRAMUSCULAR; INTRAVENOUS at 01:06

## 2023-06-14 RX ADMIN — ALBUTEROL SULFATE 2 PUFF: 90 AEROSOL, METERED RESPIRATORY (INHALATION) at 07:06

## 2023-06-14 RX ADMIN — LEVOTHYROXINE SODIUM 50 MCG: 0.05 TABLET ORAL at 05:06

## 2023-06-14 RX ADMIN — FUROSEMIDE 40 MG: 10 INJECTION, SOLUTION INTRAMUSCULAR; INTRAVENOUS at 08:06

## 2023-06-14 RX ADMIN — ONDANSETRON 4 MG: 2 INJECTION INTRAMUSCULAR; INTRAVENOUS at 08:06

## 2023-06-14 RX ADMIN — ALBUTEROL SULFATE 2 PUFF: 90 AEROSOL, METERED RESPIRATORY (INHALATION) at 04:06

## 2023-06-14 RX ADMIN — OXYCODONE HYDROCHLORIDE 10 MG: 5 TABLET ORAL at 08:06

## 2023-06-14 RX ADMIN — ALBUTEROL SULFATE 2 PUFF: 90 AEROSOL, METERED RESPIRATORY (INHALATION) at 11:06

## 2023-06-14 RX ADMIN — WARFARIN SODIUM 4 MG: 1 TABLET ORAL at 05:06

## 2023-06-14 RX ADMIN — NITROFURANTOIN 100 MG: 25; 75 CAPSULE ORAL at 08:06

## 2023-06-14 RX ADMIN — PREDNISONE 5 MG: 5 TABLET ORAL at 08:06

## 2023-06-14 RX ADMIN — ALBUTEROL SULFATE 2 PUFF: 90 AEROSOL, METERED RESPIRATORY (INHALATION) at 12:06

## 2023-06-14 RX ADMIN — ATORVASTATIN CALCIUM 40 MG: 40 TABLET, FILM COATED ORAL at 08:06

## 2023-06-14 RX ADMIN — OXYCODONE HYDROCHLORIDE 10 MG: 5 TABLET ORAL at 05:06

## 2023-06-14 RX ADMIN — INSULIN ASPART 2 UNITS: 100 INJECTION, SOLUTION INTRAVENOUS; SUBCUTANEOUS at 11:06

## 2023-06-14 RX ADMIN — INSULIN ASPART 2 UNITS: 100 INJECTION, SOLUTION INTRAVENOUS; SUBCUTANEOUS at 05:06

## 2023-06-14 NOTE — PROGRESS NOTES
[facility] Medicine  Progress Note    Patient Name: Karina Rojas  MRN: [id number]  Patient Class: OP- Observation   Admission Date: 6/13/2023  Length of Stay: 0 days  Attending Physician: Jorge Buckley DO  Primary Care Provider: Thalia Bowie NP        Subjective:     Principal Problem:Intractable nausea and vomiting        HPI:  84 yo WF admitted from ED for intractable nausea/vomiting, UTI and elevated BNP. Vomiting since Sunday, unable to tolerate PO intake. Not improved with fluids, Zofran, Phenergan in ED.    History:  COPD  Diabetes  DVT  Carotid artery stenosis  CHF  HTN  HLD  Hypothyroid  Afib    FULL CODE        Overview/Hospital Course:  No notes on file      Review of Systems   Constitutional:  Positive for activity change, appetite change and fatigue. Negative for chills and fever.   Respiratory:  Positive for cough and shortness of breath (COPD).    Cardiovascular:  Negative for chest pain, palpitations and leg swelling.   Gastrointestinal:  Positive for abdominal pain (epigastric - only when vomiting), nausea and vomiting. Negative for constipation and diarrhea.   Musculoskeletal:  Positive for arthralgias and myalgias.   Neurological:  Positive for weakness. Negative for dizziness.   Psychiatric/Behavioral:  Negative for confusion.    All other systems reviewed and are negative.  Objective:     Vital Signs (Most Recent):  Temp: 98 °F (36.7 °C) (06/13/23 1438)  Pulse: 68 (06/13/23 2030)  Resp: 18 (06/13/23 2030)  BP: (!) 170/66 (06/13/23 2030)  SpO2: 97 % (06/13/23 2030) Vital Signs (24h Range):  Temp:  [98 °F (36.7 °C)] 98 °F (36.7 °C)  Pulse:  [68-85] 68  Resp:  [18-20] 18  SpO2:  [97 %-98 %] 97 %  BP: (104-170)/(50-67) 170/66     Weight: 72.6 kg (160 lb)  Body mass index is 25.06 kg/m².    Intake/Output Summary (Last 24 hours) at 6/13/2023 2037  Last data filed at 6/13/2023 1625  Gross per 24 hour   Intake --   Output 600 ml   Net -600 ml          Physical Exam  Vitals and nursing note reviewed.   Constitutional:       Appearance: She is obese. She is ill-appearing.   HENT:      Head: Normocephalic and atraumatic.      Right Ear: Tympanic membrane, ear canal and external ear normal.      Left Ear: Tympanic membrane, ear canal and external ear normal.      Nose: Nose normal.      Mouth/Throat:      Mouth: Mucous membranes are dry.   Eyes:      Extraocular Movements: Extraocular movements intact.      Pupils: Pupils are equal, round, and reactive to light.   Cardiovascular:      Rate and Rhythm: Normal rate and regular rhythm.      Pulses: Normal pulses.      Heart sounds: Normal heart sounds.   Pulmonary:      Effort: Pulmonary effort is normal.   Abdominal:      General: There is no distension.      Palpations: Abdomen is soft.      Tenderness: There is no abdominal tenderness.   Musculoskeletal:         General: Normal range of motion.      Cervical back: Normal range of motion and neck supple.   Skin:     General: Skin is warm.      Capillary Refill: Capillary refill takes 2 to 3 seconds.   Neurological:      General: No focal deficit present.      Mental Status: She is alert and oriented to person, place, and time.   Psychiatric:         Mood and Affect: Mood normal.         Behavior: Behavior normal.         Thought Content: Thought content normal.         Judgment: Judgment normal.           Significant Labs: All pertinent labs within the past 24 hours have been reviewed.  Recent Lab Results         06/13/23  1647   06/13/23  1630   06/13/23  1449        Albumin/Globulin Ratio     0.7       Albumin     3.1       Alkaline Phosphatase     89       ALT     24       Anion Gap     9       Appearance, UA   Clear         AST     19       Bacteria, UA   Few         Baso #     0.02       Basophil %     0.2       Bilirubin (UA)   Negative         BILIRUBIN TOTAL     0.7       BUN     19       BUN/CREAT RATIO     18       Calcium     9.6       Chloride     96        CO2     32       Color, UA   Yellow         Creatinine     1.05       Differential Type     Auto       eGFR     53       Eos #     0.05       Eosinophil %     0.4       Globulin, Total     4.3       Glucose     188       Glucose, UA   100         Hematocrit     37.2       Hemoglobin     11.2       Ketones, UA   Negative         Lactate, Franck 1.7     2.5  Comment: A repeat order for Lactic Acid has been placed for collection in 2 hours.       Leukocytes, UA   Trace         Lipase 83           Lymph #     1.19       Lymph %     9.8       MCH     25.5       MCHC     30.1       MCV     84.5       Mono #     0.87       Mono %     7.1       MPV     9.7       Neutrophils, Abs     10.05       Neutrophils Relative     82.5       NITRITE UA   Negative         NT-proBNP     12,848       Occult Blood UA   Negative         pH, UA   7.0         Platelets     383       Potassium     4.1       PROTEIN TOTAL     7.4       Protein, UA   30         RBC     4.40       RBC, UA   None Seen         RDW     16.5       Sodium     133       Specific [place], UA   1.015         Squam Epithel, UA   Moderate         Troponin I High Sensitivity     20.4       UROBILINOGEN UA   0.2         WBC, UA   15-25         WBC     12.18               Significant Imaging: I have reviewed all pertinent imaging results/findings within the past 24 hours.      Assessment/Plan:      * Intractable nausea and vomiting  IV fluids  Clear liquid diet, advance as tolerated  Zofran/Phenergan PRN    At risk for falls    Call light within reach  Patient educated on calling for help before getting out of bed  BSC in place  Non slip footwear  Bed low and locked    Elevated brain natriuretic peptide (BNP) level  IV Lasix, adjust dose after AM lab  Monitor I/O      Acute cystitis without hematuria  Macrobid PO if tolerated, can switch to IV Rocephin Q24 if unable to tolerate PO      Diabetes  Patient's FSGs are controlled on current medication regimen.  Last A1c reviewed-    Lab Results   Component Value Date    HGBA1C 6.7 (H) 02/06/2023     Most recent fingerstick glucose reviewed- No results for input(s): POCTGLUCOSE in the last 24 hours.  Current correctional scale  Low  Maintain anti-hyperglycemic dose as follows-   Antihyperglycemics (From admission, onward)    Start     Stop Route Frequency Ordered    06/13/23 2019  insulin aspart U-100 injection 0-5 Units         -- SubQ Every 6 hours PRN 06/13/23 1920        Hold Oral hypoglycemics while patient is in the hospital.    HTN (hypertension)  Continue home medications        VTE Risk Mitigation (From admission, onward)         Ordered     warfarin (COUMADIN) tablet 4 mg  Daily         06/13/23 1920     Reason for No Pharmacological VTE Prophylaxis  Once        Question:  Reasons:  Answer:  Already adequately anticoagulated on oral Anticoagulants    06/13/23 1924     Reason for no Mechanical VTE Prophylaxis  Once        Question:  Reasons:  Answer:  Patient is Ambulatory    06/13/23 1924     IP VTE HIGH RISK PATIENT  Once         06/13/23 1924                Discharge Planning   CHANTAL:      Code Status: Full Code   Is the patient medically ready for discharge?:     Reason for patient still in hospital (select all that apply): Patient trending condition, Laboratory test and Treatment           PREETHI Diego  Department of Hospital Medicine   [facility]

## 2023-06-14 NOTE — SUBJECTIVE & OBJECTIVE
Review of Systems   Constitutional:  Positive for activity change, appetite change and fatigue. Negative for chills and fever.   Respiratory:  Positive for cough and shortness of breath (COPD).    Cardiovascular:  Negative for chest pain, palpitations and leg swelling.   Gastrointestinal:  Positive for abdominal pain (epigastric - only when vomiting), nausea and vomiting. Negative for constipation and diarrhea.   Musculoskeletal:  Positive for arthralgias and myalgias.   Neurological:  Positive for weakness. Negative for dizziness.   Psychiatric/Behavioral:  Negative for confusion.    All other systems reviewed and are negative.  Objective:     Vital Signs (Most Recent):  Temp: 98 °F (36.7 °C) (06/13/23 1438)  Pulse: 68 (06/13/23 2030)  Resp: 18 (06/13/23 2030)  BP: (!) 170/66 (06/13/23 2030)  SpO2: 97 % (06/13/23 2030) Vital Signs (24h Range):  Temp:  [98 °F (36.7 °C)] 98 °F (36.7 °C)  Pulse:  [68-85] 68  Resp:  [18-20] 18  SpO2:  [97 %-98 %] 97 %  BP: (104-170)/(50-67) 170/66     Weight: 72.6 kg (160 lb)  Body mass index is 25.06 kg/m².    Intake/Output Summary (Last 24 hours) at 6/13/2023 2037  Last data filed at 6/13/2023 1625  Gross per 24 hour   Intake --   Output 600 ml   Net -600 ml         Physical Exam  Vitals and nursing note reviewed.   Constitutional:       Appearance: She is obese. She is ill-appearing.   HENT:      Head: Normocephalic and atraumatic.      Right Ear: Tympanic membrane, ear canal and external ear normal.      Left Ear: Tympanic membrane, ear canal and external ear normal.      Nose: Nose normal.      Mouth/Throat:      Mouth: Mucous membranes are dry.   Eyes:      Extraocular Movements: Extraocular movements intact.      Pupils: Pupils are equal, round, and reactive to light.   Cardiovascular:      Rate and Rhythm: Normal rate and regular rhythm.      Pulses: Normal pulses.      Heart sounds: Normal heart sounds.   Pulmonary:      Effort: Pulmonary effort is normal.   Abdominal:       General: There is no distension.      Palpations: Abdomen is soft.      Tenderness: There is no abdominal tenderness.   Musculoskeletal:         General: Normal range of motion.      Cervical back: Normal range of motion and neck supple.   Skin:     General: Skin is warm.      Capillary Refill: Capillary refill takes 2 to 3 seconds.   Neurological:      General: No focal deficit present.      Mental Status: She is alert and oriented to person, place, and time.   Psychiatric:         Mood and Affect: Mood normal.         Behavior: Behavior normal.         Thought Content: Thought content normal.         Judgment: Judgment normal.           Significant Labs: All pertinent labs within the past 24 hours have been reviewed.  Recent Lab Results         06/13/23  1647   06/13/23  1630   06/13/23  1449        Albumin/Globulin Ratio     0.7       Albumin     3.1       Alkaline Phosphatase     89       ALT     24       Anion Gap     9       Appearance, UA   Clear         AST     19       Bacteria, UA   Few         Baso #     0.02       Basophil %     0.2       Bilirubin (UA)   Negative         BILIRUBIN TOTAL     0.7       BUN     19       BUN/CREAT RATIO     18       Calcium     9.6       Chloride     96       CO2     32       Color, UA   Yellow         Creatinine     1.05       Differential Type     Auto       eGFR     53       Eos #     0.05       Eosinophil %     0.4       Globulin, Total     4.3       Glucose     188       Glucose, UA   100         Hematocrit     37.2       Hemoglobin     11.2       Ketones, UA   Negative         Lactate, Franck 1.7     2.5  Comment: A repeat order for Lactic Acid has been placed for collection in 2 hours.       Leukocytes, UA   Trace         Lipase 83           Lymph #     1.19       Lymph %     9.8       MCH     25.5       MCHC     30.1       MCV     84.5       Mono #     0.87       Mono %     7.1       MPV     9.7       Neutrophils, Abs     10.05       Neutrophils Relative     82.5        NITRITE UA   Negative         NT-proBNP     12,848       Occult Blood UA   Negative         pH, UA   7.0         Platelets     383       Potassium     4.1       PROTEIN TOTAL     7.4       Protein, UA   30         RBC     4.40       RBC, UA   None Seen         RDW     16.5       Sodium     133       Specific [place], UA   1.015         Squam Epithel, UA   Moderate         Troponin I High Sensitivity     20.4       UROBILINOGEN UA   0.2         WBC, UA   15-25         WBC     12.18               Significant Imaging: I have reviewed all pertinent imaging results/findings within the past 24 hours.

## 2023-06-14 NOTE — ASSESSMENT & PLAN NOTE
Patient's FSGs are controlled on current medication regimen.  Last A1c reviewed-   Lab Results   Component Value Date    HGBA1C 6.7 (H) 02/06/2023     Most recent fingerstick glucose reviewed- No results for input(s): POCTGLUCOSE in the last 24 hours.  Current correctional scale  Low  Maintain anti-hyperglycemic dose as follows-   Antihyperglycemics (From admission, onward)    Start     Stop Route Frequency Ordered    06/13/23 2019  insulin aspart U-100 injection 0-5 Units         -- SubQ Every 6 hours PRN 06/13/23 1920        Hold Oral hypoglycemics while patient is in the hospital.

## 2023-06-14 NOTE — ASSESSMENT & PLAN NOTE
IV fluids  Clear liquid diet, advance as tolerated  Zofran/Phenergan PRN    A little better today but still apprehensive about advancing diet.

## 2023-06-14 NOTE — PLAN OF CARE
Problem: Adult Inpatient Plan of Care  Goal: Plan of Care Review  Outcome: Ongoing, Progressing  Goal: Patient-Specific Goal (Individualized)  Outcome: Ongoing, Progressing  Goal: Absence of Hospital-Acquired Illness or Injury  Outcome: Ongoing, Progressing  Goal: Optimal Comfort and Wellbeing  Outcome: Ongoing, Progressing  Goal: Readiness for Transition of Care  Outcome: Ongoing, Progressing     Problem: Diabetes Comorbidity  Goal: Blood Glucose Level Within Targeted Range  Outcome: Ongoing, Progressing     Problem: Fatigue  Goal: Improved Activity Tolerance  Outcome: Ongoing, Progressing     Problem: Fall Injury Risk  Goal: Absence of Fall and Fall-Related Injury  Outcome: Ongoing, Progressing     Problem: Breathing Pattern Ineffective  Goal: Effective Breathing Pattern  Outcome: Ongoing, Progressing     Problem: Pain Acute  Goal: Acceptable Pain Control and Functional Ability  Outcome: Ongoing, Progressing     Problem: Infection  Goal: Absence of Infection Signs and Symptoms  Outcome: Ongoing, Progressing     Problem: Gas Exchange Impaired  Goal: Optimal Gas Exchange  Outcome: Ongoing, Progressing     Problem: Impaired Wound Healing  Goal: Optimal Wound Healing  Outcome: Ongoing, Progressing     Problem: Skin Injury Risk Increased  Goal: Skin Health and Integrity  Outcome: Ongoing, Progressing

## 2023-06-14 NOTE — H&P
[facility] Medicine  History & Physical    Patient Name: Karina Rojas  MRN: [id number]  Patient Class: OP- Observation  Admission Date: 6/13/2023  Attending Physician: Jorge Buckley DO   Primary Care Provider: Thalia Bowie NP         Patient information was obtained from patient, past medical records and ER records.     Subjective:     Principal Problem:Intractable nausea and vomiting    Chief Complaint:   Chief Complaint   Patient presents with    Vomiting     C/o vomiting since Saturday. Denies diarrhea or nausea. Unable to tolerate PO.         HPI: 82 yo WF admitted from ED for intractable nausea/vomiting, UTI and elevated BNP. Vomiting since Sunday, unable to tolerate PO intake. Not improved with fluids, Zofran, Phenergan in ED.    History:  COPD  Diabetes  DVT  Carotid artery stenosis  CHF  HTN  HLD  Hypothyroid  Afib    FULL CODE        Past Medical History:   Diagnosis Date    Carotid artery stenosis     Chronic respiratory failure with hypoxia     COPD (chronic obstructive pulmonary disease)     Diabetes mellitus     DVT (deep venous thrombosis)     Heart failure     High cholesterol     Hypertension     Hypothyroidism     Paroxysmal atrial fibrillation        Past Surgical History:   Procedure Laterality Date    APPENDECTOMY      BACK SURGERY  1980 1984    BILATERAL TUBAL LIGATION  1994    CARDIAC SURGERY  1990    aorta bypass    caritod Left     HERNIA REPAIR      intestine repair         Review of patient's allergies indicates:   Allergen Reactions    Tylenol [acetaminophen] Anaphylaxis and Shortness Of Breath       No current facility-administered medications on file prior to encounter.     Current Outpatient Medications on File Prior to Encounter   Medication Sig    amiodarone (PACERONE) 200 MG Tab Take 200 mg by mouth once daily. Take 1 tablet by mouth in the morning and 1 tablet before bedtime.    atorvastatin (LIPITOR) 40  MG tablet Take 40 mg by mouth.    digoxin (LANOXIN) 125 mcg tablet Take 125 mcg by mouth once daily.    fluticasone/umeclidin/vilanter (TRELEGY ELLIPTA INHL) Inhale into the lungs daily as needed.    furosemide (LASIX) 40 MG tablet Take 40 mg by mouth once daily.    levothyroxine (SYNTHROID) 50 MCG tablet Take 1 tablet (50 mcg total) by mouth before breakfast.    metFORMIN (GLUCOPHAGE) 1000 MG tablet Take 1,000 mg by mouth 2 (two) times daily with meals.    predniSONE (DELTASONE) 5 MG tablet Take 5 mg by mouth.    warfarin (COUMADIN) 4 MG tablet Take 4 mg by mouth Daily.     Family History    None       Tobacco Use    Smoking status: Never    Smokeless tobacco: Never   Substance and Sexual Activity    Alcohol use: Not on file    Drug use: Not on file    Sexual activity: Not on file     Review of Systems   Constitutional:  Negative for appetite change, chills and fever.   Respiratory:  Negative for cough, shortness of breath and wheezing.    Cardiovascular:  Negative for chest pain, palpitations and leg swelling.   Gastrointestinal:  Positive for nausea and vomiting. Negative for abdominal distention and diarrhea.   Genitourinary:  Negative for dysuria.   Skin:  Negative for rash.   Neurological:  Negative for dizziness, seizures and syncope.   Psychiatric/Behavioral:  Negative for agitation, behavioral problems and confusion.    All other systems reviewed and are negative.  Objective:     Vital Signs (Most Recent):  Temp: 98 °F (36.7 °C) (06/14/23 0816)  Pulse: 60 (06/14/23 0816)  Resp: 18 (06/14/23 0847)  BP: (!) 149/81 (06/14/23 0816)  SpO2: 98 % (06/14/23 0816) Vital Signs (24h Range):  Temp:  [97.7 °F (36.5 °C)-98.6 °F (37 °C)] 98 °F (36.7 °C)  Pulse:  [55-85] 60  Resp:  [18-20] 18  SpO2:  [96 %-99 %] 98 %  BP: (104-154)/(50-81) 149/81     Weight: 67.6 kg (149 lb 0.5 oz)  Body mass index is 23.34 kg/m².     Physical Exam  Vitals reviewed.   Constitutional:       General: She is not in acute  distress.     Appearance: Normal appearance.   HENT:      Head: Normocephalic and atraumatic.   Eyes:      General: No scleral icterus.     Extraocular Movements: Extraocular movements intact.      Conjunctiva/sclera: Conjunctivae normal.      Pupils: Pupils are equal, round, and reactive to light.   Cardiovascular:      Rate and Rhythm: Normal rate and regular rhythm.      Heart sounds: No murmur heard.    No friction rub. No gallop.   Pulmonary:      Effort: Pulmonary effort is normal. No respiratory distress.      Breath sounds: Normal breath sounds. No wheezing or rales.   Abdominal:      General: Abdomen is flat. Bowel sounds are normal. There is no distension.      Palpations: Abdomen is soft.      Tenderness: There is no abdominal tenderness. There is no guarding.   Musculoskeletal:         General: No swelling.   Skin:     General: Skin is warm and dry.      Coloration: Skin is not jaundiced.      Findings: No rash.   Neurological:      General: No focal deficit present.      Mental Status: She is alert and oriented to person, place, and time.      Sensory: No sensory deficit.      Motor: Weakness present.   Psychiatric:         Mood and Affect: Mood normal.         Thought Content: Thought content normal.         Judgment: Judgment normal.            CRANIAL NERVES     CN III, IV, VI   Pupils are equal, round, and reactive to light.     Significant Labs: All pertinent labs within the past 24 hours have been reviewed.  Recent Lab Results  (Last 5 results in the past 24 hours)        06/14/23  1053   06/14/23  1014   06/14/23  0548   06/14/23  0544   06/14/23  0511        Albumin/Globulin Ratio       0.7         Albumin       2.8         Alkaline Phosphatase       80         ALT       25         Anion Gap       6         AST       22         Baso #     0.03           Basophil %     0.3           BILIRUBIN TOTAL       0.6         BUN       19         BUN/CREAT RATIO       19         Calcium       8.8          Chloride       98         CO2       35         Creatinine       0.98         Differential Type     Auto           eGFR       58         Eos #     0.08           Eosinophil %     0.9           Estimated Avg Glucose     160           Globulin, Total       3.8         Glucose       145         Hematocrit     35.1           Hemoglobin     10.4           Hemoglobin A1C External     7.4  Comment:   Normal:               <5.7%  Pre-Diabetic:       5.7% to 6.4%  Diabetic:             >6.4%  Diabetic Goal:     <7%           INR   2.81             Lymph #     1.19           Lymph %     13.0           MCH     25.1           MCHC     29.6           MCV     84.8           Mono #     0.76           Mono %     8.3           MPV     9.8           Neutrophils, Abs     7.07           Neutrophils Relative     77.5           Platelets     337           POC Glucose 209         146       Potassium       3.3         PROTEIN TOTAL       6.6         Protime   29.4             RBC     4.14           RDW     16.4           Sodium       136         WBC     9.13                                  Significant Imaging: I have reviewed all pertinent imaging results/findings within the past 24 hours.    Assessment/Plan:     * Intractable nausea and vomiting  IV fluids  Clear liquid diet, advance as tolerated  Zofran/Phenergan PRN    A little better today but still apprehensive about advancing diet.     At risk for falls    Call light within reach  Patient educated on calling for help before getting out of bed  BSC in place  Non slip footwear  Bed low and locked    Elevated brain natriuretic peptide (BNP) level  IV Lasix, adjust dose after AM lab  Monitor I/O      Acute cystitis without hematuria  Macrobid PO if tolerated, can switch to IV Rocephin Q24 if unable to tolerate PO      Diabetes  Patient's FSGs are controlled on current medication regimen.  Last A1c reviewed-   Lab Results   Component Value Date    HGBA1C 6.7 (H) 02/06/2023     Most recent  fingerstick glucose reviewed- No results for input(s): POCTGLUCOSE in the last 24 hours.  Current correctional scale  Low  Maintain anti-hyperglycemic dose as follows-   Antihyperglycemics (From admission, onward)    Start     Stop Route Frequency Ordered    06/13/23 2019  insulin aspart U-100 injection 0-5 Units         -- SubQ Every 6 hours PRN 06/13/23 1920        Hold Oral hypoglycemics while patient is in the hospital.    HTN (hypertension)  Continue home medications        VTE Risk Mitigation (From admission, onward)         Ordered     warfarin (COUMADIN) tablet 4 mg  Daily         06/13/23 1920     Reason for No Pharmacological VTE Prophylaxis  Once        Question:  Reasons:  Answer:  Already adequately anticoagulated on oral Anticoagulants    06/13/23 1924     Reason for no Mechanical VTE Prophylaxis  Once        Question:  Reasons:  Answer:  Patient is Ambulatory    06/13/23 1924     IP VTE HIGH RISK PATIENT  Once         06/13/23 1924                   On 06/14/2023, patient should be placed in hospital observation services under my care.        Jorge Buckley DO  Department of Hospital Medicine  [facility]

## 2023-06-14 NOTE — ASSESSMENT & PLAN NOTE
Call light within reach  Patient educated on calling for help before getting out of bed  BSC in place  Non slip footwear  Bed low and locked

## 2023-06-14 NOTE — SUBJECTIVE & OBJECTIVE
Past Medical History:   Diagnosis Date    Carotid artery stenosis     Chronic respiratory failure with hypoxia     COPD (chronic obstructive pulmonary disease)     Diabetes mellitus     DVT (deep venous thrombosis)     Heart failure     High cholesterol     Hypertension     Hypothyroidism     Paroxysmal atrial fibrillation        Past Surgical History:   Procedure Laterality Date    APPENDECTOMY      BACK SURGERY  1980 1984    BILATERAL TUBAL LIGATION  1994    CARDIAC SURGERY  1990    aorta bypass    caritod Left     HERNIA REPAIR      intestine repair         Review of patient's allergies indicates:   Allergen Reactions    Tylenol [acetaminophen] Anaphylaxis and Shortness Of Breath       No current facility-administered medications on file prior to encounter.     Current Outpatient Medications on File Prior to Encounter   Medication Sig    amiodarone (PACERONE) 200 MG Tab Take 200 mg by mouth once daily. Take 1 tablet by mouth in the morning and 1 tablet before bedtime.    atorvastatin (LIPITOR) 40 MG tablet Take 40 mg by mouth.    digoxin (LANOXIN) 125 mcg tablet Take 125 mcg by mouth once daily.    fluticasone/umeclidin/vilanter (TRELEGY ELLIPTA INHL) Inhale into the lungs daily as needed.    furosemide (LASIX) 40 MG tablet Take 40 mg by mouth once daily.    levothyroxine (SYNTHROID) 50 MCG tablet Take 1 tablet (50 mcg total) by mouth before breakfast.    metFORMIN (GLUCOPHAGE) 1000 MG tablet Take 1,000 mg by mouth 2 (two) times daily with meals.    predniSONE (DELTASONE) 5 MG tablet Take 5 mg by mouth.    warfarin (COUMADIN) 4 MG tablet Take 4 mg by mouth Daily.     Family History    None       Tobacco Use    Smoking status: Never    Smokeless tobacco: Never   Substance and Sexual Activity    Alcohol use: Not on file    Drug use: Not on file    Sexual activity: Not on file     Review of Systems   Constitutional:  Negative for appetite change, chills and fever.   Respiratory:  Negative for cough, shortness of  breath and wheezing.    Cardiovascular:  Negative for chest pain, palpitations and leg swelling.   Gastrointestinal:  Positive for nausea and vomiting. Negative for abdominal distention and diarrhea.   Genitourinary:  Negative for dysuria.   Skin:  Negative for rash.   Neurological:  Negative for dizziness, seizures and syncope.   Psychiatric/Behavioral:  Negative for agitation, behavioral problems and confusion.    All other systems reviewed and are negative.  Objective:     Vital Signs (Most Recent):  Temp: 98 °F (36.7 °C) (06/14/23 0816)  Pulse: 60 (06/14/23 0816)  Resp: 18 (06/14/23 0847)  BP: (!) 149/81 (06/14/23 0816)  SpO2: 98 % (06/14/23 0816) Vital Signs (24h Range):  Temp:  [97.7 °F (36.5 °C)-98.6 °F (37 °C)] 98 °F (36.7 °C)  Pulse:  [55-85] 60  Resp:  [18-20] 18  SpO2:  [96 %-99 %] 98 %  BP: (104-154)/(50-81) 149/81     Weight: 67.6 kg (149 lb 0.5 oz)  Body mass index is 23.34 kg/m².     Physical Exam  Vitals reviewed.   Constitutional:       General: She is not in acute distress.     Appearance: Normal appearance.   HENT:      Head: Normocephalic and atraumatic.   Eyes:      General: No scleral icterus.     Extraocular Movements: Extraocular movements intact.      Conjunctiva/sclera: Conjunctivae normal.      Pupils: Pupils are equal, round, and reactive to light.   Cardiovascular:      Rate and Rhythm: Normal rate and regular rhythm.      Heart sounds: No murmur heard.    No friction rub. No gallop.   Pulmonary:      Effort: Pulmonary effort is normal. No respiratory distress.      Breath sounds: Normal breath sounds. No wheezing or rales.   Abdominal:      General: Abdomen is flat. Bowel sounds are normal. There is no distension.      Palpations: Abdomen is soft.      Tenderness: There is no abdominal tenderness. There is no guarding.   Musculoskeletal:         General: No swelling.   Skin:     General: Skin is warm and dry.      Coloration: Skin is not jaundiced.      Findings: No rash.    Neurological:      General: No focal deficit present.      Mental Status: She is alert and oriented to person, place, and time.      Sensory: No sensory deficit.      Motor: Weakness present.   Psychiatric:         Mood and Affect: Mood normal.         Thought Content: Thought content normal.         Judgment: Judgment normal.            CRANIAL NERVES     CN III, IV, VI   Pupils are equal, round, and reactive to light.     Significant Labs: All pertinent labs within the past 24 hours have been reviewed.  Recent Lab Results  (Last 5 results in the past 24 hours)        06/14/23  1053   06/14/23  1014   06/14/23  0548   06/14/23  0544   06/14/23  0511        Albumin/Globulin Ratio       0.7         Albumin       2.8         Alkaline Phosphatase       80         ALT       25         Anion Gap       6         AST       22         Baso #     0.03           Basophil %     0.3           BILIRUBIN TOTAL       0.6         BUN       19         BUN/CREAT RATIO       19         Calcium       8.8         Chloride       98         CO2       35         Creatinine       0.98         Differential Type     Auto           eGFR       58         Eos #     0.08           Eosinophil %     0.9           Estimated Avg Glucose     160           Globulin, Total       3.8         Glucose       145         Hematocrit     35.1           Hemoglobin     10.4           Hemoglobin A1C External     7.4  Comment:   Normal:               <5.7%  Pre-Diabetic:       5.7% to 6.4%  Diabetic:             >6.4%  Diabetic Goal:     <7%           INR   2.81             Lymph #     1.19           Lymph %     13.0           MCH     25.1           MCHC     29.6           MCV     84.8           Mono #     0.76           Mono %     8.3           MPV     9.8           Neutrophils, Abs     7.07           Neutrophils Relative     77.5           Platelets     337           POC Glucose 209         146       Potassium       3.3         PROTEIN TOTAL       6.6          Protime   29.4             RBC     4.14           RDW     16.4           Sodium       136         WBC     9.13                                  Significant Imaging: I have reviewed all pertinent imaging results/findings within the past 24 hours.

## 2023-06-14 NOTE — HPI
84 yo WF admitted from ED for intractable nausea/vomiting, UTI and elevated BNP. Vomiting since Sunday, unable to tolerate PO intake. Not improved with fluids, Zofran, Phenergan in ED.    History:  COPD  Diabetes  DVT  Carotid artery stenosis  CHF  HTN  HLD  Hypothyroid  Afib    FULL CODE

## 2023-06-14 NOTE — PLAN OF CARE
Problem: Adult Inpatient Plan of Care  Goal: Plan of Care Review  Outcome: Ongoing, Progressing  Goal: Patient-Specific Goal (Individualized)  Outcome: Ongoing, Progressing  Goal: Absence of Hospital-Acquired Illness or Injury  Outcome: Ongoing, Progressing  Goal: Optimal Comfort and Wellbeing  Outcome: Ongoing, Progressing  Goal: Readiness for Transition of Care  Outcome: Ongoing, Progressing     Problem: Diabetes Comorbidity  Goal: Blood Glucose Level Within Targeted Range  Outcome: Ongoing, Progressing     Problem: Adult Inpatient Plan of Care  Goal: Plan of Care Review  6/13/2023 2039 by Teofilo Lizarraga LPN  Outcome: Ongoing, Progressing  6/13/2023 2038 by Teofilo Lizarraga LPN  Outcome: Ongoing, Progressing  Goal: Patient-Specific Goal (Individualized)  6/13/2023 2039 by Teofilo Lizarraga LPN  Outcome: Ongoing, Progressing  6/13/2023 2038 by Teofilo Lizarraga LPN  Outcome: Ongoing, Progressing  Goal: Absence of Hospital-Acquired Illness or Injury  6/13/2023 2039 by Teofilo Lizarraga LPN  Outcome: Ongoing, Progressing  6/13/2023 2038 by Teofilo Lizarraga LPN  Outcome: Ongoing, Progressing  Goal: Optimal Comfort and Wellbeing  6/13/2023 2039 by Teofilo Lizarraga LPN  Outcome: Ongoing, Progressing  6/13/2023 2038 by Teofilo Lizarraga LPN  Outcome: Ongoing, Progressing  Goal: Readiness for Transition of Care  6/13/2023 2039 by Teofilo Lizarraga LPN  Outcome: Ongoing, Progressing  6/13/2023 2038 by Teofilo Lizarraga LPN  Outcome: Ongoing, Progressing     Problem: Fatigue  Goal: Improved Activity Tolerance  Outcome: Ongoing, Progressing     Problem: Fall Injury Risk  Goal: Absence of Fall and Fall-Related Injury  Outcome: Ongoing, Progressing     Problem: Breathing Pattern Ineffective  Goal: Effective Breathing Pattern  Outcome: Ongoing, Progressing     Problem: Pain Acute  Goal: Acceptable Pain Control and Functional Ability  Outcome: Ongoing, Progressing     Problem: Infection  Goal: Absence of Infection Signs and  Symptoms  Outcome: Ongoing, Progressing     Problem: Gas Exchange Impaired  Goal: Optimal Gas Exchange  Outcome: Ongoing, Progressing

## 2023-06-14 NOTE — NURSING
Nurses Note -- 4 Eyes      6/13/2023   8:38 PM      Skin assessed during: Admit      [] No Altered Skin Integrity Present    []Prevention Measures Documented      [x] Yes- Altered Skin Integrity Present or Discovered   [x] LDA Added if Not in Epic (Describe Wound)   [x] New Altered Skin Integrity was Present on Admit and Documented in LDA   [x] Wound Image Taken    Wound Care Consulted? Yes    Attending Nurse:  Teofilo Lizarraga LPN     Second RN/Staff Member:  Ronna Becker RN

## 2023-06-15 LAB
DIGOXIN SERPL-MCNC: 0.4 NG/ML (ref 0.8–2)
GLUCOSE SERPL-MCNC: 141 MG/DL (ref 70–105)
GLUCOSE SERPL-MCNC: 163 MG/DL (ref 70–105)
GLUCOSE SERPL-MCNC: 201 MG/DL (ref 70–105)
INR BLD: 3.26
PROTHROMBIN TIME: 33 SECONDS (ref 11.7–14.7)

## 2023-06-15 PROCEDURE — 80162 ASSAY OF DIGOXIN TOTAL: CPT | Performed by: HOSPITALIST

## 2023-06-15 PROCEDURE — 25000003 PHARM REV CODE 250: Performed by: NURSE PRACTITIONER

## 2023-06-15 PROCEDURE — 85610 PROTHROMBIN TIME: CPT | Performed by: HOSPITALIST

## 2023-06-15 PROCEDURE — 27000221 HC OXYGEN, UP TO 24 HOURS

## 2023-06-15 PROCEDURE — 99900035 HC TECH TIME PER 15 MIN (STAT)

## 2023-06-15 PROCEDURE — 99232 SBSQ HOSP IP/OBS MODERATE 35: CPT | Mod: ,,, | Performed by: HOSPITALIST

## 2023-06-15 PROCEDURE — 94640 AIRWAY INHALATION TREATMENT: CPT

## 2023-06-15 PROCEDURE — 25000003 PHARM REV CODE 250: Performed by: HOSPITALIST

## 2023-06-15 PROCEDURE — 99232 PR SUBSEQUENT HOSPITAL CARE,LEVL II: ICD-10-PCS | Mod: ,,, | Performed by: HOSPITALIST

## 2023-06-15 PROCEDURE — 63600175 PHARM REV CODE 636 W HCPCS: Performed by: HOSPITALIST

## 2023-06-15 PROCEDURE — 63600175 PHARM REV CODE 636 W HCPCS: Performed by: NURSE PRACTITIONER

## 2023-06-15 PROCEDURE — 82962 GLUCOSE BLOOD TEST: CPT | Mod: 91

## 2023-06-15 PROCEDURE — 11000001 HC ACUTE MED/SURG PRIVATE ROOM

## 2023-06-15 RX ORDER — ADHESIVE BANDAGE
30 BANDAGE TOPICAL EVERY 8 HOURS PRN
Status: DISCONTINUED | OUTPATIENT
Start: 2023-06-15 | End: 2023-06-16 | Stop reason: HOSPADM

## 2023-06-15 RX ORDER — WARFARIN 1 MG/1
4 TABLET ORAL
Status: DISCONTINUED | OUTPATIENT
Start: 2023-06-16 | End: 2023-06-16 | Stop reason: HOSPADM

## 2023-06-15 RX ORDER — WARFARIN 1 MG/1
3 TABLET ORAL
Status: DISCONTINUED | OUTPATIENT
Start: 2023-06-15 | End: 2023-06-16 | Stop reason: HOSPADM

## 2023-06-15 RX ADMIN — CEFTRIAXONE SODIUM 1 G: 1 INJECTION, POWDER, FOR SOLUTION INTRAMUSCULAR; INTRAVENOUS at 01:06

## 2023-06-15 RX ADMIN — AMIODARONE HYDROCHLORIDE 200 MG: 200 TABLET ORAL at 07:06

## 2023-06-15 RX ADMIN — ALBUTEROL SULFATE 2 PUFF: 90 AEROSOL, METERED RESPIRATORY (INHALATION) at 11:06

## 2023-06-15 RX ADMIN — DIGOXIN 125 MCG: 125 TABLET ORAL at 07:06

## 2023-06-15 RX ADMIN — AMIODARONE HYDROCHLORIDE 200 MG: 200 TABLET ORAL at 08:06

## 2023-06-15 RX ADMIN — ALBUTEROL SULFATE 2 PUFF: 90 AEROSOL, METERED RESPIRATORY (INHALATION) at 08:06

## 2023-06-15 RX ADMIN — ALBUTEROL SULFATE 2 PUFF: 90 AEROSOL, METERED RESPIRATORY (INHALATION) at 04:06

## 2023-06-15 RX ADMIN — MELATONIN 6 MG: at 08:06

## 2023-06-15 RX ADMIN — FAMOTIDINE 20 MG: 10 INJECTION, SOLUTION INTRAVENOUS at 07:06

## 2023-06-15 RX ADMIN — INSULIN ASPART 2 UNITS: 100 INJECTION, SOLUTION INTRAVENOUS; SUBCUTANEOUS at 11:06

## 2023-06-15 RX ADMIN — ATORVASTATIN CALCIUM 40 MG: 40 TABLET, FILM COATED ORAL at 07:06

## 2023-06-15 RX ADMIN — OXYCODONE HYDROCHLORIDE 10 MG: 5 TABLET ORAL at 07:06

## 2023-06-15 RX ADMIN — LEVOTHYROXINE SODIUM 50 MCG: 0.05 TABLET ORAL at 05:06

## 2023-06-15 RX ADMIN — OXYCODONE HYDROCHLORIDE 10 MG: 5 TABLET ORAL at 10:06

## 2023-06-15 RX ADMIN — PREDNISONE 5 MG: 5 TABLET ORAL at 07:06

## 2023-06-15 RX ADMIN — OXYCODONE HYDROCHLORIDE 10 MG: 5 TABLET ORAL at 01:06

## 2023-06-15 RX ADMIN — WARFARIN SODIUM 3 MG: 1 TABLET ORAL at 04:06

## 2023-06-15 NOTE — ASSESSMENT & PLAN NOTE
IV fluids  Clear liquid diet, advance as tolerated  Zofran/Phenergan PRN    A little better today. Adv to regular today.

## 2023-06-15 NOTE — SUBJECTIVE & OBJECTIVE
Interval History: eating some better, will adv diet tonight to reg.      Review of Systems   Respiratory:  Negative for shortness of breath.    Cardiovascular:  Negative for chest pain.   Gastrointestinal:  Positive for constipation. Negative for abdominal pain, nausea and vomiting.   Psychiatric/Behavioral:  Negative for agitation and confusion.    All other systems reviewed and are negative.  Objective:     Vital Signs (Most Recent):  Temp: 97.4 °F (36.3 °C) (06/15/23 0813)  Pulse: (!) 58 (06/15/23 0813)  Resp: 19 (06/15/23 0813)  BP: (!) 134/56 (06/15/23 0813)  SpO2: 99 % (06/15/23 0813) Vital Signs (24h Range):  Temp:  [97.4 °F (36.3 °C)-98.7 °F (37.1 °C)] 97.4 °F (36.3 °C)  Pulse:  [51-62] 58  Resp:  [17-20] 19  SpO2:  [94 %-99 %] 99 %  BP: ()/(40-65) 134/56     Weight: 67.8 kg (149 lb 7.6 oz)  Body mass index is 23.41 kg/m².    Intake/Output Summary (Last 24 hours) at 6/15/2023 1205  Last data filed at 6/15/2023 0758  Gross per 24 hour   Intake 960 ml   Output --   Net 960 ml         Physical Exam  Vitals reviewed.   Constitutional:       General: She is not in acute distress.     Appearance: Normal appearance.   HENT:      Head: Normocephalic and atraumatic.   Eyes:      General: No scleral icterus.     Extraocular Movements: Extraocular movements intact.      Conjunctiva/sclera: Conjunctivae normal.      Pupils: Pupils are equal, round, and reactive to light.   Cardiovascular:      Rate and Rhythm: Normal rate and regular rhythm.      Heart sounds: No murmur heard.    No friction rub. No gallop.   Pulmonary:      Effort: Pulmonary effort is normal. No respiratory distress.      Breath sounds: Normal breath sounds. No wheezing or rales.   Abdominal:      General: Abdomen is flat. Bowel sounds are normal. There is no distension.      Palpations: Abdomen is soft.      Tenderness: There is no abdominal tenderness. There is no guarding.   Musculoskeletal:         General: No swelling.   Skin:     General:  Skin is warm and dry.      Coloration: Skin is not jaundiced.      Findings: No rash.   Neurological:      General: No focal deficit present.      Mental Status: She is alert and oriented to person, place, and time.      Sensory: No sensory deficit.      Motor: No weakness.   Psychiatric:         Mood and Affect: Mood normal.         Thought Content: Thought content normal.         Judgment: Judgment normal.           Significant Labs: All pertinent labs within the past 24 hours have been reviewed.  Recent Lab Results  (Last 5 results in the past 24 hours)        06/15/23  1132   06/15/23  0915   06/15/23  0524   06/15/23  0508   06/14/23  1710        Digoxin Lvl   0.4             INR       3.26         POC Glucose 201     141     209       Protime       33.0                                Significant Imaging: I have reviewed all pertinent imaging results/findings within the past 24 hours.

## 2023-06-15 NOTE — PROGRESS NOTES
[facility] Medicine  Progress Note    Patient Name: Karina Rojas  MRN: [id number]  Patient Class: IP- Inpatient   Admission Date: 6/13/2023  Length of Stay: 1 days  Attending Physician: Jorge Buckley DO  Primary Care Provider: Thalia Bowie NP        Subjective:     Principal Problem:Intractable nausea and vomiting        HPI:  84 yo WF admitted from ED for intractable nausea/vomiting, UTI and elevated BNP. Vomiting since Sunday, unable to tolerate PO intake. Not improved with fluids, Zofran, Phenergan in ED.    History:  COPD  Diabetes  DVT  Carotid artery stenosis  CHF  HTN  HLD  Hypothyroid  Afib    FULL CODE        Overview/Hospital Course:  6/15 Doing better today, tolerating fulls.  Will adv to regular tonight.  ABD Xray showed no obstruction or SBO.  Does have increased stool.  Will try some MoM.        Interval History: eating some better, will adv diet tonight to reg.      Review of Systems   Respiratory:  Negative for shortness of breath.    Cardiovascular:  Negative for chest pain.   Gastrointestinal:  Positive for constipation. Negative for abdominal pain, nausea and vomiting.   Psychiatric/Behavioral:  Negative for agitation and confusion.    All other systems reviewed and are negative.  Objective:     Vital Signs (Most Recent):  Temp: 97.4 °F (36.3 °C) (06/15/23 0813)  Pulse: (!) 58 (06/15/23 0813)  Resp: 19 (06/15/23 0813)  BP: (!) 134/56 (06/15/23 0813)  SpO2: 99 % (06/15/23 0813) Vital Signs (24h Range):  Temp:  [97.4 °F (36.3 °C)-98.7 °F (37.1 °C)] 97.4 °F (36.3 °C)  Pulse:  [51-62] 58  Resp:  [17-20] 19  SpO2:  [94 %-99 %] 99 %  BP: ()/(40-65) 134/56     Weight: 67.8 kg (149 lb 7.6 oz)  Body mass index is 23.41 kg/m².    Intake/Output Summary (Last 24 hours) at 6/15/2023 1205  Last data filed at 6/15/2023 0758  Gross per 24 hour   Intake 960 ml   Output --   Net 960 ml         Physical Exam  Vitals reviewed.   Constitutional:        General: She is not in acute distress.     Appearance: Normal appearance.   HENT:      Head: Normocephalic and atraumatic.   Eyes:      General: No scleral icterus.     Extraocular Movements: Extraocular movements intact.      Conjunctiva/sclera: Conjunctivae normal.      Pupils: Pupils are equal, round, and reactive to light.   Cardiovascular:      Rate and Rhythm: Normal rate and regular rhythm.      Heart sounds: No murmur heard.    No friction rub. No gallop.   Pulmonary:      Effort: Pulmonary effort is normal. No respiratory distress.      Breath sounds: Normal breath sounds. No wheezing or rales.   Abdominal:      General: Abdomen is flat. Bowel sounds are normal. There is no distension.      Palpations: Abdomen is soft.      Tenderness: There is no abdominal tenderness. There is no guarding.   Musculoskeletal:         General: No swelling.   Skin:     General: Skin is warm and dry.      Coloration: Skin is not jaundiced.      Findings: No rash.   Neurological:      General: No focal deficit present.      Mental Status: She is alert and oriented to person, place, and time.      Sensory: No sensory deficit.      Motor: No weakness.   Psychiatric:         Mood and Affect: Mood normal.         Thought Content: Thought content normal.         Judgment: Judgment normal.           Significant Labs: All pertinent labs within the past 24 hours have been reviewed.  Recent Lab Results  (Last 5 results in the past 24 hours)        06/15/23  1132   06/15/23  0915   06/15/23  0524   06/15/23  0508   06/14/23  1710        Digoxin Lvl   0.4             INR       3.26         POC Glucose 201     141     209       Protime       33.0                                Significant Imaging: I have reviewed all pertinent imaging results/findings within the past 24 hours.      Assessment/Plan:      * Intractable nausea and vomiting  IV fluids  Clear liquid diet, advance as tolerated  Zofran/Phenergan PRN    A little better today. Adv  to regular today.     At risk for falls    Call light within reach  Patient educated on calling for help before getting out of bed  BSC in place  Non slip footwear  Bed low and locked    Elevated brain natriuretic peptide (BNP) level  IV Lasix, adjust dose after AM lab  Monitor I/O      Acute cystitis without hematuria  Macrobid PO if tolerated, can switch to IV Rocephin Q24 if unable to tolerate PO      Diabetes  Patient's FSGs are controlled on current medication regimen.  Last A1c reviewed-   Lab Results   Component Value Date    HGBA1C 6.7 (H) 02/06/2023     Most recent fingerstick glucose reviewed- No results for input(s): POCTGLUCOSE in the last 24 hours.  Current correctional scale  Low  Maintain anti-hyperglycemic dose as follows-   Antihyperglycemics (From admission, onward)    Start     Stop Route Frequency Ordered    06/13/23 2019  insulin aspart U-100 injection 0-5 Units         -- SubQ Every 6 hours PRN 06/13/23 1920        Hold Oral hypoglycemics while patient is in the hospital.    HTN (hypertension)  Continue home medications        VTE Risk Mitigation (From admission, onward)         Ordered     warfarin (COUMADIN) tablet 4 mg  Every Mon, Wed, Fri         06/15/23 0854     warfarin (COUMADIN) tablet 3 mg  Every Tues, Thurs, Sat, Sun         06/15/23 0854     Reason for No Pharmacological VTE Prophylaxis  Once        Question:  Reasons:  Answer:  Already adequately anticoagulated on oral Anticoagulants    06/13/23 1924     Reason for no Mechanical VTE Prophylaxis  Once        Question:  Reasons:  Answer:  Patient is Ambulatory    06/13/23 1924     IP VTE HIGH RISK PATIENT  Once         06/13/23 1924                Discharge Planning   CHANTAL:      Code Status: Full Code   Is the patient medically ready for discharge?:     Reason for patient still in hospital (select all that apply): Patient trending condition                     Jorge Buckley,   Department of Hospital Medicine   [facility]

## 2023-06-15 NOTE — HOSPITAL COURSE
6/15 Doing better today, tolerating fulls.  Will adv to regular tonight.  ABD Xray showed no obstruction or SBO.  Does have increased stool.  Will try some MoM.      6/16 Better today, tolerating reg diet.  DC home and has vascular follow up.  PCP in 1 week.

## 2023-06-15 NOTE — PLAN OF CARE
Problem: Breathing Pattern Ineffective  Goal: Effective Breathing Pattern  Outcome: Ongoing, Progressing     Problem: Impaired Wound Healing  Goal: Optimal Wound Healing  Outcome: Ongoing, Progressing

## 2023-06-16 VITALS
SYSTOLIC BLOOD PRESSURE: 113 MMHG | HEART RATE: 58 BPM | RESPIRATION RATE: 19 BRPM | BODY MASS INDEX: 23.76 KG/M2 | DIASTOLIC BLOOD PRESSURE: 54 MMHG | WEIGHT: 151.38 LBS | TEMPERATURE: 98 F | HEIGHT: 67 IN | OXYGEN SATURATION: 96 %

## 2023-06-16 PROBLEM — R11.2 INTRACTABLE NAUSEA AND VOMITING: Status: RESOLVED | Noted: 2023-06-13 | Resolved: 2023-06-16

## 2023-06-16 PROBLEM — N30.00 ACUTE CYSTITIS WITHOUT HEMATURIA: Status: RESOLVED | Noted: 2023-01-06 | Resolved: 2023-06-16

## 2023-06-16 PROBLEM — R79.89 ELEVATED BRAIN NATRIURETIC PEPTIDE (BNP) LEVEL: Status: RESOLVED | Noted: 2023-06-13 | Resolved: 2023-06-16

## 2023-06-16 PROBLEM — Z91.81 AT RISK FOR FALLS: Status: RESOLVED | Noted: 2023-06-13 | Resolved: 2023-06-16

## 2023-06-16 LAB
GLUCOSE SERPL-MCNC: 136 MG/DL (ref 70–105)
GLUCOSE SERPL-MCNC: 281 MG/DL (ref 70–105)
INR BLD: 3.57
PROTHROMBIN TIME: 35.4 SECONDS (ref 11.7–14.7)

## 2023-06-16 PROCEDURE — 99239 PR HOSPITAL DISCHARGE DAY,>30 MIN: ICD-10-PCS | Mod: ,,, | Performed by: HOSPITALIST

## 2023-06-16 PROCEDURE — 85610 PROTHROMBIN TIME: CPT | Performed by: HOSPITALIST

## 2023-06-16 PROCEDURE — 99239 HOSP IP/OBS DSCHRG MGMT >30: CPT | Mod: ,,, | Performed by: HOSPITALIST

## 2023-06-16 PROCEDURE — 94640 AIRWAY INHALATION TREATMENT: CPT

## 2023-06-16 PROCEDURE — 63600175 PHARM REV CODE 636 W HCPCS: Performed by: NURSE PRACTITIONER

## 2023-06-16 PROCEDURE — 25000003 PHARM REV CODE 250: Performed by: NURSE PRACTITIONER

## 2023-06-16 PROCEDURE — 27000221 HC OXYGEN, UP TO 24 HOURS

## 2023-06-16 PROCEDURE — 82962 GLUCOSE BLOOD TEST: CPT | Mod: 91

## 2023-06-16 PROCEDURE — 99900035 HC TECH TIME PER 15 MIN (STAT)

## 2023-06-16 RX ADMIN — OXYCODONE HYDROCHLORIDE 10 MG: 5 TABLET ORAL at 05:06

## 2023-06-16 RX ADMIN — AMIODARONE HYDROCHLORIDE 200 MG: 200 TABLET ORAL at 09:06

## 2023-06-16 RX ADMIN — INSULIN ASPART 3 UNITS: 100 INJECTION, SOLUTION INTRAVENOUS; SUBCUTANEOUS at 11:06

## 2023-06-16 RX ADMIN — PREDNISONE 5 MG: 5 TABLET ORAL at 11:06

## 2023-06-16 RX ADMIN — ALBUTEROL SULFATE 2 PUFF: 90 AEROSOL, METERED RESPIRATORY (INHALATION) at 07:06

## 2023-06-16 RX ADMIN — FAMOTIDINE 20 MG: 10 INJECTION, SOLUTION INTRAVENOUS at 11:06

## 2023-06-16 RX ADMIN — LEVOTHYROXINE SODIUM 50 MCG: 0.05 TABLET ORAL at 05:06

## 2023-06-16 RX ADMIN — ATORVASTATIN CALCIUM 40 MG: 40 TABLET, FILM COATED ORAL at 09:06

## 2023-06-16 NOTE — PLAN OF CARE
Problem: Adult Inpatient Plan of Care  Goal: Absence of Hospital-Acquired Illness or Injury  Outcome: Ongoing, Progressing  Goal: Readiness for Transition of Care  Outcome: Ongoing, Progressing     Problem: Fatigue  Goal: Improved Activity Tolerance  Outcome: Ongoing, Progressing

## 2023-06-16 NOTE — PLAN OF CARE
Patient being discharged   Problem: Adult Inpatient Plan of Care  Goal: Plan of Care Review  Outcome: Met  Goal: Patient-Specific Goal (Individualized)  Outcome: Met  Goal: Absence of Hospital-Acquired Illness or Injury  Outcome: Met  Goal: Optimal Comfort and Wellbeing  Outcome: Met  Goal: Readiness for Transition of Care  Outcome: Met     Problem: Diabetes Comorbidity  Goal: Blood Glucose Level Within Targeted Range  Outcome: Met     Problem: Fatigue  Goal: Improved Activity Tolerance  Outcome: Met     Problem: Fall Injury Risk  Goal: Absence of Fall and Fall-Related Injury  Outcome: Met     Problem: Breathing Pattern Ineffective  Goal: Effective Breathing Pattern  Outcome: Met     Problem: Pain Acute  Goal: Acceptable Pain Control and Functional Ability  Outcome: Met     Problem: Infection  Goal: Absence of Infection Signs and Symptoms  Outcome: Met     Problem: Gas Exchange Impaired  Goal: Optimal Gas Exchange  Outcome: Met     Problem: Impaired Wound Healing  Goal: Optimal Wound Healing  Outcome: Met     Problem: Skin Injury Risk Increased  Goal: Skin Health and Integrity  Outcome: Met

## 2023-06-16 NOTE — DISCHARGE SUMMARY
[facility]  Hospital Medicine  Discharge Summary      Patient Name: Karina Rojas  MRN: [id number]  [place]: [id number]  Patient Class: IP- Inpatient  Admission Date: 6/13/2023  Hospital Length of Stay: 2 days  Discharge Date and Time:  06/16/2023 12:46 PM  Attending Physician: Jorge Buckley DO   Discharging Provider: Jorge Buckley DO  Primary Care Provider: Thalia Bowie NP    Primary Care Team: Networked reference to record PCT     HPI:   84 yo WF admitted from ED for intractable nausea/vomiting, UTI and elevated BNP. Vomiting since Sunday, unable to tolerate PO intake. Not improved with fluids, Zofran, Phenergan in ED.    History:  COPD  Diabetes  DVT  Carotid artery stenosis  CHF  HTN  HLD  Hypothyroid  Afib    FULL CODE        * No surgery found *      Hospital Course:   6/15 Doing better today, tolerating fulls.  Will adv to regular tonight.  ABD Xray showed no obstruction or SBO.  Does have increased stool.  Will try some MoM.      6/16 Better today, tolerating reg diet.  DC home and has vascular follow up.  PCP in 1 week.        Goals of Care Treatment Preferences:  Code Status: Full Code      Consults:     No new Assessment & Plan notes have been filed under this hospital service since the last note was generated.  Service: Hospital Medicine    Final Active Diagnoses:    Diagnosis Date Noted POA    HTN (hypertension) [I10] 01/04/2023 Yes    Diabetes [E11.9] 01/04/2023 Yes      Problems Resolved During this Admission:    Diagnosis Date Noted Date Resolved POA    PRINCIPAL PROBLEM:  Intractable nausea and vomiting [R11.2] 06/13/2023 06/16/2023 Yes    Elevated brain natriuretic peptide (BNP) level [R79.89] 06/13/2023 06/16/2023 Yes    At risk for falls [Z91.81] 06/13/2023 06/16/2023 Not Applicable    Acute cystitis without hematuria [N30.00] 01/06/2023 06/16/2023 Yes       Discharged Condition: good    Disposition: Home or Self Care    Follow Up:   Follow-up  Information     Thalia Bowie NP Follow up in 1 week(s).    Specialty: Family Medicine  Contact information:  [address]  [facility]  [place] [id number] [phone]                       Patient Instructions:   No discharge procedures on file.    Significant Diagnostic Studies: Labs: BMP: No results for input(s): GLU, NA, K, CL, CO2, BUN, CREATININE, CALCIUM, MG in the last 48 hours.    Pending Diagnostic Studies:     None         Medications:  Reconciled Home Medications:      Medication List      CONTINUE taking these medications    amiodarone 200 MG Tab  Commonly known as: PACERONE  Take 200 mg by mouth once daily. Take 1 tablet by mouth in the morning and 1 tablet before bedtime.     atorvastatin 40 MG tablet  Commonly known as: LIPITOR  Take 40 mg by mouth.     digoxin 125 mcg tablet  Commonly known as: LANOXIN  Take 125 mcg by mouth once daily.     furosemide 40 MG tablet  Commonly known as: LASIX  Take 40 mg by mouth once daily.     levothyroxine 50 MCG tablet  Commonly known as: SYNTHROID  Take 1 tablet (50 mcg total) by mouth before breakfast.     metFORMIN 1000 MG tablet  Commonly known as: GLUCOPHAGE  Take 1,000 mg by mouth 2 (two) times daily with meals.     predniSONE 5 MG tablet  Commonly known as: DELTASONE  Take 5 mg by mouth.     TRELEGY ELLIPTA INHL  Inhale into the lungs daily as needed.     warfarin 4 MG tablet  Commonly known as: COUMADIN  Take 4 mg by mouth Daily.            Indwelling Lines/Drains at time of discharge:   Lines/Drains/Airways     None                 Time spent on the discharge of patient: 33 minutes         Jorge Buckley DO  Department of Hospital Medicine  [facility]

## 2023-06-17 LAB — UA COMPLETE W REFLEX CULTURE PNL UR: ABNORMAL

## 2023-06-19 ENCOUNTER — PATIENT OUTREACH (OUTPATIENT)
Dept: ADMINISTRATIVE | Facility: CLINIC | Age: 83
End: 2023-06-19

## 2023-06-19 NOTE — PROGRESS NOTES
C3 nurse attempted to contact patient for a [facility] post hospital discharge follow-up call. The patient declined call at this time. After a few questions the patient seemed frustrated.  Lost connection during the call.   Redialed with reconnection.   The patient started the call  again but during the call states she does not like answering personal questions on the phone.   Explained the reason for the call but the connection ended.

## 2023-07-06 ENCOUNTER — LAB REQUISITION (OUTPATIENT)
Dept: LAB | Facility: HOSPITAL | Age: 83
End: 2023-07-06
Attending: NURSE PRACTITIONER
Payer: MEDICARE

## 2023-07-06 DIAGNOSIS — I25.10 ATHEROSCLEROTIC HEART DISEASE OF NATIVE CORONARY ARTERY WITHOUT ANGINA PECTORIS: ICD-10-CM

## 2023-07-06 LAB
INR BLD: 6.45
PROTHROMBIN TIME: 55.8 SECONDS (ref 11.7–14.7)

## 2023-07-06 PROCEDURE — 85610 PROTHROMBIN TIME: CPT | Performed by: NURSE PRACTITIONER

## 2023-11-15 ENCOUNTER — LAB REQUISITION (OUTPATIENT)
Dept: LAB | Facility: HOSPITAL | Age: 83
End: 2023-11-15
Attending: NURSE PRACTITIONER
Payer: MEDICARE

## 2023-11-15 DIAGNOSIS — E78.5 HYPERLIPIDEMIA, UNSPECIFIED: ICD-10-CM

## 2023-11-15 DIAGNOSIS — G89.4 CHRONIC PAIN SYNDROME: ICD-10-CM

## 2023-11-15 DIAGNOSIS — Z79.899 OTHER LONG TERM (CURRENT) DRUG THERAPY: ICD-10-CM

## 2023-11-15 LAB
AMPHET UR QL SCN: NEGATIVE
BACTERIA #/AREA URNS HPF: ABNORMAL /HPF
BARBITURATES UR QL SCN: NEGATIVE
BENZODIAZ METAB UR QL SCN: NEGATIVE
BILIRUB UR QL STRIP: NEGATIVE
CLARITY UR: CLEAR
COCAINE UR QL SCN: NEGATIVE
COLOR UR: YELLOW
GLUCOSE UR STRIP-MCNC: NEGATIVE MG/DL
KETONES UR STRIP-SCNC: NEGATIVE MG/DL
LEUKOCYTE ESTERASE UR QL STRIP: ABNORMAL
MDA UR QL SCN: NEGATIVE
METHADONE UR QL SCN: NEGATIVE
METHAMPHET UR QL SCN: NEGATIVE
NITRITE UR QL STRIP: NEGATIVE
OPIATES UR QL SCN: POSITIVE
OXYCODONE UR QL SCN: NEGATIVE
PCP UR QL SCN: NEGATIVE
PH UR STRIP: 5.5 PH UNITS
PROT UR QL STRIP: NEGATIVE
RBC # UR STRIP: ABNORMAL /UL
SP GR UR STRIP: 1.01
THC UR QL SCN: NEGATIVE
TRICYCLICS UR QL SCN: NEGATIVE
UROBILINOGEN UR STRIP-ACNC: 0.2 MG/DL
WBC #/AREA URNS HPF: ABNORMAL /HPF

## 2023-11-15 PROCEDURE — 80305 DRUG TEST PRSMV DIR OPT OBS: CPT

## 2023-11-15 PROCEDURE — 81001 URINALYSIS AUTO W/SCOPE: CPT | Mod: 59

## 2023-11-15 PROCEDURE — 87086 URINE CULTURE/COLONY COUNT: CPT

## 2023-11-15 PROCEDURE — 87077 CULTURE AEROBIC IDENTIFY: CPT

## 2023-11-18 LAB — UA COMPLETE W REFLEX CULTURE PNL UR: ABNORMAL

## 2023-11-22 ENCOUNTER — LAB REQUISITION (OUTPATIENT)
Dept: LAB | Facility: HOSPITAL | Age: 83
End: 2023-11-22
Attending: NURSE PRACTITIONER
Payer: MEDICARE

## 2023-11-22 DIAGNOSIS — E11.51 TYPE 2 DIABETES MELLITUS WITH DIABETIC PERIPHERAL ANGIOPATHY WITHOUT GANGRENE: ICD-10-CM

## 2023-11-22 DIAGNOSIS — Z79.01 LONG TERM (CURRENT) USE OF ANTICOAGULANTS: ICD-10-CM

## 2023-11-22 DIAGNOSIS — I50.9 HEART FAILURE, UNSPECIFIED: ICD-10-CM

## 2023-11-22 LAB
ALBUMIN SERPL BCP-MCNC: 3.6 G/DL (ref 3.5–5)
ALBUMIN/GLOB SERPL: 1 {RATIO}
ALP SERPL-CCNC: 88 U/L (ref 55–142)
ALT SERPL W P-5'-P-CCNC: 33 U/L (ref 13–56)
ANION GAP SERPL CALCULATED.3IONS-SCNC: 15 MMOL/L (ref 7–16)
AST SERPL W P-5'-P-CCNC: 25 U/L (ref 15–37)
BASOPHILS # BLD AUTO: 0.04 K/UL (ref 0–0.2)
BASOPHILS NFR BLD AUTO: 0.4 % (ref 0–1)
BILIRUB SERPL-MCNC: 0.3 MG/DL (ref ?–1.2)
BUN SERPL-MCNC: 23 MG/DL (ref 7–18)
BUN/CREAT SERPL: 19 (ref 6–20)
CALCIUM SERPL-MCNC: 9.3 MG/DL (ref 8.5–10.1)
CHLORIDE SERPL-SCNC: 102 MMOL/L (ref 98–107)
CHOLEST SERPL-MCNC: 183 MG/DL (ref 0–200)
CHOLEST/HDLC SERPL: 3.2 {RATIO}
CO2 SERPL-SCNC: 29 MMOL/L (ref 21–32)
CREAT SERPL-MCNC: 1.24 MG/DL (ref 0.55–1.02)
DIFFERENTIAL METHOD BLD: ABNORMAL
EGFR (NO RACE VARIABLE) (RUSH/TITUS): 44 ML/MIN/1.73M2
EOSINOPHIL # BLD AUTO: 0.07 K/UL (ref 0–0.5)
EOSINOPHIL NFR BLD AUTO: 0.6 % (ref 1–4)
ERYTHROCYTE [DISTWIDTH] IN BLOOD BY AUTOMATED COUNT: 18.3 % (ref 11.5–14.5)
GLOBULIN SER-MCNC: 3.5 G/DL (ref 2–4)
GLUCOSE SERPL-MCNC: 155 MG/DL (ref 74–106)
HCT VFR BLD AUTO: 38.4 % (ref 38–47)
HDLC SERPL-MCNC: 57 MG/DL (ref 40–60)
HGB BLD-MCNC: 11.1 G/DL (ref 12–16)
INR BLD: 3.68
LDLC SERPL CALC-MCNC: 93 MG/DL
LDLC/HDLC SERPL: 1.6 {RATIO}
LYMPHOCYTES # BLD AUTO: 2.37 K/UL (ref 1–4.8)
LYMPHOCYTES NFR BLD AUTO: 21 % (ref 27–41)
MCH RBC QN AUTO: 25.3 PG (ref 27–31)
MCHC RBC AUTO-ENTMCNC: 28.9 G/DL (ref 32–36)
MCV RBC AUTO: 87.7 FL (ref 80–96)
MONOCYTES # BLD AUTO: 0.8 K/UL (ref 0–0.8)
MONOCYTES NFR BLD AUTO: 7.1 % (ref 2–6)
MPC BLD CALC-MCNC: 11 FL (ref 9.4–12.4)
NEUTROPHILS # BLD AUTO: 7.98 K/UL (ref 1.8–7.7)
NEUTROPHILS NFR BLD AUTO: 70.9 % (ref 53–65)
NONHDLC SERPL-MCNC: 126 MG/DL
PLATELET # BLD AUTO: 306 K/UL (ref 150–400)
POTASSIUM SERPL-SCNC: 4.6 MMOL/L (ref 3.5–5.1)
PROT SERPL-MCNC: 7.1 G/DL (ref 6.4–8.2)
PROTHROMBIN TIME: 36.2 SECONDS (ref 11.7–14.7)
RBC # BLD AUTO: 4.38 M/UL (ref 4.2–5.4)
SODIUM SERPL-SCNC: 141 MMOL/L (ref 136–145)
TRIGL SERPL-MCNC: 166 MG/DL (ref 35–150)
VLDLC SERPL-MCNC: 33 MG/DL
WBC # BLD AUTO: 11.26 K/UL (ref 4.5–11)

## 2023-11-22 PROCEDURE — 85025 COMPLETE CBC W/AUTO DIFF WBC: CPT

## 2023-11-22 PROCEDURE — 85610 PROTHROMBIN TIME: CPT

## 2023-11-22 PROCEDURE — 80053 COMPREHEN METABOLIC PANEL: CPT

## 2023-11-22 PROCEDURE — 80061 LIPID PANEL: CPT

## 2023-11-29 ENCOUNTER — LAB REQUISITION (OUTPATIENT)
Dept: LAB | Facility: HOSPITAL | Age: 83
End: 2023-11-29
Attending: NURSE PRACTITIONER
Payer: MEDICARE

## 2023-11-29 DIAGNOSIS — N39.0 URINARY TRACT INFECTION, SITE NOT SPECIFIED: ICD-10-CM

## 2023-11-29 LAB
BACTERIA #/AREA URNS HPF: ABNORMAL /HPF
BILIRUB UR QL STRIP: ABNORMAL
CLARITY UR: CLEAR
COLOR UR: YELLOW
GLUCOSE UR STRIP-MCNC: NEGATIVE MG/DL
HYALINE CASTS #/AREA URNS LPF: ABNORMAL /LPF
KETONES UR STRIP-SCNC: 15 MG/DL
LEUKOCYTE ESTERASE UR QL STRIP: ABNORMAL
NITRITE UR QL STRIP: NEGATIVE
PH UR STRIP: 5.5 PH UNITS
PROT UR QL STRIP: 100
RBC # UR STRIP: NEGATIVE /UL
SP GR UR STRIP: 1.02
SQUAMOUS #/AREA URNS LPF: ABNORMAL /LPF
UROBILINOGEN UR STRIP-ACNC: 1 MG/DL
WBC #/AREA URNS HPF: ABNORMAL /HPF

## 2023-11-29 PROCEDURE — 87086 URINE CULTURE/COLONY COUNT: CPT

## 2023-11-29 PROCEDURE — 81001 URINALYSIS AUTO W/SCOPE: CPT

## 2023-12-02 LAB — UA COMPLETE W REFLEX CULTURE PNL UR: NORMAL

## 2024-02-14 ENCOUNTER — LAB REQUISITION (OUTPATIENT)
Dept: LAB | Facility: HOSPITAL | Age: 84
End: 2024-02-14
Attending: NURSE PRACTITIONER
Payer: MEDICARE

## 2024-02-14 DIAGNOSIS — G89.4 CHRONIC PAIN SYNDROME: ICD-10-CM

## 2024-02-14 DIAGNOSIS — R32 UNSPECIFIED URINARY INCONTINENCE: ICD-10-CM

## 2024-02-14 DIAGNOSIS — J44.9 CHRONIC OBSTRUCTIVE PULMONARY DISEASE, UNSPECIFIED: ICD-10-CM

## 2024-02-14 LAB
AMPHET UR QL SCN: NEGATIVE
BARBITURATES UR QL SCN: NEGATIVE
BENZODIAZ METAB UR QL SCN: NEGATIVE
COCAINE UR QL SCN: NEGATIVE
MDA UR QL SCN: NEGATIVE
METHADONE UR QL SCN: NEGATIVE
METHAMPHET UR QL SCN: NEGATIVE
OPIATES UR QL SCN: NEGATIVE
OXYCODONE UR QL SCN: POSITIVE
PCP UR QL SCN: NEGATIVE
THC UR QL SCN: NEGATIVE
TRICYCLICS UR QL SCN: NEGATIVE

## 2024-02-14 PROCEDURE — 80305 DRUG TEST PRSMV DIR OPT OBS: CPT

## 2025-03-26 NOTE — NURSING
Report given to oncoming shift.   Verbal report give to Estrella nurse with [facility]     PT discharged in stable condition     Delay in discharge due iv admin of potassium and mag